# Patient Record
Sex: MALE | Race: WHITE | Employment: OTHER | URBAN - METROPOLITAN AREA
[De-identification: names, ages, dates, MRNs, and addresses within clinical notes are randomized per-mention and may not be internally consistent; named-entity substitution may affect disease eponyms.]

---

## 2017-05-10 ENCOUNTER — OFFICE VISIT (OUTPATIENT)
Dept: FAMILY MEDICINE | Facility: CLINIC | Age: 42
End: 2017-05-10
Payer: COMMERCIAL

## 2017-05-10 VITALS
HEIGHT: 72 IN | OXYGEN SATURATION: 97 % | HEART RATE: 72 BPM | RESPIRATION RATE: 16 BRPM | BODY MASS INDEX: 25.73 KG/M2 | SYSTOLIC BLOOD PRESSURE: 132 MMHG | DIASTOLIC BLOOD PRESSURE: 91 MMHG | TEMPERATURE: 97.8 F | WEIGHT: 190 LBS

## 2017-05-10 DIAGNOSIS — Z71.84 COUNSELING ABOUT TRAVEL: Primary | ICD-10-CM

## 2017-05-10 PROCEDURE — 99401 PREV MED CNSL INDIV APPRX 15: CPT | Performed by: FAMILY MEDICINE

## 2017-05-10 RX ORDER — CIPROFLOXACIN 500 MG/1
500 TABLET, FILM COATED ORAL 2 TIMES DAILY
Qty: 6 TABLET | Refills: 0 | Status: SHIPPED | OUTPATIENT
Start: 2017-05-10 | End: 2017-12-11

## 2017-05-10 NOTE — MR AVS SNAPSHOT
"              After Visit Summary   5/10/2017    Romario Lizarraga    MRN: [id number]           Patient Information     Date Of Birth          1975        Visit Information        Provider Department      5/10/2017 8:30 AM [address] MD [facility]        Today's Diagnoses     Counseling about travel    -  1       Follow-ups after your visit        Who to contact     If you have questions or need follow up information about today's clinic visit or your schedule please contact [facility] directly at [phone].  Normal or non-critical lab and imaging results will be communicated to you by MyChart, letter or phone within 4 business days after the clinic has received the results. If you do not hear from us within 7 days, please contact the clinic through [employer] or phone. If you have a critical or abnormal lab result, we will notify you by phone as soon as possible.  Submit refill requests through [employer] or call your pharmacy and they will forward the refill request to us. Please allow 3 business days for your refill to be completed.          Additional Information About Your Visit        MyChart Information     [employer] lets you send messages to your doctor, view your test results, renew your prescriptions, schedule appointments and more. To sign up, go to [email or web address] . Click on \"Log in\" on the left side of the screen, which will take you to the Welcome page. Then click on \"Sign up Now\" on the right side of the page.     You will be asked to enter the access code listed below, as well as some personal information. Please follow the directions to create your username and password.     Your access code is: [email or web address]  Expires: 2017  9:30 AM     Your access code will  in 90 days. If you need help or a new code, please call your [facility] or [phone].        Care EveryWhere ID     This is your Care EveryWhere ID. This could be used by other " organizations to access your [place] medical records  [id number]        Your Vitals Were     Pulse Temperature Respirations Height Pulse Oximetry BMI (Body Mass Index)    72 97.8  F (36.6  C) (Oral) 16 6' (1.829 m) 97% 25.77 kg/m2       Blood Pressure from Last 3 Encounters:   05/10/17 (!) 132/91   12/05/16 135/80   11/30/16 155/83    Weight from Last 3 Encounters:   05/10/17 190 lb (86.2 kg)   11/30/16 204 lb (92.5 kg)   11/15/16 197 lb (89.4 kg)              Today, you had the following     No orders found for display         Today's Medication Changes          These changes are accurate as of: 5/10/17  9:32 AM.  If you have any questions, ask your nurse or doctor.               Start taking these medicines.        Dose/Directions    ciprofloxacin 500 MG tablet   Commonly known as:  CIPRO   Used for:  Counseling about travel   Started by:  Lion [address] MD        Dose:  500 mg   Take 1 tablet (500 mg) by mouth 2 times daily   Quantity:  6 tablet   Refills:  0            Where to get your medicines      These medications were sent to [place] [address] [place] [facility] [address] [facility]  [address] [facility] [address]     Phone:  [phone]     ciprofloxacin 500 MG tablet                Primary Care Provider Office Phone # Fax #    Daniel Bauer [phone]       [address] [place] [address]        Thank you!     Thank you for choosing [facility]  for your care. Our goal is always to provide you with excellent care. Hearing back from our patients is one way we can continue to improve our services. Please take a few minutes to complete the written survey that you may receive in the mail after your visit with us. Thank you!             Your Updated Medication List - Protect others around you: Learn how to safely use, store and throw away your medicines at www.disposemymeds.org.          This list is accurate  as of: 5/10/17  9:32 AM.  Always use your most recent med list.                   Brand Name Dispense Instructions for use    albuterol 108 (90 BASE) MCG/ACT Inhaler    PROAIR HFA/PROVENTIL HFA/VENTOLIN HFA    3 Inhaler    Inhale 2 puffs into the lungs every 4 hours as needed for shortness of breath / dyspnea       ciprofloxacin 500 MG tablet    CIPRO    6 tablet    Take 1 tablet (500 mg) by mouth 2 times daily       fluticasone-salmeterol 250-50 MCG/DOSE diskus inhaler    ADVAIR DISKUS    3 Inhaler    Inhale 1 puff into the lungs every 12 hours

## 2017-05-10 NOTE — PROGRESS NOTES
Itinerary:  [facility]    Departure Date: 6/13/17    IMMUNIZATION HISTORY  Have you ever fainted from having your blood drawn or from an injection?  No  Have you ever had a fever reaction to vaccination?  No  Have you ever had any bad reaction or side effect from any vaccination?  No  Have you ever had hepatitis A or B vaccine?  Yes  Do you live (or work closely) with anyone who has AIDS, an AIDS-like condition, any other immune disorder or who is on chemotherapy for cancer?  No  Do you have a family history of immunodeficiency?  No  Have you received any injection of immune globulin or any blood products during the past 12 months?  No    GENERAL MEDICAL HISTORY  Do you have a medical condition that warrants maintenance medication or physician follow-up?  Yes  Do you have a medical condition that is stable now, but that may recur while traveling?  Yes  Have you had a fever in the past 48 hours?  No  Are you pregnant, or might you become pregnant on this trip?  No  Do you have AIDS, an AIDS-like condition, any other immune disorder, leukemia or cancer?  No  Have you had your thymus gland removed or a history of problems with your thymus, such as myasthenia gravis, DiGeorge syndrome, or thymoma?  No  Do you have severe thrombocytopenia (low platelet count) or a coagulation disorder?  No  Have you ever had a convulsion, seizure, epilepsy, neurologic condition or brain infection?  No  Do you have any stomach conditions?  No  Do you have a G6PD deficiency?  No  Do you have severe renal impairment?  No  Do you have bowel conditions such as diarrhea or constipation?  No  Have you ever had hepatitis or yellow jaundice?  No  Do you have a history of psychiatric problems?  No  Do you have a problem with strange dreams and/or nightmares?  No  Do you have insomnia?  No  Do you have problems with vaginitis?  No  Do you have psoriasis?  No  Have you or a member of your household ever been diagnosed with eczema or atopic  dermatitis (e.g. Itchy, red, scaly rash lasting >2 weeks that often comes and goes)?  No  Cardiac disease, with or without symptoms?  No  Do you have any eye conditions?  No  Are you prone to motion sickness?  No        Subjective:  Patient here for immunizations prior to traveling to [facility].  Patient denies any pain. symptoms of fever, cough or URI.  Objective:  Travel itinerary and immunization history completed.  Assessment:  International travel medical questionnaire completed.  Ok to give vaccines.  Plan:  Cipro 500 is given for travelers diarrhea per protocol.  Patient education reviewed and given to Romario.  Post Travel Period sheet discussed.  Romario advised to stay after injection per protocol.  the visit lasted for 15 minute, more than 50% was spent in counseling and coordination of care of the above issues.

## 2017-05-10 NOTE — NURSING NOTE
Chief Complaint   Patient presents with     Travel Clinic     [place]       Initial BP (!) 132/91 (BP Location: Right arm, Patient Position: Chair, Cuff Size: Adult Regular)  Pulse 72  Temp 97.8  F (36.6  C) (Oral)  Resp 16  Ht 6' (1.829 m)  Wt 190 lb (86.2 kg)  SpO2 97%  BMI 25.77 kg/m2 Estimated body mass index is 25.77 kg/(m^2) as calculated from the following:    Height as of this encounter: 6' (1.829 m).    Weight as of this encounter: 190 lb (86.2 kg).  Medication Reconciliation: complete   Kathrin Davalos, CMA

## 2017-11-22 DIAGNOSIS — J45.30 MILD PERSISTENT ASTHMA WITHOUT COMPLICATION: ICD-10-CM

## 2017-11-24 NOTE — TELEPHONE ENCOUNTER
Requested Prescriptions   Signed Prescriptions Disp Refills     fluticasone-salmeterol (ADVAIR) 250-50 MCG/DOSE diskus inhaler 1 Inhaler 0     Sig: Inhale 1 puff into the lungs every 12 hours    Inhaled Steroids Protocol Failed    11/22/2017  8:09 AM       Failed - Asthma control test 20 or greater in last 6 months    Please review ACT score.          Failed - Recent (6 mo) or future visit with authorizing provider's specialty    Patient had office visit in the last 6 months or has a visit in the next 30 days with authorizing provider.  See chart review.            Passed - Patient is age 12 or older        Refilled x 1. Due for 6 month asthma visit. Please call patient to help schedule this.   Chely Patrick, RN  Triage Nurse

## 2017-12-11 ENCOUNTER — OFFICE VISIT (OUTPATIENT)
Dept: PEDIATRICS | Facility: CLINIC | Age: 42
End: 2017-12-11
Payer: COMMERCIAL

## 2017-12-11 VITALS
WEIGHT: 207 LBS | BODY MASS INDEX: 28.07 KG/M2 | TEMPERATURE: 98 F | DIASTOLIC BLOOD PRESSURE: 80 MMHG | OXYGEN SATURATION: 99 % | SYSTOLIC BLOOD PRESSURE: 124 MMHG | HEART RATE: 87 BPM

## 2017-12-11 DIAGNOSIS — G89.29 CHRONIC GROIN PAIN, LEFT: ICD-10-CM

## 2017-12-11 DIAGNOSIS — M67.40 GANGLION CYST: ICD-10-CM

## 2017-12-11 DIAGNOSIS — N52.9 ERECTILE DYSFUNCTION, UNSPECIFIED ERECTILE DYSFUNCTION TYPE: ICD-10-CM

## 2017-12-11 DIAGNOSIS — G89.29 CHRONIC LOW BACK PAIN WITHOUT SCIATICA, UNSPECIFIED BACK PAIN LATERALITY: ICD-10-CM

## 2017-12-11 DIAGNOSIS — J45.30 MILD PERSISTENT ASTHMA WITHOUT COMPLICATION: Primary | ICD-10-CM

## 2017-12-11 DIAGNOSIS — M54.50 CHRONIC LOW BACK PAIN WITHOUT SCIATICA, UNSPECIFIED BACK PAIN LATERALITY: ICD-10-CM

## 2017-12-11 DIAGNOSIS — R10.32 CHRONIC GROIN PAIN, LEFT: ICD-10-CM

## 2017-12-11 PROCEDURE — 99214 OFFICE O/P EST MOD 30 MIN: CPT | Performed by: INTERNAL MEDICINE

## 2017-12-11 RX ORDER — SILDENAFIL 100 MG/1
100 TABLET, FILM COATED ORAL DAILY PRN
Qty: 12 TABLET | Refills: 3 | Status: SHIPPED | OUTPATIENT
Start: 2017-12-11 | End: 2018-12-10

## 2017-12-11 RX ORDER — ALBUTEROL SULFATE 90 UG/1
2 AEROSOL, METERED RESPIRATORY (INHALATION) EVERY 4 HOURS PRN
Qty: 3 INHALER | Refills: 3 | Status: SHIPPED | OUTPATIENT
Start: 2017-12-11 | End: 2018-12-10

## 2017-12-11 RX ORDER — GABAPENTIN 100 MG/1
CAPSULE ORAL
Qty: 90 CAPSULE | Refills: 1 | Status: SHIPPED | OUTPATIENT
Start: 2017-12-11 | End: 2018-03-18

## 2017-12-11 RX ORDER — SILDENAFIL 100 MG/1
100 TABLET, FILM COATED ORAL DAILY PRN
COMMUNITY
End: 2017-12-11

## 2017-12-11 NOTE — LETTER
My Asthma Action Plan  Name: Romario Lizarraga   YOB: 1975  Date: 12/11/2017   My doctor: Daniel Bauer MD, MD   My clinic: [facility]        My Control Medicine: Fluticasone + salmeterol (Advair) -  Diskus 100/50 mcg daily  My Rescue Medicine: Albuterol (Proair/Ventolin/Proventil) inhaler 108   My Asthma Severity: mild persistent  Avoid your asthma triggers: exercise or sports  exercise or sports            GREEN ZONE   Good Control    I feel good    No cough or wheeze    Can work, sleep and play without asthma symptoms       Take your asthma control medicine every day.     1. If exercise triggers your asthma, take your rescue medication    15 minutes before exercise or sports, and    During exercise if you have asthma symptoms  2. Spacer to use with inhaler: If you have a spacer, make sure to use it with your inhaler             YELLOW ZONE Getting Worse  I have ANY of these:    I do not feel good    Cough or wheeze    Chest feels tight    Wake up at night   1. Keep taking your Green Zone medications  2. Start taking your rescue medicine:    every 20 minutes for up to 1 hour. Then every 4 hours for 24-48 hours.  3. If you stay in the Yellow Zone for more than 12-24 hours, contact your doctor.  4. If you do not return to the Green Zone in 12-24 hours or you get worse, start taking your oral steroid medicine if prescribed by your provider.           RED ZONE Medical Alert - Get Help  I have ANY of these:    I feel awful    Medicine is not helping    Breathing getting harder    Trouble walking or talking    Nose opens wide to breathe       1. Take your rescue medicine NOW  2. If your provider has prescribed an oral steroid medicine, start taking it NOW  3. Call your doctor NOW  4. If you are still in the Red Zone after 20 minutes and you have not reached your doctor:    Take your rescue medicine again and    Call 911 or go to the emergency room right away    See your regular doctor within 2  weeks of an Emergency Room or Urgent Care visit for follow-up treatment.        Electronically signed by: Shari Powell, December 11, 2017    Annual Reminders:  Meet with Asthma Educator,  Flu Shot in the Fall, consider Pneumonia Vaccination for patients with asthma (aged 19 and older).    Pharm[address]: [facility] [address] [place] [address]                    Asthma Triggers  How To Control Things That Make Your Asthma Worse    Triggers are things that make your asthma worse.  Look at the list below to help you find your triggers and what you can do about them.  You can help prevent asthma flare-ups by staying away from your triggers.      Trigger                                                          What you can do   Cigarette Smoke  Tobacco smoke can make asthma worse. Do not allow smoking in your home, car or around you.  Be sure no one smokes at a child s day care or school.  If you smoke, ask your health care provider for ways to help you quit.  Ask family members to quit too.  Ask your health care provider for a referral to Quit Plan to help you quit smoking, or call 7-051-658-PLAN.     Colds, Flu, Bronchitis  These are common triggers of asthma. Wash your hands often.  Don t touch your eyes, nose or mouth.  Get a flu shot every year.     Dust Mites  These are tiny bugs that live in cloth or carpet. They are too small to see. Wash sheets and blankets in hot water every week.   Encase pillows and mattress in dust mite proof covers.  Avoid having carpet if you can. If you have carpet, vacuum weekly.   Use a dust mask and HEPA vacuum.   Pollen and Outdoor Mold  Some people are allergic to trees, grass, or weed pollen, or molds. Try to keep your windows closed.  Limit time out doors when pollen count is high.   Ask you health care provider about taking medicine during allergy season.     Animal Dander  Some people are allergic to skin flakes, urine or saliva from pets with fur or  feathers. Keep pets with fur or feathers out of your home.    If you can t keep the pet outdoors, then keep the pet out of your bedroom.  Keep the bedroom door closed.  Keep pets off cloth furniture and away from stuffed toys.     Mice, Rats, and Cockroaches  Some people are allergic to the waste from these pests.   Cover food and garbage.  Clean up spills and food crumbs.  Store grease in the refrigerator.   Keep food out of the bedroom.   Indoor Mold  This can be a trigger if your home has high moisture. Fix leaking faucets, pipes, or other sources of water.   Clean moldy surfaces.  Dehumidify basement if it is damp and smelly.   Smoke, Strong Odors, and Sprays  These can reduce air quality. Stay away from strong odors and sprays, such as perfume, powder, hair spray, paints, smoke incense, paint, cleaning products, candles and new carpet.   Exercise or Sports  Some people with asthma have this trigger. Be active!  Ask your doctor about taking medicine before sports or exercise to prevent symptoms.    Warm up for 5-10 minutes before and after sports or exercise.     Other Triggers of Asthma  Cold air:  Cover your nose and mouth with a scarf.  Sometimes laughing or crying can be a trigger.  Some medicines and food can trigger asthma.

## 2017-12-11 NOTE — PATIENT INSTRUCTIONS
INSTRUCTIONS FOR TODAY:     start gabapentin 100mg each evening for 1 week then increase to 200mg each evening       If no improvement in the next few weeks, we can continue to increase gabapetnin  Schedule visit with physical therapy     Dr Bauer

## 2017-12-11 NOTE — MR AVS SNAPSHOT
After Visit Summary   12/11/2017    Romario Lizarraga    MRN: [id number]           Patient Information     Date Of Birth          1975        Visit Information        Provider Department      12/11/2017 11:00 AM Daniel Bauer MD [facility]        Today's Diagnoses     Erectile dysfunction, unspecified erectile dysfunction type    -  1    Mild persistent asthma without complication        Chronic groin pain, left        Chronic low back pain without sciatica, unspecified back pain laterality          Care Instructions    INSTRUCTIONS FOR TODAY:     start gabapentin 100mg each evening for 1 week then increase to 200mg each evening       If no improvement in the next few weeks, we can continue to increase gabapetnin  Schedule visit with physical therapy     Dr Bauer            Follow-ups after your visit        Additional Services     [facility] PT, HAND, AND CHIROPRACTIC REFERRAL       **This order will print in the [facility] Scheduling Office**    Physical Therapy, Hand Therapy and Chiropractic Care are available through:    *[facility]  *[facility]  *[facility]    Call one number to schedule at any of the above locations: [phone].    Your provider has referred you to: [facility]    Indication/Reason for Referral: chronic low back pain  Onset of Illness:   Therapy Orders: Evaluate and Treat  Special Programs: None  Special Request: None    Isiah Joyce      Additional Comments for the Therapist or Chiropractor:     Please be aware that coverage of these services is subject to the terms and limitations of your health insurance plan.  Call member services at your health plan with any benefit or coverage questions.      Please bring the following to your appointment:    *Your personal calendar for scheduling future appointments  *Comfortable clothing                  Who to contact     If you have questions or need follow up information  "about today's clinic visit or your schedule please contact [facility] directly at [phone].  Normal or non-critical lab and imaging results will be communicated to you by MyChart, letter or phone within 4 business days after the clinic has received the results. If you do not hear from us within 7 days, please contact the clinic through [employer] or phone. If you have a critical or abnormal lab result, we will notify you by phone as soon as possible.  Submit refill requests through [employer] or call your pharmacy and they will forward the refill request to us. Please allow 3 business days for your refill to be completed.          Additional Information About Your Visit        [employer] Information     [employer] lets you send messages to your doctor, view your test results, renew your prescriptions, schedule appointments and more. To sign up, go to [email or web address] . Click on \"Log in\" on the left side of the screen, which will take you to the Welcome page. Then click on \"Sign up Now\" on the right side of the page.     You will be asked to enter the access code listed below, as well as some personal information. Please follow the directions to create your username and password.     Your access code is: [email or web address]  Expires: 3/11/2018 11:34 AM     Your access code will  in 90 days. If you need help or a new code, please call your [place] clinic or [phone].        Care EveryWhere ID     This is your Care EveryWhere ID. This could be used by other organizations to access your [place] medical records  [id number]        Your Vitals Were     Pulse Temperature Pulse Oximetry BMI (Body Mass Index)          87 98  F (36.7  C) (Oral) 99% 28.07 kg/m2         Blood Pressure from Last 3 Encounters:   17 124/80   05/10/17 (!) 132/91   16 135/80    Weight from Last 3 Encounters:   17 207 lb (93.9 kg)   05/10/17 190 lb (86.2 kg)   16 204 lb (92.5 kg)              We Performed the " Following     Asthma Action Plan (AAP)     [facility] PT, HAND, AND CHIROPRACTIC REFERRAL          Today's Medication Changes          These changes are accurate as of: 12/11/17 11:34 AM.  If you have any questions, ask your nurse or doctor.               Start taking these medicines.        Dose/Directions    gabapentin 100 MG capsule   Commonly known as:  NEURONTIN   Used for:  Chronic groin pain, left   Started by:  Daniel Bauer MD        1 capsule QHS for 1 week then increase to 2 capsules QHS   Quantity:  90 capsule   Refills:  1         These medicines have changed or have updated prescriptions.        Dose/Directions    fluticasone-salmeterol 250-50 MCG/DOSE diskus inhaler   Commonly known as:  ADVAIR   This may have changed:  See the new instructions.   Used for:  Mild persistent asthma without complication   Changed by:  Daniel Bauer MD        Dose:  1 puff   Inhale 1 puff into the lungs 2 times daily   Quantity:  3 Inhaler   Refills:  3         Stop taking these medicines if you haven't already. Please contact your care team if you have questions.     ciprofloxacin 500 MG tablet   Commonly known as:  CIPRO   Stopped by:  Daniel Bauer MD                Where to get your medicines      These medications were sent to [place] [address] [place] [facility] [address] [facility]  [address] [facility] [address]     Phone:  [phone]     albuterol 108 (90 BASE) MCG/ACT Inhaler    fluticasone-salmeterol 250-50 MCG/DOSE diskus inhaler    gabapentin 100 MG capsule    sildenafil 100 MG tablet                Primary Care Provider Office Phone # Fax #    Daniel Bauer [phone]       [facility] [place] [address]        Equal Access to Services     [facility]: Hadii sharita blank hadasho Soomaali, waaxda luqadaha, qaybta kaalmagerard dueñas, glen giron. [facility] [phone].    ATENCIÓN: delbert Longoria  lennon disposición servicios gratuitos de asistencia lingüística. Mike keane [phone].    We comply with applicable federal civil rights laws and [place] laws. We do not discriminate on the basis of race, color, national origin, age, disability, sex, sexual orientation, or gender identity.            Thank you!     Thank you for choosing [facility]  for your care. Our goal is always to provide you with excellent care. Hearing back from our patients is one way we can continue to improve our services. Please take a few minutes to complete the written survey that you may receive in the mail after your visit with us. Thank you!             Your Updated Medication List - Protect others around you: Learn how to safely use, store and throw away your medicines at www.disposemymeds.org.          This list is accurate as of: 12/11/17 11:34 AM.  Always use your most recent med list.                   Brand Name Dispense Instructions for use Diagnosis    albuterol 108 (90 BASE) MCG/ACT Inhaler    PROAIR HFA/PROVENTIL HFA/VENTOLIN HFA    3 Inhaler    Inhale 2 puffs into the lungs every 4 hours as needed for shortness of breath / dyspnea    Mild persistent asthma without complication       fluticasone-salmeterol 250-50 MCG/DOSE diskus inhaler    ADVAIR    3 Inhaler    Inhale 1 puff into the lungs 2 times daily    Mild persistent asthma without complication       gabapentin 100 MG capsule    NEURONTIN    90 capsule    1 capsule QHS for 1 week then increase to 2 capsules QHS    Chronic groin pain, left       sildenafil 100 MG tablet    VIAGRA    12 tablet    Take 1 tablet (100 mg) by mouth daily as needed    Erectile dysfunction, unspecified erectile dysfunction type

## 2017-12-11 NOTE — NURSING NOTE
Chief Complaint   Patient presents with     Recheck Medication       Initial /80 (Cuff Size: Adult Large)  Pulse 87  Temp 98  F (36.7  C) (Oral)  Wt 207 lb (93.9 kg)  SpO2 99%  BMI 28.07 kg/m2 Estimated body mass index is 28.07 kg/(m^2) as calculated from the following:    Height as of 5/10/17: 6' (1.829 m).    Weight as of this encounter: 207 lb (93.9 kg).  Medication Reconciliation: genevieve Schaffer, CMA
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
## 2017-12-12 ASSESSMENT — ASTHMA QUESTIONNAIRES: ACT_TOTALSCORE: 25

## 2017-12-18 ENCOUNTER — THERAPY VISIT (OUTPATIENT)
Dept: PHYSICAL THERAPY | Facility: CLINIC | Age: 42
End: 2017-12-18
Payer: COMMERCIAL

## 2017-12-18 DIAGNOSIS — M54.50 LUMBAGO: Primary | ICD-10-CM

## 2017-12-18 PROCEDURE — 97110 THERAPEUTIC EXERCISES: CPT | Mod: GP | Performed by: PHYSICAL THERAPIST

## 2017-12-18 PROCEDURE — 97161 PT EVAL LOW COMPLEX 20 MIN: CPT | Mod: GP | Performed by: PHYSICAL THERAPIST

## 2017-12-18 NOTE — PROGRESS NOTES
[facility] Evaluation  Subjective:    Patient is a 42 year old male presenting with rehab back hpi. The history is provided by the patient. No  was used.   Romario Lizarraga is a 42 year old male with a lumbar condition.  Condition occurred with:  Bending.  Condition occurred: at work.  This is a chronic condition  Patient is reporting chronic low back pain since working in the yard, pulling weeds in 2015. Had a short course of PT which helped but they took 30 minutes, which was too much time and he didn't do them. Then 1 year later he was ice skating with his kids, picking them up and the next morning experienced an increase in back pain and radicular pain down the L LE to the toes. This pain decreased because he avoided lumbar flexion for 1 month. Currently, experiencing pain in the low back with numbness in the L foot and calf. Feels limited sitting in certain chairs for too long (30 minutes in trunk) and standing too long (30 minutes).    Would like to return to soccer and playing with kids..    Patient reports pain:  Lower lumbar spine.  Radiates to:  No radiation.  Pain is described as aching and is constant and reported as 5/10.  Associated symptoms:  Numbness, loss of motion/stiffness and loss of strength. Pain is worse in the P.M..  Symptoms are exacerbated by sitting, standing and lifting and relieved by NSAID's.  Since onset symptoms are unchanged.    Previous treatment includes physical therapy.  There was mild (poor follow through) improvement following previous treatment.  General health as reported by patient is good.  Pertinent medical history includes:  Overweight, high blood pressure, asthma and smoking.  Medical allergies: no.  Other surgeries include:  Other (eye, nose, finger, appendix).  Current medications:  Anti-inflammatory and pain medication.  Current occupation is .  Patient is working in normal job without restrictions.  Primary  job tasks include:  Prolonged sitting and other (computer).                                Objective:    System         Lumbar/SI Evaluation  ROM:  Arom wnl lumbar: ERIC: no change during, improved ROM; RFIS: no change; REIL: decreased L foot sensation, RFIL: improved L foot sensation.  AROM Lumbar:   Flexion:        Mid shins  Ext:                    25%   Side Bend:        Left:     Right:   Rotation:           Left:     Right:   Side Glide:        Left:     Right:         Strength: TrA Contraction: fair-; Glute Max B: +3/5  Lumbar Myotomes:    T12-L3 (Hip Flex):  Left: 3+    Right: 5  L2-4 (Quads):  Left:  5    Right:  5  L4 (Ankle DF):  Left:  5    Right:  5  L5 (Great Toe Ext): Left: 5    Right: 5   S1 (Toe Raise):  Left: 5    Right: 5      Lumbar Dermtomes:                S1 Left:  Hyper-light touch       Neural Tension/Mobility:    Left side:  SLR w/DF positive.     Right side:   SLR w/DF  negative.   Lumbar Palpation:  normal                                                         General     ROS    Assessment/Plan:      Patient is a 42 year old male with lumbar complaints.    Patient has the following significant findings with corresponding treatment plan.                Diagnosis 1:  Low back pain  Pain -  hot/cold therapy, manual therapy, education, directional preference exercise and home program  Decreased ROM/flexibility - manual therapy and therapeutic exercise  Decreased strength - therapeutic exercise and therapeutic activities  Impaired muscle performance - neuro re-education  Decreased function - therapeutic activities  Impaired posture - neuro re-education    Therapy Evaluation Codes:   1) History comprised of:   Personal factors that impact the plan of care:      Time since onset of symptoms.    Comorbidity factors that impact the plan of care are:      Asthma, High blood pressure, Numbness/tingling, Overweight, Pain at night/rest, Smoking and Weakness.     Medications impacting care:  Anti-inflammatory and Pain.  2) Examination of Body Systems comprised of:   Body structures and functions that impact the plan of care:      Lumbar spine.   Activity limitations that impact the plan of care are:      Lifting, Sitting and Standing.  3) Clinical presentation characteristics are:   Stable/Uncomplicated.  4) Decision-Making    Low complexity using standardized patient assessment instrument and/or measureable assessment of functional outcome.  Cumulative Therapy Evaluation is: Low complexity.    Previous and current functional limitations:  (See Goal Flow Sheet for this information)    Short term and Long term goals: (See Goal Flow Sheet for this information)     Communication ability:  Patient appears to be able to clearly communicate and understand verbal and written communication and follow directions correctly.  Treatment Explanation - The following has been discussed with the patient:   RX ordered/plan of care  Anticipated outcomes  Possible risks and side effects  This patient would benefit from PT intervention to resume normal activities.   Rehab potential is fair.    Frequency:  1 X week, once daily  Duration:  for 4 weeks tapering to 2 X a month over 4 weeks  Discharge Plan:  Achieve all LTG.  Independent in home treatment program.  Reach maximal therapeutic benefit.    Please refer to the daily flowsheet for treatment today, total treatment time and time spent performing 1:1 timed codes.

## 2018-01-02 ENCOUNTER — THERAPY VISIT (OUTPATIENT)
Dept: PHYSICAL THERAPY | Facility: CLINIC | Age: 43
End: 2018-01-02
Payer: COMMERCIAL

## 2018-01-02 DIAGNOSIS — M54.50 LUMBAGO: Primary | ICD-10-CM

## 2018-01-02 PROCEDURE — 97112 NEUROMUSCULAR REEDUCATION: CPT | Mod: GP | Performed by: PHYSICAL THERAPIST

## 2018-01-02 PROCEDURE — 97110 THERAPEUTIC EXERCISES: CPT | Mod: GP | Performed by: PHYSICAL THERAPIST

## 2018-01-10 ENCOUNTER — THERAPY VISIT (OUTPATIENT)
Dept: PHYSICAL THERAPY | Facility: CLINIC | Age: 43
End: 2018-01-10
Payer: COMMERCIAL

## 2018-01-10 DIAGNOSIS — M54.50 LUMBAGO: ICD-10-CM

## 2018-01-10 PROCEDURE — 97112 NEUROMUSCULAR REEDUCATION: CPT | Mod: GP | Performed by: PHYSICAL THERAPIST

## 2018-01-10 PROCEDURE — 97110 THERAPEUTIC EXERCISES: CPT | Mod: GP | Performed by: PHYSICAL THERAPIST

## 2018-01-24 ENCOUNTER — THERAPY VISIT (OUTPATIENT)
Dept: PHYSICAL THERAPY | Facility: CLINIC | Age: 43
End: 2018-01-24
Payer: COMMERCIAL

## 2018-01-24 DIAGNOSIS — M54.50 LUMBAGO: ICD-10-CM

## 2018-01-24 PROCEDURE — 97110 THERAPEUTIC EXERCISES: CPT | Mod: GP | Performed by: PHYSICAL THERAPIST

## 2018-01-24 PROCEDURE — 97112 NEUROMUSCULAR REEDUCATION: CPT | Mod: GP | Performed by: PHYSICAL THERAPIST

## 2018-02-19 ENCOUNTER — OFFICE VISIT (OUTPATIENT)
Dept: FAMILY MEDICINE | Facility: CLINIC | Age: 43
End: 2018-02-19
Payer: COMMERCIAL

## 2018-02-19 VITALS — DIASTOLIC BLOOD PRESSURE: 83 MMHG | TEMPERATURE: 98.8 F | SYSTOLIC BLOOD PRESSURE: 143 MMHG

## 2018-02-19 DIAGNOSIS — Z71.84 TRAVEL ADVICE ENCOUNTER: Primary | ICD-10-CM

## 2018-02-19 PROCEDURE — 99401 PREV MED CNSL INDIV APPRX 15: CPT | Mod: GA | Performed by: NURSE PRACTITIONER

## 2018-02-19 RX ORDER — AZITHROMYCIN 500 MG/1
500 TABLET, FILM COATED ORAL DAILY
Qty: 3 TABLET | Refills: 0 | Status: SHIPPED | OUTPATIENT
Start: 2018-02-19 | End: 2018-02-22

## 2018-02-19 RX ORDER — ONDANSETRON 4 MG/1
4-8 TABLET, ORALLY DISINTEGRATING ORAL EVERY 8 HOURS PRN
Qty: 10 TABLET | Refills: 0 | Status: SHIPPED | OUTPATIENT
Start: 2018-02-19 | End: 2020-04-28

## 2018-02-19 NOTE — MR AVS SNAPSHOT
After Visit Summary   2/19/2018    Romario Lizarraga    MRN: [id number]           Patient Information     Date Of Birth          1975        Visit Information        Provider Department      2/19/2018 9:00 AM Claudia Fleming APRN CNP [facility]        Today's Diagnoses     Travel advice encounter    -  1      Care Instructions    Today February 19, 2018 you received the    NONE today.    These appointments can be made as a NURSE ONLY visit.    **It is very important for the vaccinations to be given on the scheduled day(s), this helps ensure you receive the full effectiveness of the vaccine.**    Please call [facility] with any questions [phone]    Thank you for visiting [facility]              Follow-ups after your visit        Your next 10 appointments already scheduled     Mar 05, 2018 11:20 AM CST   [facility] with Yajaira Decker PT   [facility] [place] ([facility] [place]  )    [address] [id number] [phone]              Who to contact     If you have questions or need follow up information about today's clinic visit or your schedule please contact [facility] directly at [phone].  Normal or non-critical lab and imaging results will be communicated to you by MyChart, letter or phone within 4 business days after the clinic has received the results. If you do not hear from us within 7 days, please contact the clinic through MyChart or phone. If you have a critical or abnormal lab result, we will notify you by phone as soon as possible.  Submit refill requests through [employer] or call your pharmacy and they will forward the refill request to us. Please allow 3 business days for your refill to be completed.          Additional Information About Your Visit        MyChart Information     [employer] lets you send messages to your doctor, view your test results, renew your  "prescriptions, schedule appointments and more. To sign up, go to [email or web address] . Click on \"Log in\" on the left side of the screen, which will take you to the Welcome page. Then click on \"Sign up Now\" on the right side of the page.     You will be asked to enter the access code listed below, as well as some personal information. Please follow the directions to create your username and password.     Your access code is: [email or web address]  Expires: 3/11/2018 11:34 AM     Your access code will  in 90 days. If you need help or a new code, please call your [facility] clinic or [phone].        Care EveryWhere ID     This is your Care EveryWhere ID. This could be used by other organizations to access your [facility] medical records  [id number]        Your Vitals Were     Temperature                   98.8  F (37.1  C) (Oral)            Blood Pressure from Last 3 Encounters:   18 143/83   17 124/80   05/10/17 (!) 132/91    Weight from Last 3 Encounters:   17 207 lb (93.9 kg)   05/10/17 190 lb (86.2 kg)   16 204 lb (92.5 kg)              Today, you had the following     No orders found for display         Today's Medication Changes          These changes are accurate as of 18 10:13 AM.  If you have any questions, ask your nurse or doctor.               Start taking these medicines.        Dose/Directions    azithromycin 500 MG tablet   Commonly known as:  ZITHROMAX   Used for:  Travel advice encounter   Started by:  Claudia Fleming APRN CNP        Dose:  500 mg   Take 1 tablet (500 mg) by mouth daily for 3 doses Take 1 tablet a day for up to 3 days for severe diarrhea   Quantity:  3 tablet   Refills:  0       ondansetron 4 MG ODT tab   Commonly known as:  ZOFRAN ODT   Used for:  Travel advice encounter   Started by:  Claudia Fleming APRN CNP        Dose:  4-8 mg   Take 1-2 tablets (4-8 mg) by mouth every 8 hours as needed for nausea   Quantity:  10 tablet   Refills:  0          "   Where to get your medicines      These medications were sent to [facility] [id number] [facility] - [place], [place] - [address]  [address], [facility] [place] [address]     Phone:  [phone]     azithromycin 500 MG tablet    ondansetron 4 MG ODT tab                Primary Care Provider Office Phone # Fax #    Daniel Bauer [phone]       [facility] [place] [address]        Equal Access to Services     [facility]: Hadii aad ku hadasho Soomaali, waaxda luqadaha, qaybta kaalmada adeegyada, waxay idiin hayaan adeeg kharash la'aan . So [facility] [phone].    ATENCIÓN: Si habla español, tiene a lennon disposición servicios gratuitos de asistencia lingüística. [facility] [phone].    We comply with applicable federal civil rights laws and [place] laws. We do not discriminate on the basis of race, color, national origin, age, disability, sex, sexual orientation, or gender identity.            Thank you!     Thank you for choosing [facility]  for your care. Our goal is always to provide you with excellent care. Hearing back from our patients is one way we can continue to improve our services. Please take a few minutes to complete the written survey that you may receive in the mail after your visit with us. Thank you!             Your Updated Medication List - Protect others around you: Learn how to safely use, store and throw away your medicines at www.disposemymeds.org.          This list is accurate as of 2/19/18 10:13 AM.  Always use your most recent med list.                   Brand Name Dispense Instructions for use Diagnosis    albuterol 108 (90 BASE) MCG/ACT Inhaler    PROAIR HFA/PROVENTIL HFA/VENTOLIN HFA    3 Inhaler    Inhale 2 puffs into the lungs every 4 hours as needed for shortness of breath / dyspnea    Mild persistent asthma without complication       azithromycin 500 MG tablet    ZITHROMAX    3 tablet    Take 1 tablet (500 mg) by mouth  daily for 3 doses Take 1 tablet a day for up to 3 days for severe diarrhea    Travel advice encounter       fluticasone-salmeterol 250-50 MCG/DOSE diskus inhaler    ADVAIR    3 Inhaler    Inhale 1 puff into the lungs 2 times daily    Mild persistent asthma without complication       gabapentin 100 MG capsule    NEURONTIN    90 capsule    1 capsule QHS for 1 week then increase to 2 capsules QHS    Chronic groin pain, left       ondansetron 4 MG ODT tab    ZOFRAN ODT    10 tablet    Take 1-2 tablets (4-8 mg) by mouth every 8 hours as needed for nausea    Travel advice encounter       sildenafil 100 MG tablet    VIAGRA    12 tablet    Take 1 tablet (100 mg) by mouth daily as needed    Erectile dysfunction, unspecified erectile dysfunction type

## 2018-02-19 NOTE — NURSING NOTE
Chief Complaint   Patient presents with     Travel Clinic     initial /83  Temp 98.8  F (37.1  C) (Oral) Estimated body mass index is 28.07 kg/(m^2) as calculated from the following:    Height as of 5/10/17: 6' (1.829 m).    Weight as of 12/11/17: 207 lb (93.9 kg).  BP completed using cuff size: regular.  L  arm      Health Maintenance that is potentially due pending provider review:  NONE    n/a    José Luis Pemberton ma

## 2018-02-19 NOTE — PROGRESS NOTES
Nurse Note      Itinerary:  [place] [facility]      Departure Date: 3/20/18      Return Date: 3/31/18      Length of Trip 10 days      Reason for Travel: Tourism           Urban or rural: both      Accommodations: Hotel        IMMUNIZATION HISTORY  Have you received any immunizations within the past 4 weeks?  No  Have you ever fainted from having your blood drawn or from an injection?  No  Have you ever had a fever reaction to vaccination?  No  Have you ever had any bad reaction or side effect from any vaccination?  No  Have you ever had hepatitis A or B vaccine?  yes  Do you live (or work closely) with anyone who has AIDS, an AIDS-like condition, any other immune disorder or who is on chemotherapy for cancer?  No  Do you have a family history of immunodeficiency?  No  Have you received any injection of immune globulin or any blood products during the past 12 months?  No    Patient roomed by José Luis Copeland  Romario Lizarraga is a 42 year old male seen today with family member (spouse and 2 children) for counsultation for international travel to [place] for Tourism.  Patient will be departing in  1 month(s) and staying for   11 day(s) and  traveling with family member(s).      Patient itinerary :  will be in the urban primarily with minimal rural region of [facility] which presents risk for Dengue Fever, Chikungungya, Zika, Schistosomiasis, Japanese Encephalitis, Rabies, food borne illnesses, motor vehicle accidents and Typhoid. exposure.      Patient's activities will include sightseeing.    Patient's country of birth is [place]    Special medical concerns: asthma  Pre-travel questionnaire was completed by patient and reviewed by provider.     Vitals: /83  Temp 98.8  F (37.1  C) (Oral)  BMI= There is no height or weight on file to calculate BMI.    EXAM:  General:  Well-nourished, well-developed in no acute distress.  Appears to be stated age, interacts appropriately and expresses understanding of  information given to patient.    Current Outpatient Prescriptions   Medication Sig Dispense Refill     fluticasone-salmeterol (ADVAIR) 250-50 MCG/DOSE diskus inhaler Inhale 1 puff into the lungs 2 times daily 3 Inhaler 3     albuterol (PROAIR HFA/PROVENTIL HFA/VENTOLIN HFA) 108 (90 BASE) MCG/ACT Inhaler Inhale 2 puffs into the lungs every 4 hours as needed for shortness of breath / dyspnea 3 Inhaler 3     sildenafil (VIAGRA) 100 MG tablet Take 1 tablet (100 mg) by mouth daily as needed 12 tablet 3     gabapentin (NEURONTIN) 100 MG capsule 1 capsule QHS for 1 week then increase to 2 capsules QHS 90 capsule 1     Patient Active Problem List   Diagnosis     CARDIOVASCULAR SCREENING; LDL GOAL LESS THAN 160     Seborrheic dermatitis     Lumbago     Mild persistent asthma without complication     No Known Allergies      Immunizations discussed include:   Hepatitis A:  Up to date  Hepatitis B: Up to date  Influenza: Up to date  Typhoid: Up to date  Rabies: Up to date  Yellow Fever: Up to date  Japanese Encephalitis: Not indicated - risk of disease is minimal off season and short trip  Meningococcus: Not indicated  Tetanus/Diphtheria: Up to date  Measles/Mumps/Rubella: Up to date  Cholera: Not needed  Polio: Up to date  Pneumococcal: Up to date  Varicella: Immune by disease history per patient report  Zostavax:  Not indicated  HPV:  Not indicated  TB:  Low risk     Altitude Exposure on this trip: no  Past tolerance to Altitude: na    ASSESSMENT/PLAN:    ICD-10-CM    1. Travel advice encounter Z71.89 azithromycin (ZITHROMAX) 500 MG tablet     ondansetron (ZOFRAN ODT) 4 MG ODT tab     I have reviewed general recommendations for safe travel   including: food/water precautions, insect precautions, safer sex   practices given high prevalence of Zika,   roadway safety. Educational materials and [employer] report provided.    Malaraia prophylaxis recommended: none  Symptomatic treatment for traveler's diarrhea: azithromycin  Altitude  illness prevention and treatment: none      Evacuation insurance advised and resources were provided to patient.    Total visit time 20 minutes  with over 50% of time spent counseling patient as detailed above.    Claudia Fleming CNP

## 2018-02-19 NOTE — PATIENT INSTRUCTIONS
Today February 19, 2018 you received the    NONE today.    These appointments can be made as a NURSE ONLY visit.    **It is very important for the vaccinations to be given on the scheduled day(s), this helps ensure you receive the full effectiveness of the vaccine.**    Please call [facility] with any questions [phone]    Thank you for visiting [facility]

## 2018-03-05 ENCOUNTER — THERAPY VISIT (OUTPATIENT)
Dept: PHYSICAL THERAPY | Facility: CLINIC | Age: 43
End: 2018-03-05
Payer: COMMERCIAL

## 2018-03-05 DIAGNOSIS — M54.50 LUMBAGO: ICD-10-CM

## 2018-03-05 PROCEDURE — 97110 THERAPEUTIC EXERCISES: CPT | Mod: GP | Performed by: PHYSICAL THERAPIST

## 2018-03-05 PROCEDURE — 97112 NEUROMUSCULAR REEDUCATION: CPT | Mod: GP | Performed by: PHYSICAL THERAPIST

## 2018-03-05 NOTE — PROGRESS NOTES
"Subjective:  HPI                    Objective:  System    Physical Exam    General     ROS    Assessment/Plan:    PROGRESS  REPORT    Progress reporting period is from 12/18/2017 to 3/5/2018.       SUBJECTIVE  Subjective changes noted by patient:  Reports his back had been fine up until his ice fishing trip in Feb. Then he got sick, and stopped doing the exercises, was unable to do the flexion stretch d/t be nauseous. Then reports \"every excuse in the book\" as to why he didn't return to the exercises. 1 week ago, he was standing for a couple hours and he states this increased his back pain. At [place], he bent over to pick something up and felt a \"hard twinge\" of pain. Has also noticed the pain in L calf has returned.    Current pain level is 4/10.     Previous pain level was  5/10.   Changes in function:  Yes (See Goal flowsheet attached for changes in current functional level)  Adverse reaction to treatment or activity: None    OBJECTIVE  Changes noted in objective findings:  Yes,   Objective:   AROM Lumbar Flx: distal shins, Ext: 50%;   TrA Contraction: good; Strength Hip Abd R: 4/5, L: +4/5;   Core Endurance Prone Plank: 60 sec     ASSESSMENT/PLAN  Updated problem list and treatment plan: Diagnosis 1:  Low back pain  Pain -  hot/cold therapy, manual therapy, education, directional preference exercise and home program  Decreased ROM/flexibility - manual therapy and therapeutic exercise  Decreased strength - therapeutic exercise and therapeutic activities  Impaired muscle performance - neuro re-education  Decreased function - therapeutic activities  STG/LTGs have been met or progress has been made towards goals:  Yes (See Goal flow sheet completed today.)  Assessment of Progress: The patient's condition is improving.  The patient's condition has potential to improve.  The patient has had set backs in their progress since stopping his home exercise program.  Patient is meeting short term goals and is progressing " towards long term goals.  Self Management Plans:  Patient has been instructed in a home treatment program.  I have re-evaluated this patient and find that the nature, scope, duration and intensity of the therapy is appropriate for the medical condition of the patient.  Romario continues to require the following intervention to meet STG and LTG's:  PT    Recommendations:  This patient would benefit from continued therapy.     Frequency:  2 X a month, once daily  Duration:  for 6 weeks        Please refer to the daily flowsheet for treatment today, total treatment time and time spent performing 1:1 timed codes.

## 2018-03-18 DIAGNOSIS — G89.29 CHRONIC GROIN PAIN, LEFT: ICD-10-CM

## 2018-03-18 DIAGNOSIS — R10.32 CHRONIC GROIN PAIN, LEFT: ICD-10-CM

## 2018-03-18 NOTE — TELEPHONE ENCOUNTER
gabapentin (NEURONTIN) 100 MG capsule      Last Written Prescription Date:  12/11/2017  Last Fill Quantity: 90 capsule,   # refills: 1  Last Office Visit: 12/11/2017 nathaniel Dempsey MD  Future Office visit:       Routing refill request to provider for review/approval because:  Drug not on the [facility], [facility] or [facility] refill protocol or controlled substance

## 2018-03-19 ENCOUNTER — THERAPY VISIT (OUTPATIENT)
Dept: PHYSICAL THERAPY | Facility: CLINIC | Age: 43
End: 2018-03-19
Payer: COMMERCIAL

## 2018-03-19 DIAGNOSIS — M54.50 LUMBAGO: ICD-10-CM

## 2018-03-19 PROCEDURE — 97110 THERAPEUTIC EXERCISES: CPT | Mod: GP | Performed by: PHYSICAL THERAPIST

## 2018-03-19 PROCEDURE — 97112 NEUROMUSCULAR REEDUCATION: CPT | Mod: GP | Performed by: PHYSICAL THERAPIST

## 2018-03-19 RX ORDER — GABAPENTIN 100 MG/1
200 CAPSULE ORAL AT BEDTIME
Qty: 180 CAPSULE | Refills: 1 | Status: SHIPPED | OUTPATIENT
Start: 2018-03-19 | End: 2018-09-25

## 2018-03-19 NOTE — TELEPHONE ENCOUNTER
Unable to fill per standing order routed to Dr. Bauer for approval.  **[place]  reviewed- last filled: 2/13 #59, 1/13 #60, 12/22 #61    I confirmed with patient that he is taking Gabapentin 200 mg qhs. Updated sig & dispense amount to reflect

## 2018-04-20 ENCOUNTER — THERAPY VISIT (OUTPATIENT)
Dept: PHYSICAL THERAPY | Facility: CLINIC | Age: 43
End: 2018-04-20
Payer: COMMERCIAL

## 2018-04-20 DIAGNOSIS — M54.50 LUMBAGO: ICD-10-CM

## 2018-04-20 PROCEDURE — 97112 NEUROMUSCULAR REEDUCATION: CPT | Mod: GP | Performed by: PHYSICAL THERAPIST

## 2018-04-20 PROCEDURE — 97110 THERAPEUTIC EXERCISES: CPT | Mod: GP | Performed by: PHYSICAL THERAPIST

## 2018-06-04 ENCOUNTER — THERAPY VISIT (OUTPATIENT)
Dept: PHYSICAL THERAPY | Facility: CLINIC | Age: 43
End: 2018-06-04
Payer: COMMERCIAL

## 2018-06-04 DIAGNOSIS — M54.50 LUMBAGO: ICD-10-CM

## 2018-06-04 PROCEDURE — 97112 NEUROMUSCULAR REEDUCATION: CPT | Mod: GP | Performed by: PHYSICAL THERAPIST

## 2018-06-04 PROCEDURE — 97110 THERAPEUTIC EXERCISES: CPT | Mod: GP | Performed by: PHYSICAL THERAPIST

## 2018-06-04 NOTE — PROGRESS NOTES
Subjective:  HPI  Oswestry Score: 22 %                 Objective:  System    Physical Exam    General     ROS    Assessment/Plan:    DISCHARGE REPORT    Progress reporting period is from 3/5/2018 to 6/4/2018.       SUBJECTIVE  Subjective changes noted by patient:  Back is doing well, does continue ache but is able to function. Does have days in which it flairs up but this decreases; however, admits to not performing the exercises in the past month. Does do the stretches when he has a flair up.     Current pain level is 1/10.     Previous pain level was  5/10.   Changes in function:  Yes (See Goal flowsheet attached for changes in current functional level)  Adverse reaction to treatment or activity: None    OBJECTIVE  Changes noted in objective findings:  Yes,   Objective:   AROM Lumbar Flx: toes, Ext: 100%;   Strength Hip Abd B: +4/5, Hip Ext B: +4/5;   TrA Contraction: good;   Core Endurance Prone Plank: 60 sec     ASSESSMENT/PLAN  Updated problem list and treatment plan: Diagnosis 1:  Low back pain  Pain -  home program  Decreased strength - home program  Impaired muscle performance - home program  Decreased function - home program  STG/LTGs have been met or progress has been made towards goals:  Yes (See Goal flow sheet completed today.)  Assessment of Progress: The patient's progress has plateaued.  Self Management Plans:  Patient is independent in a home treatment program.  I have re-evaluated this patient and find that the nature, scope, duration and intensity of the therapy is appropriate for the medical condition of the patient.  Romario continues to require the following intervention to meet STG and LTG's:  PT intervention is no longer required to meet STG/LTG.    Recommendations:  This patient is ready to be discharged from therapy and continue their home treatment program.    Please refer to the daily flowsheet for treatment today, total treatment time and time spent performing 1:1 timed codes.

## 2018-09-25 DIAGNOSIS — G89.29 CHRONIC GROIN PAIN, LEFT: ICD-10-CM

## 2018-09-25 DIAGNOSIS — R10.32 CHRONIC GROIN PAIN, LEFT: ICD-10-CM

## 2018-09-25 NOTE — TELEPHONE ENCOUNTER
Requested Prescriptions   Pending Prescriptions Disp Refills     gabapentin (NEURONTIN) 100 MG capsule [Pharmacy Med Name: GABAPENTIN 100 MG CAPSULE]        Last Written Prescription Date:  3/19/2018  Last Fill Quantity: 180,   # refills: 1  Last Office Visit: 2/19/2018  Future Office visit:       Routing refill request to provider for review/approval because:  Drug not on the [facility], [facility] or [facility] refill protocol or controlled substance   180 capsule 1     Sig: TAKE 2 CAPSULES BY MOUTH AT BEDTIME    There is no refill protocol information for this order

## 2018-09-26 NOTE — TELEPHONE ENCOUNTER
checked-no concerns.    Routing refill request to provider for review/approval because:  Drug not on the [facility] refill protocol     Jacque Benitez RN

## 2018-09-27 RX ORDER — GABAPENTIN 100 MG/1
CAPSULE ORAL
Qty: 180 CAPSULE | Refills: 3 | Status: SHIPPED | OUTPATIENT
Start: 2018-09-27 | End: 2020-04-28

## 2018-12-06 ENCOUNTER — TELEPHONE (OUTPATIENT)
Dept: PEDIATRICS | Facility: CLINIC | Age: 43
End: 2018-12-06

## 2018-12-06 NOTE — TELEPHONE ENCOUNTER
Called [facility] to see if there is a part of their address we are missing:    They are insurance (part of [employer]) they are not a pharmacy.    If they are using [employer] insurance, they should use a [facility].    If the patient wants Mail Order, he can use the [facility], Phone: [phone].    Attempted to call patient to explain but he did not answer. LVM to return call.    Lillie WADSWORTH Triage RN

## 2018-12-06 NOTE — TELEPHONE ENCOUNTER
Left message for patient to schedule an appt. With . It has been a year. Not sure how to find this pharmacy. Patient might need a written rx  And take it to that pharmacy.  Karime, [facility]

## 2018-12-06 NOTE — TELEPHONE ENCOUNTER
Patient recently found that he can get his Advair script through [facility]. Patient would like to change his pharmacy to this for this one medication. I tried to find [facility] when selecting his preferred pharmacy but I have no been able to locate it. The patient is not currently in need of a refill at this time but was hoping to get his preferred pharmacy setup for the future.    The patient gave me the below information to try and find the pharmacy within our system:    Pharmacy: [facility]  Website: [email or web address]  Address: [address] [place] [address], [address], [place] [address]  Fax: [phone]  Phone: [phone]  Toll free: [phone]    I have asked a couple of different people (ISAAC, RN, Administration) in an attempt to figure out how to get this pharmacy as his preferred pharmacy but I/we have not had luck. If you know how to set [facility] as this patient's preferred pharmacy for Advair, please do.    If you have questions for the patient, please call at:    Home Phone [phone]   Mobile [phone]     Maddie MCKEON - ISAAC/NEMESIO  12/6/2018 1:59 PM

## 2018-12-07 NOTE — TELEPHONE ENCOUNTER
Non-detailed message left to return our call.  Lillie Best RN - Triage  [facility]

## 2018-12-07 NOTE — TELEPHONE ENCOUNTER
Pt informed of information regarding insurance/pharmacy.  Also informed pt that he was due for an office visit.  Pt scheduled an office visit for 12/10/18 at 1400 with .    Jacque Benitez RN

## 2018-12-10 ENCOUNTER — OFFICE VISIT (OUTPATIENT)
Dept: PEDIATRICS | Facility: CLINIC | Age: 43
End: 2018-12-10
Payer: COMMERCIAL

## 2018-12-10 VITALS
HEIGHT: 72 IN | TEMPERATURE: 98.5 F | BODY MASS INDEX: 29.53 KG/M2 | WEIGHT: 218 LBS | OXYGEN SATURATION: 99 % | HEART RATE: 77 BPM | DIASTOLIC BLOOD PRESSURE: 70 MMHG | SYSTOLIC BLOOD PRESSURE: 118 MMHG

## 2018-12-10 DIAGNOSIS — N50.812 LEFT TESTICULAR PAIN: ICD-10-CM

## 2018-12-10 DIAGNOSIS — G89.29 OTHER CHRONIC PAIN: ICD-10-CM

## 2018-12-10 DIAGNOSIS — M54.16 LUMBAR RADICULOPATHY: ICD-10-CM

## 2018-12-10 DIAGNOSIS — N52.9 ERECTILE DYSFUNCTION, UNSPECIFIED ERECTILE DYSFUNCTION TYPE: ICD-10-CM

## 2018-12-10 DIAGNOSIS — J45.30 MILD PERSISTENT ASTHMA WITHOUT COMPLICATION: Primary | ICD-10-CM

## 2018-12-10 PROCEDURE — 99214 OFFICE O/P EST MOD 30 MIN: CPT | Performed by: INTERNAL MEDICINE

## 2018-12-10 RX ORDER — ALBUTEROL SULFATE 90 UG/1
2 AEROSOL, METERED RESPIRATORY (INHALATION) EVERY 4 HOURS PRN
Qty: 3 INHALER | Refills: 3 | Status: SHIPPED | OUTPATIENT
Start: 2018-12-10 | End: 2020-12-29

## 2018-12-10 RX ORDER — PREGABALIN 25 MG/1
CAPSULE ORAL
Qty: 74 CAPSULE | Refills: 3 | Status: SHIPPED | OUTPATIENT
Start: 2018-12-10 | End: 2020-04-28

## 2018-12-10 RX ORDER — SILDENAFIL 100 MG/1
100 TABLET, FILM COATED ORAL DAILY PRN
Qty: 12 TABLET | Refills: 6 | Status: SHIPPED | OUTPATIENT
Start: 2018-12-10 | End: 2020-12-14

## 2018-12-10 ASSESSMENT — MIFFLIN-ST. JEOR: SCORE: 1916.84
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
## 2018-12-10 NOTE — LETTER
My Asthma Action Plan  Name: Romario Lizarraga   YOB: 1975  Date: 12/10/2018   My doctor: Daniel Bauer MD, MD   My clinic: [facility]        My Control Medicine: Fluticasone + salmeterol (Advair) -  Diskus 250/50 mcg daily  My Rescue Medicine: Albuterol (Proair/Ventolin/Proventil) inhaler 108   My Asthma Severity: mild persistent  Avoid your asthma triggers: smoke  smoke  exercise or sports            GREEN ZONE   Good Control    I feel good    No cough or wheeze    Can work, sleep and play without asthma symptoms       Take your asthma control medicine every day.     1. If exercise triggers your asthma, take your rescue medication    15 minutes before exercise or sports, and    During exercise if you have asthma symptoms  2. Spacer to use with inhaler: If you have a spacer, make sure to use it with your inhaler             YELLOW ZONE Getting Worse  I have ANY of these:    I do not feel good    Cough or wheeze    Chest feels tight    Wake up at night   1. Keep taking your Green Zone medications  2. Start taking your rescue medicine:    every 20 minutes for up to 1 hour. Then every 4 hours for 24-48 hours.  3. If you stay in the Yellow Zone for more than 12-24 hours, contact your doctor.  4. If you do not return to the Green Zone in 12-24 hours or you get worse, start taking your oral steroid medicine if prescribed by your provider.           RED ZONE Medical Alert - Get Help  I have ANY of these:    I feel awful    Medicine is not helping    Breathing getting harder    Trouble walking or talking    Nose opens wide to breathe       1. Take your rescue medicine NOW  2. If your provider has prescribed an oral steroid medicine, start taking it NOW  3. Call your doctor NOW  4. If you are still in the Red Zone after 20 minutes and you have not reached your doctor:    Take your rescue medicine again and    Call 911 or go to the emergency room right away    See your regular doctor within 2 weeks  of an Emergency Room or Urgent Care visit for follow-up treatment.          Annual Reminders:  Meet with Asthma Educator,  Flu Shot in the Fall, consider Pneumonia Vaccination for patients with asthma (aged 19 and older).    Pharmacy:    [id number] - [place], [place] - [address] [facility] - [place], [place] - [address]                      Asthma Triggers  How To Control Things That Make Your Asthma Worse    Triggers are things that make your asthma worse.  Look at the list below to help you find your triggers and what you can do about them.  You can help prevent asthma flare-ups by staying away from your triggers.      Trigger                                                          What you can do   Cigarette Smoke  Tobacco smoke can make asthma worse. Do not allow smoking in your home, car or around you.  Be sure no one smokes at a child s day care or school.  If you smoke, ask your health care provider for ways to help you quit.  Ask family members to quit too.  Ask your health care provider for a referral to Quit Plan to help you quit smoking, or call 3-769-137-PLAN.     Colds, Flu, Bronchitis  These are common triggers of asthma. Wash your hands often.  Don t touch your eyes, nose or mouth.  Get a flu shot every year.     Dust Mites  These are tiny bugs that live in cloth or carpet. They are too small to see. Wash sheets and blankets in hot water every week.   Encase pillows and mattress in dust mite proof covers.  Avoid having carpet if you can. If you have carpet, vacuum weekly.   Use a dust mask and HEPA vacuum.   Pollen and Outdoor Mold  Some people are allergic to trees, grass, or weed pollen, or molds. Try to keep your windows closed.  Limit time out doors when pollen count is high.   Ask you health care provider about taking medicine during allergy season.     Animal Dander  Some people are allergic to skin flakes, urine or saliva from pets  with fur or feathers. Keep pets with fur or feathers out of your home.    If you can t keep the pet outdoors, then keep the pet out of your bedroom.  Keep the bedroom door closed.  Keep pets off cloth furniture and away from stuffed toys.     Mice, Rats, and Cockroaches  Some people are allergic to the waste from these pests.   Cover food and garbage.  Clean up spills and food crumbs.  Store grease in the refrigerator.   Keep food out of the bedroom.   Indoor Mold  This can be a trigger if your home has high moisture. Fix leaking faucets, pipes, or other sources of water.   Clean moldy surfaces.  Dehumidify basement if it is damp and smelly.   Smoke, Strong Odors, and Sprays  These can reduce air quality. Stay away from strong odors and sprays, such as perfume, powder, hair spray, paints, smoke incense, paint, cleaning products, candles and new carpet.   Exercise or Sports  Some people with asthma have this trigger. Be active!  Ask your doctor about taking medicine before sports or exercise to prevent symptoms.    Warm up for 5-10 minutes before and after sports or exercise.     Other Triggers of Asthma  Cold air:  Cover your nose and mouth with a scarf.  Sometimes laughing or crying can be a trigger.  Some medicines and food can trigger asthma.

## 2018-12-10 NOTE — PATIENT INSTRUCTIONS
INSTRUCTIONS FOR TODAY:     groin pain.  Start Lyrica (may cause sleepiness).   Follow-up visit in 4 weeks   resume back therapy exercises.  Core strengthening and low impact exercise       Dr Bauer

## 2018-12-11 ASSESSMENT — ASTHMA QUESTIONNAIRES: ACT_TOTALSCORE: 24

## 2019-01-16 DIAGNOSIS — J45.30 MILD PERSISTENT ASTHMA WITHOUT COMPLICATION: ICD-10-CM

## 2019-01-16 NOTE — TELEPHONE ENCOUNTER
Not due for a refill.     fluticasone-salmeterol (ADVAIR) 250-50 MCG/DOSE inhaler was filled on 12/10/2018, qty 3 inhaler with 3 refills.

## 2019-01-17 NOTE — TELEPHONE ENCOUNTER
Change of pharmacy.   Prescription approved per [facility] Refill Protocol.  Mary Valenzuela RN  Message handled by Nurse Triage.

## 2019-05-01 ENCOUNTER — OFFICE VISIT (OUTPATIENT)
Dept: FAMILY MEDICINE | Facility: CLINIC | Age: 44
End: 2019-05-01
Payer: COMMERCIAL

## 2019-05-01 VITALS
BODY MASS INDEX: 26.95 KG/M2 | HEIGHT: 72 IN | SYSTOLIC BLOOD PRESSURE: 129 MMHG | TEMPERATURE: 97.9 F | WEIGHT: 199 LBS | HEART RATE: 64 BPM | DIASTOLIC BLOOD PRESSURE: 84 MMHG | RESPIRATION RATE: 16 BRPM | OXYGEN SATURATION: 97 %

## 2019-05-01 DIAGNOSIS — Z71.84 ENCOUNTER FOR COUNSELING FOR TRAVEL: Primary | ICD-10-CM

## 2019-05-01 PROCEDURE — 99401 PREV MED CNSL INDIV APPRX 15: CPT | Performed by: PHYSICIAN ASSISTANT

## 2019-05-01 RX ORDER — AZITHROMYCIN 500 MG/1
500 TABLET, FILM COATED ORAL DAILY
Qty: 3 TABLET | Refills: 0 | Status: SHIPPED | OUTPATIENT
Start: 2019-05-01 | End: 2019-05-04

## 2019-05-01 ASSESSMENT — MIFFLIN-ST. JEOR: SCORE: 1830.66

## 2019-05-01 NOTE — PROGRESS NOTES
SUBJECTIVE: Romario Lizarraga , a 43 year old  male, presents for counseling and information regarding upcoming travel to [place]. Special medical concerns include: none. He anticipates the following unusual exposures: none.    Itinerary:  [place]    Departure Date: 6/15/19 Return date: 6/29/19    Reason for travel (i.e. Business, pleasure): pleasure     Visiting an urban or rural area?: both     Accommodations (i.e. hotel, hostel, friends, family, etc): BNB    Women - First day of your last period: NA    IMMUNIZATION HISTORY  Have you received any vaccinations in the past 4 weeks?  No  Have you ever fainted from having your blood drawn or from an injection?  No  Have you ever had a fever reaction to vaccination?  No  Have you ever had any bad reaction or side effect from any vaccination?  No  Have you ever had hepatitis A or B vaccine?  No  Do you live (or work closely) with anyone who has AIDS, an AIDS-like condition, any other immune disorder or who is on chemotherapy for cancer?  No  Have you received any injection of immune globulin or any blood products during the past 12 months?  No    GENERAL MEDICAL HISTORY  Do you have a medical condition that warrants maintenance medication or physician follow-up?  yes  Do you have a medical condition that is stable now, but that may recur while traveling?  Yes   Has your spleen been removed?  No  Have you had an acute illness or a fever in the past 48 hours?  No  Are you pregnant, or might you become pregnant on this trip?  Any chance of pregnancy?  No  Are you breastfeeding?  No  Do you have HIV, AIDS, an AIDS-like condition, any other immune disorder, leukemia or cancer?  No  Do you have a severe combined immunodeficiency disease?  No  Have you had your thymus gland removed or history of problems with your thymus, such as myasthenia gravis, DiGeorge syndrome, or thymoma?  No    Do you have severe thrombocytopenia (low platelet count) or a coagulation disorder?  No  Have you  ever had a convulsion, seizure, epilepsy, neurologic condition or brain infection?  No  Do you have any stomach conditions?  No  Do you have a G6PD deficiency?  No  Do you have severe renal or kidney impairment?  No  Do you have a history of psychiatric problems?  No  Do you have a problem with strange dreams and/or nightmares?  No  Do you have insomnia?  No  Do you have problems with vaginitis?  No  Do you have psoriasis?  No  Are you prone to motion sickness?  No  Have you ever had headaches, nausea, vomiting, or breathing problems from altitude exposure?  No      Past Medical History:   Diagnosis Date     Low testosterone 12/11/2013      Immunization History   Administered Date(s) Administered     HEPA 03/13/2007, 11/27/2007     HepB 03/13/2007, 11/27/2007, 03/10/2009     Influenza (IIV3) PF 12/11/2013     Influenza Vaccine IM 3yrs+ 4 Valent IIV4 12/11/2013, 11/15/2016     MMR 01/02/2013     Meningococcal (Menomune ) 03/10/2009     Pneumococcal 23 valent 08/07/2014     Poliovirus, inactivated (IPV) 03/15/2007     Rabies - IM Fibroblast Culture 12/15/2015, 12/22/2015, 01/05/2016     TDAP Vaccine (Adacel) 08/07/2014     Tdap (Adacel,Boostrix) 03/09/2007     Typhoid IM 03/13/2007, 01/02/2013     Typhoid Oral 12/15/2015     Yellow Fever 01/02/2013       Current Outpatient Medications   Medication Sig Dispense Refill     ADVAIR DISKUS 250-50 MCG/DOSE inhaler INHALE 1 PUFF INTO THE LUNGS 2 TIMES DAILY 3 Inhaler 11     albuterol (PROAIR HFA/PROVENTIL HFA/VENTOLIN HFA) 108 (90 Base) MCG/ACT inhaler Inhale 2 puffs into the lungs every 4 hours as needed for shortness of breath / dyspnea 3 Inhaler 3     fluticasone-salmeterol (ADVAIR) 250-50 MCG/DOSE inhaler Inhale 1 puff into the lungs 2 times daily 3 Inhaler 3     gabapentin (NEURONTIN) 100 MG capsule TAKE 2 CAPSULES BY MOUTH AT BEDTIME 180 capsule 3     ondansetron (ZOFRAN ODT) 4 MG ODT tab Take 1-2 tablets (4-8 mg) by mouth every 8 hours as needed for nausea 10 tablet 0      pregabalin (LYRICA) 25 MG capsule Take 1 capsule (25 mg) by mouth daily for 14 days, THEN 1 capsule (25 mg) 2 times daily. 74 capsule 3     sildenafil (VIAGRA) 100 MG tablet Take 1 tablet (100 mg) by mouth daily as needed 12 tablet 6     No Known Allergies     EXAM: deferred    Immunizations discussed include: not needed  Malaria prophylaxis recommended: not needed  Symptomatic treatment for traveler's diarrhea: bismuth subsalicylate, loperamide/diphenoxylate and azithromycin    ASSESSMENT/PLAN:    (Z71.89) Encounter for counseling for travel  (primary encounter diagnosis)    Comment: No vaccines today. Patient will return or follow-up with PCP as needed. Prophylaxis given for Traveler's diarrhea and is not needed for Malaria. All questions were answered.     Plan: azithromycin (ZITHROMAX) 500 MG tablet              I have reviewed general recommendations for safe travel   including: food/water precautions, insect avoidance, safe sex   practices given high prevalence of HIV and other STDs,   roadway safety. Educational materials and links to the CDC   Traveler's health website have been provided.    Total time 15 minutes, greater than 50 percent in counseling   and coordination of care.

## 2019-05-01 NOTE — PATIENT INSTRUCTIONS
See travel packet provided  Recommend ultrathon, pepto bismol and imodium  Also bring hand  and sun screen with you.  Safe Travels

## 2020-01-10 ENCOUNTER — OFFICE VISIT (OUTPATIENT)
Dept: PEDIATRICS | Facility: CLINIC | Age: 45
End: 2020-01-10
Payer: COMMERCIAL

## 2020-01-10 VITALS
DIASTOLIC BLOOD PRESSURE: 70 MMHG | OXYGEN SATURATION: 100 % | WEIGHT: 199 LBS | BODY MASS INDEX: 26.99 KG/M2 | SYSTOLIC BLOOD PRESSURE: 122 MMHG | HEART RATE: 78 BPM | TEMPERATURE: 96.8 F

## 2020-01-10 DIAGNOSIS — B37.2 YEAST INFECTION OF THE SKIN: Primary | ICD-10-CM

## 2020-01-10 PROCEDURE — 99213 OFFICE O/P EST LOW 20 MIN: CPT | Performed by: PHYSICIAN ASSISTANT

## 2020-01-10 NOTE — PATIENT INSTRUCTIONS
Begin terbinafine and hydrocortisone twice daily   Apply fabric/cloth in between  No hot showers  Dry off completely with blow dryer     Call if not better

## 2020-01-10 NOTE — PROGRESS NOTES
Subjective     Romario Lizarraga is a 44 year old male who presents to clinic today for the following health issues:    HPI   Hemorrhoids  Onset: 1 month     Description:   Pain: YES - when itching   Itching: YES    Accompanying Signs & Symptoms:  Blood streaked toilet paper: YES  Blood in stool: no   Changes in stool pattern: no     History:   Any previous GI studies done:none  Family History of colon cancer: YES- grandma     Precipitating factors:   None    Alleviating factors:  None  Therapies Tried and outcome: none    Review of Systems   ROS COMP: Constitutional, HEENT, cardiovascular, pulmonary, gi and gu systems are negative, except as otherwise noted.      Objective    /70 (BP Location: Right arm, Cuff Size: Adult Regular)   Pulse 78   Temp 96.8  F (36  C) (Tympanic)   Wt 90.3 kg (199 lb)   SpO2 100%   BMI 26.99 kg/m    Body mass index is 26.99 kg/m .  Physical Exam   GENERAL: alert and no distress  RECTUM: inspection of the perianal region reveals diffuse erythema with scaling present. No hemorrhoids. No signs of bacterial infection.    Diagnostic Test Results:  none         Assessment & Plan   (B37.2) Yeast infection of the skin  (primary encounter diagnosis)  Comment: begin hydrocortisone and terbinafine twice daily. Discussed conservative measures.   Plan:     Marky Johnson PA-C  [facility]

## 2020-03-12 DIAGNOSIS — J45.30 MILD PERSISTENT ASTHMA WITHOUT COMPLICATION: ICD-10-CM

## 2020-03-16 NOTE — TELEPHONE ENCOUNTER
"Routing refill request to provider for review/approval because:  Marylu given x1 and patient did not follow up, please advise  Patient needs to be seen because it has been more than 1 year since last office visit.              Requested Prescriptions   Pending Prescriptions Disp Refills     fluticasone-salmeterol (ADVAIR) 250-50 MCG/DOSE inhaler 3 Inhaler 3     Sig: Inhale 1 puff into the lungs 2 times daily       Inhaled Steroids Protocol Failed - 3/16/2020 11:22 AM        Failed - Asthma control assessment score within normal limits in last 6 months     Please review ACT score.   ACT Total Scores 12/11/2017 12/11/2017 12/10/2018   ACT TOTAL SCORE - - -   ASTHMA ER VISITS - - -   ASTHMA HOSPITALIZATIONS - - -   ACT TOTAL SCORE (Goal Greater than or Equal to 20) - 25 24   In the past 12 months, how many times did you visit the emergency room for your asthma without being admitted to the hospital? 0 0 0   In the past 12 months, how many times were you hospitalized overnight because of your asthma? 0 0 0               Passed - Patient is age 12 or older        Passed - Medication is active on med list        Passed - Recent (6 mo) or future (30 days) visit within the authorizing provider's specialty     Patient had office visit in the last 6 months or has a visit in the next 30 days with authorizing provider or within the authorizing provider's specialty.  See \"Patient Info\" tab in inbasket, or \"Choose Columns\" in Meds & Orders section of the refill encounter.           Long-Acting Beta Agonist Inhalers Protocol  Failed - 3/16/2020 11:22 AM        Failed - Asthma control assessment score within normal limits in last 6 months     Please review ACT score.           Failed - Order for Serevent, Striverdi, or Foradil and pt has steroid inhaler        Passed - Patient is age 12 or older        Passed - Medication is active on med list        Passed - Recent (6 mo) or future (30 days) visit within the authorizing provider's " "specialty     Patient had office visit in the last 6 months or has a visit in the next 30 days with authorizing provider or within the authorizing provider's specialty.  See \"Patient Info\" tab in inbasket, or \"Choose Columns\" in Meds & Orders section of the refill encounter.                 "

## 2020-04-27 NOTE — PROGRESS NOTES
"Romario is being evaluated via a billable teledermatology visit.             The patient has been notified of following:            \"We have asked you to send in photos via [employer] or e-mail. These photos will be seen and reviewed by an MD or PA-C.  A telederm visit is not as thorough as an in-person visit, photo assessment does not replace an in-person skin exam.  The quality of the photograph sent may not be of the same quality as that taken by the dermatology clinic. With that being said, we have found that certain health care needs can be provided without the need for a physical exam.  This service lets us provide the care you need with a short phone conversation. If prescriptions are needed we can send directly to your pharmacy.If lab work is needed we can place an order for that and you can then stop by our lab to have the test done at a later time. An MD/PA/Resident will call you around the time of your visit. This may be from a blocked number.     This is a billable visit. If during the course of the call the physician/provider feels a telephone visit is not appropriate, you will not be charged for this service.            Patient has given verbal consent for Telephone visit?  Yes           The patient would like to proceed with an teledermatology because of the COVID Pandemic.     Patient complains of  Mole check of images      I have reviewed and updated the patient's Past Medical History, Social History, Family History and Medication List.     ALLERGIES REVIEWED?  yes  "

## 2020-04-28 ENCOUNTER — VIRTUAL VISIT (OUTPATIENT)
Dept: DERMATOLOGY | Facility: CLINIC | Age: 45
End: 2020-04-28
Payer: COMMERCIAL

## 2020-04-28 DIAGNOSIS — D22.9 MULTIPLE NEVI: Primary | ICD-10-CM

## 2020-04-28 PROCEDURE — 99202 OFFICE O/P NEW SF 15 MIN: CPT | Mod: TEL | Performed by: DERMATOLOGY

## 2020-04-28 NOTE — PATIENT INSTRUCTIONS
Mr. Lizarraga,   Your moles look healthy. I would like to recheck the mole on the back with 2 colors later this summer. We will call to schedule.       The ABCDEs of Melanoma  Asymmetry, Border (irregularity), Color (not uniform, changes in color), Diameter (greater than 6 mm which is about the size of a pencil eraser), and Evolving (any changes in preexisting moles)    Skin cancer can develop anywhere on the skin. Ask someone for help when checking your skin, especially in hard to see places. If you notice a mole different from others, or that changes, enlarges, itches, or bleeds (even if it is small), you should see a dermatologist.      [facility] Teledermatology Visit          When should I call my doctor?    If you are worsening or not improving, please, contact us or seek urgent care as noted below.     Who should I call with questions (adults)?    [facility] (adult and pediatric): [phone]     [facility] (adult): [phone]    For urgent needs outside of business hours call the [facility] at [phone] and ask for the dermatology resident on call    If this is a medical emergency and you are unable to reach an ER, Call [phone]      Who should I call with questions (pediatric)?  [facility]- Pediatric Dermatology  Dr. Maura Grullon, Dr. Taiwo Valerio, Dr. Sandie Ríos, Jenna Perez, PA  Dr. Lina Dougherty, Dr. Mala Petty & Dr. Jermaine Cruz  Non Urgent  Nurse Triage Line; [phone]- Hannah and Zoe [facility] Care Coordinators   Lina (/Complex ) [phone]    If you need a prescription refill, please contact your pharmacy. Refills are approved or denied by our Physicians during normal business hours, Monday through Fridays  Per office policy, refills will not be granted if you have not been seen within the past year (or sooner depending on your child's  condition)    Scheduling Information:  Pediatric Appointment Scheduling and Call Center [phone]  Radiology Scheduling- [phone]  Sedation Unit Scheduling- [phone]  [place] Scheduling- General [phone]; Pediatric Dermatology [phone]  Main  Services: [phone]  Nicaraguan: [phone]  Kenyan: [phone]  Hmong/Chinese/Turkish: [phone]  Preadmission Nursing Department Fax Number: [phone] (Fax all pre-operative paperwork to this number)    For urgent matters arising during evenings, weekends, or holidays that cannot wait for normal business hours please call [phone] and ask for the Dermatology Resident On-Call to be paged.

## 2020-04-28 NOTE — PROGRESS NOTES
[facility] (Store and Forward ((National Emergency Concerning the CORONAVIRUS (COVID 19), preferred for return patients. )    Image quality and interpretability: acceptable    Physician has received verbal consent for a Video/Photos Visit from the patient? Yes    In-person dermatology visit recommendation: no    Consent has been obtained for this service by 1 care team member: yes.     Teledermatology information:  - Location of patient: Home  - Location [facility] tel[facility]ermatologist[facility]  ([facility] (Dr. R[facility]os, [place], [place])  - Reason teledermatology is appropriate:  of National Emergency Regarding Coronavirus disease (COVID 19) Outbreak  - Method of transmission:  Store and Forward ((National Emergency Concerning the CORONAVIRUS (COVID 19), preferred for return patients.   - Date of images: 04/28/20  - Service start time: 1118  - Service end time:1127  - Date of report: 04/28/20      ___________________________________________________________________________      Dermatology Clinic Note    Dermatology Problem List:  1. Benign pigmented nevi     Assessment and Plan:    1. Benign pigmented nevi: Single nevus with eccentric pigmentation that I would recommend rechecking to ensure no changes. Sun protection recommended. Discussed ABCDEs of malignant melanoma.     Thank you for involving me in this patient's care.     RTC for in person visit in 3-4 months.     Sandie Ríos MD  Dermatology Staff    CC:     Daniel Bauer MD, MD    ____________________________________________________________________________________________________________________________________________    CC: Patient presents with:  teledermatology: phone visit with photo review    HPI: Romario Lizarraga is a 44 year old male presenting for initial evaluation of nevi. No history of skin cancer. No lesions with pain or bleeding. History of extensive sun exposure growing up. No changes in moles that patient can tell. No history        Patient Active Problem List   Diagnosis     CARDIOVASCULAR SCREENING; LDL GOAL LESS THAN 160     Seborrheic dermatitis     Mild persistent asthma without complication       No Known Allergies      Current Outpatient Medications   Medication     ADVAIR DISKUS 250-50 MCG/DOSE inhaler     albuterol (PROAIR HFA/PROVENTIL HFA/VENTOLIN HFA) 108 (90 Base) MCG/ACT inhaler     fluticasone-salmeterol (ADVAIR) 250-50 MCG/DOSE inhaler     gabapentin (NEURONTIN) 100 MG capsule     ondansetron (ZOFRAN ODT) 4 MG ODT tab     pregabalin (LYRICA) 25 MG capsule     sildenafil (VIAGRA) 100 MG tablet     No current facility-administered medications for this visit.        Family History   Problem Relation Age of Onset     Hypertension Father      Cancer Maternal Grandmother         lung     Heart Disease Maternal Grandfather         copd     Cancer - colorectal Paternal Grandmother      Heart Disease Paternal Grandfather      Cancer Maternal Uncle 50        prostate     Cancer Paternal Aunt         leukemia     Breast Cancer Paternal Aunt      Diabetes Paternal Uncle        Social History     Tobacco Use     Smoking status: Former Smoker     Smokeless tobacco: Never Used   Substance Use Topics     Alcohol use: Yes     Comment: occ     Drug use: No           ROS: Patient in normal state of health and is feeling well on complete ROS.     EXAM:  There were no vitals taken for this visit.  GEN: Alert, no distress  Psych: Normal mood/affect  NEURO: A/O x3  SKIN:   --Scattered circular macules, light and dark brown  --eccentric hyperpigmentation at periphery of lesion on the , others uniform in border and pigmentation  --Cherry red 1 mm papules  --Scattered light brown 1 mm macules

## 2020-05-07 ENCOUNTER — TELEPHONE (OUTPATIENT)
Dept: PEDIATRICS | Facility: CLINIC | Age: 45
End: 2020-05-07

## 2020-05-07 NOTE — TELEPHONE ENCOUNTER
1st attempt:  Patient's appointment 05/13/20 needs to be rescheduled 90 days out.  Left message for patient informing him of this.  Requested a call back to reschedule, and provided clinic number.    Shawna Alvarado

## 2020-05-13 NOTE — TELEPHONE ENCOUNTER
2nd attempt 5/13: lvm for pt to call back and reschedule.  Clinic phone number left in message.     Marla Velasquez

## 2020-06-09 ENCOUNTER — TELEPHONE (OUTPATIENT)
Dept: DERMATOLOGY | Facility: CLINIC | Age: 45
End: 2020-06-09

## 2020-06-09 NOTE — TELEPHONE ENCOUNTER
Patient Returning Call  Reason for call:  Appointment / call back  Information relayed to patient: staff will call him   Patient has additional questions:  Yes  What are your questions/concerns:  Pt is needing to schedule a return visit with Dr Ríos, but wants a date / time in Sept, however provider's schedule is not loaded yet for that time.  Please call Romario when Dr Ríos's Sept schedule is loaded to assist.  Thanks.   Okay to leave a detailed message?: Yes at Cell number on file:    Telephone Information:   Mobile [phone]

## 2020-09-15 ENCOUNTER — OFFICE VISIT (OUTPATIENT)
Dept: DERMATOLOGY | Facility: CLINIC | Age: 45
End: 2020-09-15
Payer: COMMERCIAL

## 2020-09-15 DIAGNOSIS — L73.9 FOLLICULITIS: ICD-10-CM

## 2020-09-15 DIAGNOSIS — L30.1 DYSHIDROTIC DERMATITIS: ICD-10-CM

## 2020-09-15 DIAGNOSIS — D22.9 MULTIPLE NEVI: Primary | ICD-10-CM

## 2020-09-15 PROCEDURE — 99214 OFFICE O/P EST MOD 30 MIN: CPT | Performed by: DERMATOLOGY

## 2020-09-15 RX ORDER — GABAPENTIN 100 MG/1
400 CAPSULE ORAL 4 TIMES DAILY
COMMUNITY
End: 2020-12-14

## 2020-09-15 RX ORDER — IBUPROFEN 200 MG
800 TABLET ORAL EVERY 4 HOURS PRN
COMMUNITY

## 2020-09-15 NOTE — PATIENT INSTRUCTIONS
Pediatric Dermatology  [facility]  [address]  1st [place]  [address]  Phone: [phone]    Pediatric & Adult Dermatology  [facility]  [address] [facility]   2nd Floor  [facility] [address]  [phone]                  General information: Dr. Sandie Ríos is a board-certified dermatologist with subspecialty certification in pediatric dermatology.     Scheduling and Nurse Triage: Dr. Ríos sees pediatric patients on Mondays in [place] and adult and pediatric patients on Tuesdays in [place]. The remainder of the week she practices at the [facility]. Please call the above phone numbers to schedule or to talk to a nurse.     -For scheduling at the [place] or [place] locations, or to talk to the triage nurse please call the above phone number at the clinic where you were seen.     -For medication refills, please call your pharmacy.

## 2020-09-15 NOTE — PROGRESS NOTES
[facility] ([facility] ((National Emergency Concerning the CORONAVIRUS (COVID 19), preferred for return patients. )    Image quality and interpretability: acceptable    Physician has received verbal consent for a Video/Photos Visit from the patient? Yes    In-person dermatology visit recommendation: no    Consent has been obtained for this service by 1 care team member: yes.     Teledermatology information:  - Location of patient: Home  - Location [facility]ermatologist[facility]  ([facility] (Dr. Ríos, [place], [place])  - Reason teledermatology is appropriate:  of National Emergency Regarding Coronavirus disease (COVID 19) Outbreak  - Method of transmission:  Store and Forward ((National Emergency Concerning the CORONAVIRUS (COVID 19), preferred for return patients.   - Date of images: 04/28/20  - Service start time: 1118  - Service end time:1127  - Date of report: 04/28/20      ___________________________________________________________________________      Dermatology Clinic Note    Dermatology Problem List:  1. Benign pigmented nevi   2. Intertrigo  3. Dyshidrotic dermatitis  4. Folliculitis of the shoulders    Assessment and Plan:    1. Benign pigmented nevi: Single nevus with eccentric pigmentation that I would recommend rechecking to ensure no changes. Sun protection recommended. Discussed ABCDEs of malignant melanoma.     2. Folliculitis with secondary erosions on the upper arms. Bacterial vs pityrosporum folliculitis. Could trial over the counter nizoral or H&S shampoo vs benzoyl peroxide wash.     3. Dyshidrotic dermatitis of the feet. Patient declines treatment at this time.     4. Perirectal papule: Suspect milia/ small inclusion cyst. I do not think that I could successfully remove at this time due to small size and not bothersome. Monitor.     Thank you for involving me in this patient's care.     RTC every 2-3 years as needed.     Sandie Ríos MD  Dermatology Staff    CC:      Daniel Bauer MD, MD    ____________________________________________________________________________________________________________________________________________    CC: Patient presents with:  RECHECK: PCP is Dr. Bauer, here for annual skin and mole check     HPI: Romario Lizarraga is a 44 year old male presenting for initial evaluation of nevi. Seen via virtual visit this spring and an in person exam was suggested. No history of skin cancer. Has used tanning beds previously. Usually wears SPF when outdoors now. Notes that he gets a groin rash at times that clears with lotrimin. He has a bump near the rectum that does not bother him, but he would like to have examined. Notes that he gets small bumps on the arms. They don't itch, but he does pick at them.       Patient Active Problem List   Diagnosis     CARDIOVASCULAR SCREENING; LDL GOAL LESS THAN 160     Seborrheic dermatitis     Mild persistent asthma without complication       No Known Allergies      Current Outpatient Medications   Medication     albuterol (PROAIR HFA/PROVENTIL HFA/VENTOLIN HFA) 108 (90 Base) MCG/ACT inhaler     fluticasone-salmeterol (ADVAIR) 250-50 MCG/DOSE inhaler     gabapentin (NEURONTIN) 100 MG capsule     ibuprofen (ADVIL/MOTRIN) 200 MG tablet     sildenafil (VIAGRA) 100 MG tablet     ADVAIR DISKUS 250-50 MCG/DOSE inhaler     No current facility-administered medications for this visit.        Family History   Problem Relation Age of Onset     Hypertension Father      Cancer Maternal Grandmother         lung     Heart Disease Maternal Grandfather         copd     Cancer - colorectal Paternal Grandmother      Heart Disease Paternal Grandfather      Cancer Maternal Uncle 50        prostate     Cancer Paternal Aunt         leukemia     Breast Cancer Paternal Aunt      Diabetes Paternal Uncle        Social History     Tobacco Use     Smoking status: Former Smoker     Smokeless tobacco: Never Used   Substance Use Topics     Alcohol use: Yes      Comment: occ     Drug use: No   . Has 2 daughters. Wife is ED MD.         ROS: Patient in normal state of health and is feeling well on complete ROS. No fevers, cough, rash, GI upset, headaches, rhinorrhea, or other rashes.      EXAM:  PHYSICAL EXAM:  There were no vitals taken for this visit.  Gen: Alert. No distress  NEURO: A/Ox3  MS: No joint deformity  PSYCH: Normal mood/affect  HEENT: Conjunctivae clear  Pulm: Breathing comfortably on RA  Abd: No abdominal distension  CV: Extremities warm and well perfused  Exam of the face, neck, chest, abdomen, back, arms, legs, hands, feet, buttocks. Normal except as follows:  Scattered medium and dark brown 3-4 mm macules on the upper and lower back, thighs, forearms  R and L abdomen with tan papules slightly raised with reticular network at the periphery  Xerotic scaling on the plantar feet bilaterally  Approx 2 mm white papule on the anterior anal verge  Beard in place  Scattered pink papules with RBC crusting on the lateral shoulders and upper back

## 2020-09-15 NOTE — LETTER
9/15/2020      RE: Romario Dean Elham  [address]  [facility] [place] [address]       [facility] (Store and Forward ((National Emergency Concerning the CORONAVIRUS (COVID 19), preferred for return patients. )    Image quality and interpretability: acceptable    Physician has received verbal consent for a Video/Photos Visit from the patient? Yes    In-person dermatology visit recommendation: no    Consent has been obtained for this service by 1 care team member: yes.     Teledermatology information:  - Location of patient: Home  - Location of teledermatologist:  ([facility] (Dr. Ríos, [place], [place])  - Reason teledermatology is appropriate:  of National Emergency Regarding Coronavirus disease (COVID 19) Outbreak  - Method of transmission:  Store and Forward ((National Emergency Concerning the CORONAVIRUS (COVID 19), preferred for return patients.   - Date of images: 04/28/20  - Service start time: 1118  - Service end time:1127  - Date of report: 04/28/20      ___________________________________________________________________________      Dermatology Clinic Note    Dermatology Problem List:  1. Benign pigmented nevi   2. Intertrigo  3. Dyshidrotic dermatitis  4. Folliculitis of the shoulders    Assessment and Plan:    1. Benign pigmented nevi: Single nevus with eccentric pigmentation that I would recommend rechecking to ensure no changes. Sun protection recommended. Discussed ABCDEs of malignant melanoma.     2. Folliculitis with secondary erosions on the upper arms. Bacterial vs pityrosporum folliculitis. Could trial over the counter nizoral or H&S shampoo vs benzoyl peroxide wash.     3. Dyshidrotic dermatitis of the feet. Patient declines treatment at this time.     4. Perirectal papule: Suspect milia/ small inclusion cyst. I do not think that I could successfully remove at this time due to small size and not bothersome. Monitor.     Thank you for involving me in this  patient's care.     RTC every 2-3 years as needed.     Sandie Ríos MD  Dermatology Staff    CC:     Daniel Bauer MD, MD    ____________________________________________________________________________________________________________________________________________    CC: Patient presents with:  RECHECK: PCP is Dr. Bauer, here for annual skin and mole check     HPI: Romario Lizarraga is a 44 year old male presenting for initial evaluation of nevi. Seen via virtual visit this spring and an in person exam was suggested. No history of skin cancer. Has used tanning beds previously. Usually wears SPF when outdoors now. Notes that he gets a groin rash at times that clears with lotrimin. He has a bump near the rectum that does not bother him, but he would like to have examined. Notes that he gets small bumps on the arms. They don't itch, but he does pick at them.       Patient Active Problem List   Diagnosis     CARDIOVASCULAR SCREENING; LDL GOAL LESS THAN 160     Seborrheic dermatitis     Mild persistent asthma without complication       No Known Allergies      Current Outpatient Medications   Medication     albuterol (PROAIR HFA/PROVENTIL HFA/VENTOLIN HFA) 108 (90 Base) MCG/ACT inhaler     fluticasone-salmeterol (ADVAIR) 250-50 MCG/DOSE inhaler     gabapentin (NEURONTIN) 100 MG capsule     ibuprofen (ADVIL/MOTRIN) 200 MG tablet     sildenafil (VIAGRA) 100 MG tablet     ADVAIR DISKUS 250-50 MCG/DOSE inhaler     No current facility-administered medications for this visit.        Family History   Problem Relation Age of Onset     Hypertension Father      Cancer Maternal Grandmother         lung     Heart Disease Maternal Grandfather         copd     Cancer - colorectal Paternal Grandmother      Heart Disease Paternal Grandfather      Cancer Maternal Uncle 50        prostate     Cancer Paternal Aunt         leukemia     Breast Cancer Paternal Aunt      Diabetes Paternal Uncle        Social History     Tobacco Use      Smoking status: Former Smoker     Smokeless tobacco: Never Used   Substance Use Topics     Alcohol use: Yes     Comment: occ     Drug use: No   . Has 2 daughters. Wife is ED MD.         ROS: Patient in normal state of health and is feeling well on complete ROS. No fevers, cough, rash, GI upset, headaches, rhinorrhea, or other rashes.      EXAM:  PHYSICAL EXAM:  There were no vitals taken for this visit.  Gen: Alert. No distress  NEURO: A/Ox3  MS: No joint deformity  PSYCH: Normal mood/affect  HEENT: Conjunctivae clear  Pulm: Breathing comfortably on RA  Abd: No abdominal distension  CV: Extremities warm and well perfused  Exam of the face, neck, chest, abdomen, back, arms, legs, hands, feet, buttocks. Normal except as follows:  Scattered medium and dark brown 3-4 mm macules on the upper and lower back, thighs, forearms  R and L abdomen with tan papules slightly raised with reticular network at the periphery  Xerotic scaling on the plantar feet bilaterally  Approx 2 mm white papule on the anterior anal verge  Beard in place  Scattered pink papules with RBC crusting on the lateral shoulders and upper back      Sandie Ríos MD

## 2020-11-05 ENCOUNTER — TELEPHONE (OUTPATIENT)
Dept: PEDIATRICS | Facility: CLINIC | Age: 45
End: 2020-11-05

## 2020-12-14 ENCOUNTER — OFFICE VISIT (OUTPATIENT)
Dept: PEDIATRICS | Facility: CLINIC | Age: 45
End: 2020-12-14
Payer: COMMERCIAL

## 2020-12-14 VITALS
RESPIRATION RATE: 20 BRPM | OXYGEN SATURATION: 100 % | HEART RATE: 85 BPM | WEIGHT: 210.6 LBS | BODY MASS INDEX: 28.53 KG/M2 | TEMPERATURE: 98.1 F | HEIGHT: 72 IN | SYSTOLIC BLOOD PRESSURE: 130 MMHG | DIASTOLIC BLOOD PRESSURE: 80 MMHG

## 2020-12-14 DIAGNOSIS — M54.50 CHRONIC LOW BACK PAIN, UNSPECIFIED BACK PAIN LATERALITY, UNSPECIFIED WHETHER SCIATICA PRESENT: ICD-10-CM

## 2020-12-14 DIAGNOSIS — Z00.00 ENCOUNTER FOR ROUTINE ADULT HEALTH EXAMINATION WITHOUT ABNORMAL FINDINGS: Primary | ICD-10-CM

## 2020-12-14 DIAGNOSIS — G89.29 CHRONIC LOW BACK PAIN, UNSPECIFIED BACK PAIN LATERALITY, UNSPECIFIED WHETHER SCIATICA PRESENT: ICD-10-CM

## 2020-12-14 DIAGNOSIS — N52.9 ERECTILE DYSFUNCTION, UNSPECIFIED ERECTILE DYSFUNCTION TYPE: ICD-10-CM

## 2020-12-14 DIAGNOSIS — J45.30 MILD PERSISTENT ASTHMA WITHOUT COMPLICATION: ICD-10-CM

## 2020-12-14 DIAGNOSIS — Z13.6 CARDIOVASCULAR SCREENING; LDL GOAL LESS THAN 160: ICD-10-CM

## 2020-12-14 DIAGNOSIS — D36.9 PAPILLOMA: ICD-10-CM

## 2020-12-14 PROCEDURE — 99396 PREV VISIT EST AGE 40-64: CPT | Performed by: INTERNAL MEDICINE

## 2020-12-14 RX ORDER — SILDENAFIL CITRATE 20 MG/1
TABLET ORAL
Qty: 30 TABLET | Refills: 6 | Status: SHIPPED | OUTPATIENT
Start: 2020-12-14 | End: 2022-11-17

## 2020-12-14 RX ORDER — GABAPENTIN 300 MG/1
300 CAPSULE ORAL 3 TIMES DAILY
Qty: 180 CAPSULE | Refills: 1 | Status: SHIPPED | OUTPATIENT
Start: 2020-12-14 | End: 2020-12-29

## 2020-12-14 ASSESSMENT — ENCOUNTER SYMPTOMS
CONSTIPATION: 0
SHORTNESS OF BREATH: 0
DYSURIA: 0
HEADACHES: 0
PARESTHESIAS: 0
COUGH: 0
NERVOUS/ANXIOUS: 0
HEARTBURN: 0
PALPITATIONS: 0
WEAKNESS: 0
DIZZINESS: 0
NAUSEA: 0
SORE THROAT: 0
JOINT SWELLING: 0
ABDOMINAL PAIN: 0
CHILLS: 0
DIARRHEA: 0
FREQUENCY: 0
MYALGIAS: 0
HEMATOCHEZIA: 0
ARTHRALGIAS: 0
FEVER: 0
HEMATURIA: 0
EYE PAIN: 0

## 2020-12-14 ASSESSMENT — MIFFLIN-ST. JEOR: SCORE: 1867.79
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
## 2020-12-15 ASSESSMENT — ASTHMA QUESTIONNAIRES: ACT_TOTALSCORE: 25

## 2020-12-28 ENCOUNTER — MYC MEDICAL ADVICE (OUTPATIENT)
Dept: PEDIATRICS | Facility: CLINIC | Age: 45
End: 2020-12-28

## 2020-12-28 DIAGNOSIS — J45.30 MILD PERSISTENT ASTHMA WITHOUT COMPLICATION: ICD-10-CM

## 2020-12-28 DIAGNOSIS — N52.9 ERECTILE DYSFUNCTION, UNSPECIFIED ERECTILE DYSFUNCTION TYPE: ICD-10-CM

## 2020-12-28 DIAGNOSIS — G89.29 CHRONIC LOW BACK PAIN, UNSPECIFIED BACK PAIN LATERALITY, UNSPECIFIED WHETHER SCIATICA PRESENT: ICD-10-CM

## 2020-12-28 DIAGNOSIS — M54.50 CHRONIC LOW BACK PAIN, UNSPECIFIED BACK PAIN LATERALITY, UNSPECIFIED WHETHER SCIATICA PRESENT: ICD-10-CM

## 2020-12-28 RX ORDER — SILDENAFIL CITRATE 20 MG/1
TABLET ORAL
Qty: 30 TABLET | Refills: 6 | Status: CANCELLED | OUTPATIENT
Start: 2020-12-28

## 2020-12-28 NOTE — TELEPHONE ENCOUNTER
Pt requesting all medications be sent to new pharmacy: [facility], pended.    Patient also requesting sildenafil dose be changed to 100 mg for cost reasons    Will route to provider to review.

## 2020-12-29 RX ORDER — SILDENAFIL 100 MG/1
100 TABLET, FILM COATED ORAL DAILY PRN
Qty: 12 TABLET | Refills: 11 | Status: SHIPPED | OUTPATIENT
Start: 2020-12-29 | End: 2022-01-03

## 2020-12-29 RX ORDER — ALBUTEROL SULFATE 90 UG/1
2 AEROSOL, METERED RESPIRATORY (INHALATION) EVERY 4 HOURS PRN
Qty: 3 INHALER | Refills: 3 | Status: SHIPPED | OUTPATIENT
Start: 2020-12-29

## 2020-12-29 RX ORDER — GABAPENTIN 300 MG/1
300 CAPSULE ORAL 3 TIMES DAILY PRN
Qty: 180 CAPSULE | Refills: 1 | Status: SHIPPED | OUTPATIENT
Start: 2020-12-29 | End: 2021-09-16

## 2021-02-18 NOTE — TELEPHONE ENCOUNTER
ACT Total Scores 12/11/2017 12/10/2018 12/14/2020   ACT TOTAL SCORE - - -   ASTHMA ER VISITS - - -   ASTHMA HOSPITALIZATIONS - - -   ACT TOTAL SCORE (Goal Greater than or Equal to 20) 25 24 25   In the past 12 months, how many times did you visit the emergency room for your asthma without being admitted to the hospital? 0 0 0   In the past 12 months, how many times were you hospitalized overnight because of your asthma? 0 0 0     Alexa Ferreira MA

## 2021-09-15 DIAGNOSIS — M54.50 CHRONIC LOW BACK PAIN, UNSPECIFIED BACK PAIN LATERALITY, UNSPECIFIED WHETHER SCIATICA PRESENT: ICD-10-CM

## 2021-09-15 DIAGNOSIS — G89.29 CHRONIC LOW BACK PAIN, UNSPECIFIED BACK PAIN LATERALITY, UNSPECIFIED WHETHER SCIATICA PRESENT: ICD-10-CM

## 2021-09-16 RX ORDER — GABAPENTIN 300 MG/1
CAPSULE ORAL
Qty: 270 CAPSULE | Refills: 1 | Status: SHIPPED | OUTPATIENT
Start: 2021-09-16 | End: 2022-02-10

## 2021-10-24 ENCOUNTER — HEALTH MAINTENANCE LETTER (OUTPATIENT)
Age: 46
End: 2021-10-24

## 2021-12-31 DIAGNOSIS — N52.9 ERECTILE DYSFUNCTION, UNSPECIFIED ERECTILE DYSFUNCTION TYPE: ICD-10-CM

## 2022-01-03 RX ORDER — SILDENAFIL 100 MG/1
TABLET, FILM COATED ORAL
Qty: 12 TABLET | Refills: 0 | Status: SHIPPED | OUTPATIENT
Start: 2022-01-03 | End: 2022-11-17

## 2022-01-03 NOTE — TELEPHONE ENCOUNTER
3 month dusty refill approved. Further refills will be addressed at scheduled visit on 2/10/22.     Ju Pereira RN on 1/3/2022 at 11:22 AM

## 2022-02-07 SDOH — ECONOMIC STABILITY: INCOME INSECURITY: IN THE LAST 12 MONTHS, WAS THERE A TIME WHEN YOU WERE NOT ABLE TO PAY THE MORTGAGE OR RENT ON TIME?: NO

## 2022-02-07 SDOH — HEALTH STABILITY: PHYSICAL HEALTH: ON AVERAGE, HOW MANY MINUTES DO YOU ENGAGE IN EXERCISE AT THIS LEVEL?: 0 MIN

## 2022-02-07 SDOH — ECONOMIC STABILITY: TRANSPORTATION INSECURITY
IN THE PAST 12 MONTHS, HAS THE LACK OF TRANSPORTATION KEPT YOU FROM MEDICAL APPOINTMENTS OR FROM GETTING MEDICATIONS?: NO

## 2022-02-07 SDOH — ECONOMIC STABILITY: FOOD INSECURITY: WITHIN THE PAST 12 MONTHS, YOU WORRIED THAT YOUR FOOD WOULD RUN OUT BEFORE YOU GOT MONEY TO BUY MORE.: NEVER TRUE

## 2022-02-07 SDOH — HEALTH STABILITY: PHYSICAL HEALTH: ON AVERAGE, HOW MANY DAYS PER WEEK DO YOU ENGAGE IN MODERATE TO STRENUOUS EXERCISE (LIKE A BRISK WALK)?: 0 DAYS

## 2022-02-07 SDOH — ECONOMIC STABILITY: INCOME INSECURITY: HOW HARD IS IT FOR YOU TO PAY FOR THE VERY BASICS LIKE FOOD, HOUSING, MEDICAL CARE, AND HEATING?: NOT HARD AT ALL

## 2022-02-07 SDOH — ECONOMIC STABILITY: FOOD INSECURITY: WITHIN THE PAST 12 MONTHS, THE FOOD YOU BOUGHT JUST DIDN'T LAST AND YOU DIDN'T HAVE MONEY TO GET MORE.: NEVER TRUE

## 2022-02-07 SDOH — ECONOMIC STABILITY: TRANSPORTATION INSECURITY
IN THE PAST 12 MONTHS, HAS LACK OF TRANSPORTATION KEPT YOU FROM MEETINGS, WORK, OR FROM GETTING THINGS NEEDED FOR DAILY LIVING?: NO

## 2022-02-07 ASSESSMENT — SOCIAL DETERMINANTS OF HEALTH (SDOH)
IN A TYPICAL WEEK, HOW MANY TIMES DO YOU TALK ON THE PHONE WITH FAMILY, FRIENDS, OR NEIGHBORS?: ONCE A WEEK
HOW OFTEN DO YOU ATTEND CHURCH OR RELIGIOUS SERVICES?: MORE THAN 4 TIMES PER YEAR
DO YOU BELONG TO ANY CLUBS OR ORGANIZATIONS SUCH AS CHURCH GROUPS UNIONS, FRATERNAL OR ATHLETIC GROUPS, OR SCHOOL GROUPS?: NO

## 2022-02-07 ASSESSMENT — LIFESTYLE VARIABLES
HOW OFTEN DO YOU HAVE SIX OR MORE DRINKS ON ONE OCCASION: NEVER
HOW MANY STANDARD DRINKS CONTAINING ALCOHOL DO YOU HAVE ON A TYPICAL DAY: 1 OR 2
HOW OFTEN DO YOU HAVE A DRINK CONTAINING ALCOHOL: 2-3 TIMES A WEEK

## 2022-02-10 ENCOUNTER — OFFICE VISIT (OUTPATIENT)
Dept: PEDIATRICS | Facility: CLINIC | Age: 47
End: 2022-02-10
Payer: COMMERCIAL

## 2022-02-10 VITALS
WEIGHT: 225.4 LBS | DIASTOLIC BLOOD PRESSURE: 90 MMHG | TEMPERATURE: 97.5 F | SYSTOLIC BLOOD PRESSURE: 131 MMHG | BODY MASS INDEX: 30.53 KG/M2 | HEART RATE: 81 BPM | HEIGHT: 72 IN | RESPIRATION RATE: 16 BRPM | OXYGEN SATURATION: 98 %

## 2022-02-10 DIAGNOSIS — M54.16 LUMBAR RADICULOPATHY: ICD-10-CM

## 2022-02-10 DIAGNOSIS — G89.29 CHRONIC LOW BACK PAIN, UNSPECIFIED BACK PAIN LATERALITY, UNSPECIFIED WHETHER SCIATICA PRESENT: ICD-10-CM

## 2022-02-10 DIAGNOSIS — N52.9 ERECTILE DYSFUNCTION, UNSPECIFIED ERECTILE DYSFUNCTION TYPE: ICD-10-CM

## 2022-02-10 DIAGNOSIS — Z12.11 SCREEN FOR COLON CANCER: ICD-10-CM

## 2022-02-10 DIAGNOSIS — G89.29 CHRONIC PAIN IN TESTICLE: ICD-10-CM

## 2022-02-10 DIAGNOSIS — N50.819 CHRONIC PAIN IN TESTICLE: ICD-10-CM

## 2022-02-10 DIAGNOSIS — M54.50 CHRONIC LOW BACK PAIN, UNSPECIFIED BACK PAIN LATERALITY, UNSPECIFIED WHETHER SCIATICA PRESENT: ICD-10-CM

## 2022-02-10 DIAGNOSIS — J45.30 MILD PERSISTENT ASTHMA WITHOUT COMPLICATION: ICD-10-CM

## 2022-02-10 DIAGNOSIS — Z13.220 SCREENING FOR HYPERLIPIDEMIA: ICD-10-CM

## 2022-02-10 DIAGNOSIS — Z00.00 ROUTINE GENERAL MEDICAL EXAMINATION AT A HEALTH CARE FACILITY: Primary | ICD-10-CM

## 2022-02-10 PROCEDURE — 99214 OFFICE O/P EST MOD 30 MIN: CPT | Mod: 25 | Performed by: INTERNAL MEDICINE

## 2022-02-10 PROCEDURE — 99396 PREV VISIT EST AGE 40-64: CPT | Performed by: INTERNAL MEDICINE

## 2022-02-10 RX ORDER — GABAPENTIN 300 MG/1
CAPSULE ORAL
Qty: 270 CAPSULE | Refills: 1 | Status: SHIPPED | OUTPATIENT
Start: 2022-02-10 | End: 2022-11-17

## 2022-02-10 RX ORDER — SILDENAFIL 100 MG/1
100 TABLET, FILM COATED ORAL DAILY PRN
Qty: 30 TABLET | Refills: 6 | Status: SHIPPED | OUTPATIENT
Start: 2022-02-10 | End: 2022-11-17

## 2022-02-10 ASSESSMENT — ENCOUNTER SYMPTOMS
NERVOUS/ANXIOUS: 0
NAUSEA: 0
FEVER: 0
HEARTBURN: 0
HEADACHES: 0
DYSURIA: 0
SHORTNESS OF BREATH: 0
PARESTHESIAS: 0
COUGH: 0
EYE PAIN: 0
ARTHRALGIAS: 0
ABDOMINAL PAIN: 0
PALPITATIONS: 0
WEAKNESS: 0
JOINT SWELLING: 0
FREQUENCY: 0
MYALGIAS: 0
CHILLS: 0
HEMATURIA: 0
DIARRHEA: 0
CONSTIPATION: 0
HEMATOCHEZIA: 0
SORE THROAT: 0
DIZZINESS: 0

## 2022-02-10 ASSESSMENT — ASTHMA QUESTIONNAIRES: ACT_TOTALSCORE: 25

## 2022-02-10 ASSESSMENT — MIFFLIN-ST. JEOR: SCORE: 1929.92
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
## 2022-02-10 NOTE — PATIENT INSTRUCTIONS
[facility] will call to schedule MRI  Will contact you with results  Call to schedule colonoscopy  Return for fasting labdraw      Preventive Health Recommendations  Male Ages 40 to 49    Yearly exam:             See your health care provider every year in order to  o   Review health changes.   o   Discuss preventive care.    o   Review your medicines if your doctor has prescribed any.    You should be tested each year for STDs (sexually transmitted diseases) if you re at risk.     Have a cholesterol test every 5 years.     Have a colonoscopy (test for colon cancer) if someone in your family has had colon cancer or polyps before age 50.     After age 45, have a diabetes test (fasting glucose). If you are at risk for diabetes, you should have this test every 3 years.      Talk with your health care provider about whether or not a prostate cancer screening test (PSA) is right for you.    Shots: Get a flu shot each year. Get a tetanus shot every 10 years.     Nutrition:    Eat at least 5 servings of fruits and vegetables daily.     Eat whole-grain bread, whole-wheat pasta and brown rice instead of white grains and rice.     Get adequate Calcium and Vitamin D.     Lifestyle    Exercise for at least 150 minutes a week (30 minutes a day, 5 days a week). This will help you control your weight and prevent disease.     Limit alcohol to one drink per day.     No smoking.     Wear sunscreen to prevent skin cancer.     See your dentist every six months for an exam and cleaning.

## 2022-02-17 ENCOUNTER — HOSPITAL ENCOUNTER (OUTPATIENT)
Dept: MRI IMAGING | Facility: CLINIC | Age: 47
Discharge: HOME | End: 2022-02-17
Attending: INTERNAL MEDICINE | Admitting: INTERNAL MEDICINE
Payer: COMMERCIAL

## 2022-02-17 DIAGNOSIS — M54.16 LUMBAR RADICULOPATHY: ICD-10-CM

## 2022-02-17 PROCEDURE — 72148 MRI LUMBAR SPINE W/O DYE: CPT

## 2022-03-04 ENCOUNTER — LAB (OUTPATIENT)
Dept: LAB | Facility: CLINIC | Age: 47
End: 2022-03-04
Payer: COMMERCIAL

## 2022-03-04 DIAGNOSIS — Z13.220 SCREENING FOR HYPERLIPIDEMIA: ICD-10-CM

## 2022-03-04 PROCEDURE — 80061 LIPID PANEL: CPT

## 2022-03-04 PROCEDURE — 36415 COLL VENOUS BLD VENIPUNCTURE: CPT

## 2022-03-04 PROCEDURE — 80053 COMPREHEN METABOLIC PANEL: CPT

## 2022-03-05 LAB
ALBUMIN SERPL-MCNC: 3.9 G/DL (ref 3.4–5)
ALP SERPL-CCNC: 46 U/L (ref 40–150)
ALT SERPL W P-5'-P-CCNC: 23 U/L (ref 0–70)
ANION GAP SERPL CALCULATED.3IONS-SCNC: 9 MMOL/L (ref 3–14)
AST SERPL W P-5'-P-CCNC: 15 U/L (ref 0–45)
BILIRUB SERPL-MCNC: 1 MG/DL (ref 0.2–1.3)
BUN SERPL-MCNC: 15 MG/DL (ref 7–30)
CALCIUM SERPL-MCNC: 8.9 MG/DL (ref 8.5–10.1)
CHLORIDE BLD-SCNC: 110 MMOL/L (ref 94–109)
CHOLEST SERPL-MCNC: 211 MG/DL
CO2 SERPL-SCNC: 22 MMOL/L (ref 20–32)
CREAT SERPL-MCNC: 0.91 MG/DL (ref 0.66–1.25)
FASTING STATUS PATIENT QL REPORTED: YES
GFR SERPL CREATININE-BSD FRML MDRD: >90 ML/MIN/1.73M2
GLUCOSE BLD-MCNC: 102 MG/DL (ref 70–99)
HDLC SERPL-MCNC: 49 MG/DL
LDLC SERPL CALC-MCNC: 146 MG/DL
NONHDLC SERPL-MCNC: 162 MG/DL
POTASSIUM BLD-SCNC: 4.2 MMOL/L (ref 3.4–5.3)
PROT SERPL-MCNC: 7.2 G/DL (ref 6.8–8.8)
SODIUM SERPL-SCNC: 141 MMOL/L (ref 133–144)
TRIGL SERPL-MCNC: 78 MG/DL

## 2022-11-17 ENCOUNTER — OFFICE VISIT (OUTPATIENT)
Dept: PEDIATRICS | Facility: CLINIC | Age: 47
End: 2022-11-17
Payer: COMMERCIAL

## 2022-11-17 VITALS
SYSTOLIC BLOOD PRESSURE: 108 MMHG | RESPIRATION RATE: 16 BRPM | BODY MASS INDEX: 26.14 KG/M2 | HEART RATE: 71 BPM | DIASTOLIC BLOOD PRESSURE: 70 MMHG | WEIGHT: 190.9 LBS | OXYGEN SATURATION: 98 % | TEMPERATURE: 97 F

## 2022-11-17 DIAGNOSIS — K62.9 PERIANAL LESION: ICD-10-CM

## 2022-11-17 DIAGNOSIS — R68.82 DECREASED LIBIDO: Primary | ICD-10-CM

## 2022-11-17 DIAGNOSIS — M54.50 CHRONIC LOW BACK PAIN, UNSPECIFIED BACK PAIN LATERALITY, UNSPECIFIED WHETHER SCIATICA PRESENT: ICD-10-CM

## 2022-11-17 DIAGNOSIS — N52.9 ERECTILE DYSFUNCTION, UNSPECIFIED ERECTILE DYSFUNCTION TYPE: ICD-10-CM

## 2022-11-17 DIAGNOSIS — G89.29 CHRONIC LOW BACK PAIN, UNSPECIFIED BACK PAIN LATERALITY, UNSPECIFIED WHETHER SCIATICA PRESENT: ICD-10-CM

## 2022-11-17 DIAGNOSIS — M65.30 TRIGGER FINGER, ACQUIRED: ICD-10-CM

## 2022-11-17 DIAGNOSIS — J45.30 MILD PERSISTENT ASTHMA WITHOUT COMPLICATION: ICD-10-CM

## 2022-11-17 PROCEDURE — 99214 OFFICE O/P EST MOD 30 MIN: CPT | Performed by: INTERNAL MEDICINE

## 2022-11-17 RX ORDER — SILDENAFIL 50 MG/1
50-100 TABLET, FILM COATED ORAL DAILY PRN
Qty: 60 TABLET | Refills: 6 | Status: SHIPPED | OUTPATIENT
Start: 2022-11-17 | End: 2022-11-17

## 2022-11-17 RX ORDER — SILDENAFIL 100 MG/1
TABLET, FILM COATED ORAL
Qty: 12 TABLET | Refills: 0 | Status: CANCELLED | OUTPATIENT
Start: 2022-11-17

## 2022-11-17 RX ORDER — SILDENAFIL 50 MG/1
50-100 TABLET, FILM COATED ORAL DAILY PRN
Qty: 60 TABLET | Refills: 6 | Status: SHIPPED | OUTPATIENT
Start: 2022-11-17 | End: 2023-09-01

## 2022-11-17 RX ORDER — GABAPENTIN 300 MG/1
CAPSULE ORAL
Qty: 270 CAPSULE | Refills: 1 | Status: SHIPPED | OUTPATIENT
Start: 2022-11-17 | End: 2023-11-07

## 2022-11-17 ASSESSMENT — PAIN SCALES - GENERAL: PAINLEVEL: NO PAIN (0)

## 2022-11-17 ASSESSMENT — ASTHMA QUESTIONNAIRES
QUESTION_1 LAST FOUR WEEKS HOW MUCH OF THE TIME DID YOUR ASTHMA KEEP YOU FROM GETTING AS MUCH DONE AT WORK, SCHOOL OR AT HOME: NONE OF THE TIME
ACT_TOTALSCORE: 25
QUESTION_5 LAST FOUR WEEKS HOW WOULD YOU RATE YOUR ASTHMA CONTROL: COMPLETELY CONTROLLED
QUESTION_2 LAST FOUR WEEKS HOW OFTEN HAVE YOU HAD SHORTNESS OF BREATH: NOT AT ALL
QUESTION_4 LAST FOUR WEEKS HOW OFTEN HAVE YOU USED YOUR RESCUE INHALER OR NEBULIZER MEDICATION (SUCH AS ALBUTEROL): NOT AT ALL
ACT_TOTALSCORE: 25
QUESTION_3 LAST FOUR WEEKS HOW OFTEN DID YOUR ASTHMA SYMPTOMS (WHEEZING, COUGHING, SHORTNESS OF BREATH, CHEST TIGHTNESS OR PAIN) WAKE YOU UP AT NIGHT OR EARLIER THAN USUAL IN THE MORNING: NOT AT ALL

## 2022-11-17 NOTE — PROGRESS NOTES
Assessment & Plan     (R68.82) Decreased libido  (primary encounter diagnosis)  (N52.9) Erectile dysfunction, unspecified erectile dysfunction type  Comment:   Return for screening testosterone.  Resume sildenafil-recommended good Rx justin to reduce cost.  Plan: Testosterone Free and Total, sildenafil         (VIAGRA) 50 MG tablet    (J45.30) Mild persistent asthma without complication  Comment:   Plan: Well-controlled, continue ICS/albuterol as needed for flares.    (M54.50,  G89.29) Chronic low back pain, unspecified back pain laterality, unspecified whether sciatica present  Comment: Symptoms well controlled continue gabapentin.  Encouraged home PT stretches  Plan: gabapentin (NEURONTIN) 300 MG capsule          (M65.30) Trigger finger, acquired  Comment:   Trigger finger/diagnosis discussed.  recommended conservative treatment-hand PT referral/ discussed ortho-hand referral if not improving  Plan: Occupational Therapy Referral          (K62.9) Perianal lesion  Comment: benign characteristics, ?verrucae vs nevus vs papilloma.  Referral for excision  Plan: Adult Dermatology Referral           Return in about 1 year (around 11/17/2023) for Annual preventative visit.    Daniel Bauer MD  [facility] GUICHO Doss is a 47 year old, presenting for the following health issues:  Recheck Medication      History of Present Illness       Reason for visit:  Check testosterone  and reup meds    He eats 0-1 servings of fruits and vegetables daily.He consumes 0 sweetened beverage(s) daily.He exercises with enough effort to increase his heart rate 9 or less minutes per day.  He exercises with enough effort to increase his heart rate 3 or less days per week.   He is taking medications regularly.        Decreased libido  Erectile dysfunction, unspecified erectile dysfunction type     Decreased libido, ED.  Previously used topical testosterone requesting follow-up testosterone levels.    Mild persistent  asthma without complication      Using Advair and albuterol as needed for exacerbations which are infrequent.  Does not use Advair between episodes.    Chronic low back pain, unspecified back pain laterality, unspecified whether sciatica present      Chronic low back pain-did go to physical therapy, does home therapy exercises intermittently.  Continues gabapentin-symptoms well controlled.    Trigger finger, acquired      Digit tends to get stuck in flexed position intermittently/associated palmar contraction.    Perianal lesion     Perianal skin lesion present for the past few years- has not changed, would like it removed.    Patient Active Problem List   Diagnosis     CARDIOVASCULAR SCREENING; LDL GOAL LESS THAN 160     Seborrheic dermatitis     Mild persistent asthma without complication     Chronic low back pain, unspecified back pain laterality, unspecified whether sciatica present     Chronic pain in testicle     Decreased libido     Current Outpatient Medications   Medication Sig Dispense Refill     albuterol (PROAIR HFA/PROVENTIL HFA/VENTOLIN HFA) 108 (90 Base) MCG/ACT inhaler Inhale 2 puffs into the lungs every 4 hours as needed for shortness of breath / dyspnea 3 Inhaler 3     fluticasone-salmeterol (ADVAIR) 250-50 MCG/DOSE inhaler Inhale 1 puff into the lungs 2 times daily 3 Inhaler 3     gabapentin (NEURONTIN) 300 MG capsule TAKE ONE CAPSULE BY MOUTH THREE TIMES DAILY AS NEEDED 270 capsule 1     ibuprofen (ADVIL/MOTRIN) 200 MG tablet Take 800 mg by mouth every 4 hours as needed for mild pain         Review of Systems         Objective    /70 (BP Location: Right arm, Patient Position: Sitting, Cuff Size: Adult Regular)   Pulse 71   Temp 97  F (36.1  C) (Tympanic)   Resp 16   Wt 86.6 kg (190 lb 14.4 oz)   SpO2 98%   BMI 26.14 kg/m    Body mass index is 26.14 kg/m .  Physical Exam   Perianal exam: flesh colored exophytic perianal lesion approx 7mm, well circumscribed

## 2022-11-22 ENCOUNTER — LAB (OUTPATIENT)
Dept: LAB | Facility: CLINIC | Age: 47
End: 2022-11-22
Payer: COMMERCIAL

## 2022-11-22 DIAGNOSIS — N52.9 ERECTILE DYSFUNCTION, UNSPECIFIED ERECTILE DYSFUNCTION TYPE: ICD-10-CM

## 2022-11-22 PROCEDURE — 84403 ASSAY OF TOTAL TESTOSTERONE: CPT

## 2022-11-22 PROCEDURE — 36415 COLL VENOUS BLD VENIPUNCTURE: CPT

## 2022-11-22 PROCEDURE — 84270 ASSAY OF SEX HORMONE GLOBUL: CPT

## 2022-11-23 LAB — SHBG SERPL-SCNC: 41 NMOL/L (ref 11–80)

## 2022-11-28 LAB
TESTOST FREE SERPL-MCNC: 6.9 NG/DL
TESTOST SERPL-MCNC: 390 NG/DL (ref 240–950)

## 2022-12-14 NOTE — PROGRESS NOTES
Hand Therapy Initial Evaluation    Current Date:  12/15/2022  Referring Provider: Daniel Bauer MD MD Order Date: 11/17/2022    Diagnosis: Trigger finger (right long finger)  DOI: about 2 years ago    Subjective:  Romario Lizarraga is a 47 year old male.    Patient reports symptoms of the right long finger which occurred due to use overtime. Since onset symptoms are Gradually getting worse.  General health as reported by patient is good.  Pertinent medical history includes:Asthma, Numbness/Tingling, Overweight  Medical allergies:none.  Surgical history: other: nasal, finger, appendix.  Medication history: Pain.    Current occupation is [employer]  Job Tasks: Computer Work, Lifting, Carrying, Pushing, Pulling    Occupational Profile Information:  Right hand dominant  Prior functional level:  independent-shared household chores  Patient reports symptoms of pain, stiffness/loss of motion and edema  Special tests:  none.    Previous treatment: medication, warmth from shower  Transportation: drives  Currently working in normal job without restrictions  Leisure activities/hobbies: golfing, 10 and 12 year old children    Functional Outcome Measure:   Upper Extremity Functional Index Score:  SCORE:   Column Totals: /80: 76   (A lower score indicates greater disability.)    Objective  Pain Level (Scale 0-10)   12/15/2022   At Rest 1-2/10   With Use 5/10     Pain Description  Date 12/15/2022   Location long finger A1 pulley   Pain Quality Aching, Dull, Sharp and Shooting   Frequency constant     Pain is worst daytime   Exacerbated by gripping   Relieved by heat and otc medications   Progression Gradually worsening     Sensation   WNL throughout all nerve distributions; per patient report    Edema (Circumference measured in cm)   12/15/2022 12/15/2022   Long Finger L R   P1 6.6 7.0   PIP 6.5 6.5   P2 5.7 5.8     ROM  Long Finger 12/15/2022 12/15/2022   AROM (PROM) L R   MCP 0/93 0/85   PIP 0/100 0/93   DIP 0/89 0/79   ALEXANDER  282 257     Stage of Stenosing Tenosynovitis (SST)     12/15/2022   Long Finger 3   Stage 1:  Normal  Stage 2:  Uneven motion of tendon  Stage 3:  Triggering, clicking, catching  Stage 4:  Locking in extension or flexion; unlocked by active motion  Stage 5:  Locking in extension or flexion; unlocked by passive motion  Stage 6:  Finger locked in extension or flexion    Strength   (Measured in pounds)  Pain Report:  - none  + mild    ++ moderate    +++ severe    12/15/2022 12/15/2022   Trials L R   1  2  3 101 106   Average 101 106     Lat Pinch 12/15/2022 12/15/2022   Trials L R   1  2  3 19 17   Average 19 17     3 Pt Pinch 12/15/2022 12/15/2022   Trials L R   1  2  3 11 14   Average 11 14     Palpation  Pain Report:  - none  + mild    ++ moderate    +++ severe   Long Finger 12/15/2022   A1 Pulley +++   A3 Pulley/PIP -   CMC +   FA Flexors -   FA Extensors -     Assessment:  Patient presents with symptoms consistent with diagnosis of right long finger trigger, with conservative intervention.     Patient's limitations or Problem List includes:  Pain, Decreased ROM/motion, Increased edema, Weakness, Decreased  and Tightness in musculature of the right long finger which interferes with the patient's ability to perform Work Tasks, Sleep Patterns, Household Chores and gripping tasks as compared to previous level of function.    Rehab Potential:  Good - Return to full activity, some limitations    Patient will benefit from skilled Occupational Therapy to increase ROM, flexibility, motion, overall strength and  strength and decrease pain and edema to return to previous activity level and resume normal daily tasks and to reach their rehab potential.    Barriers to Learning:  No barrier    Communication Issues:  Patient appears to be able to clearly communicate and understand verbal and written communication and follow directions correctly.    Chart Review: Chart Review and Simple history review with  patient    Identified Performance Deficits: sleep, work, leisure activities and gripping tasks    Assessment of Occupational Performance:  3-5 Performance Deficits    Clinical Decision Making (Complexity): Low complexity    Treatment Explanation:  The following has been discussed with the patient:  RX ordered/plan of care  Anticipated outcomes  Possible risks and side effects    Plan:  Frequency:  1 X week, once daily  Duration:  for 6 weeks    Treatment Plan:    Modalities:    US and Paraffin   Therapeutic Exercise:    AROM, AAROM, PROM, Tendon Gliding, Blocking and Reverse Blocking  Neuromuscular re-ed:   Nerve Gliding and Kinesiotaping  Manual Techniques:   Joint mobilization, Friction massage, Myofascial release and Manual edema mobilization  Orthotic Fabrication:    Static  Self Care:    Self Care Tasks, Ergonomic Considerations and Work Tasks    Discharge Plan:  Achieve all LTG.  Independent in home treatment program.  Reach maximal therapeutic benefit.    Home Exercise Program:  Trigger Finger  Orthosis Wear and Care  Ball Massage  Finger Active Range of Motion Tendon Glides Fist Series  Finger Passive Range of Motion Composite Flexion    Next Visit:  Review HEP  Review orthosis fit  US  MFR  A/PROM

## 2022-12-15 ENCOUNTER — THERAPY VISIT (OUTPATIENT)
Dept: OCCUPATIONAL THERAPY | Facility: CLINIC | Age: 47
End: 2022-12-15
Attending: INTERNAL MEDICINE
Payer: COMMERCIAL

## 2022-12-15 DIAGNOSIS — M79.644 PAIN OF FINGER OF RIGHT HAND: ICD-10-CM

## 2022-12-15 DIAGNOSIS — M65.30 TRIGGER FINGER, ACQUIRED: ICD-10-CM

## 2022-12-15 PROCEDURE — 97165 OT EVAL LOW COMPLEX 30 MIN: CPT | Mod: GO

## 2022-12-15 PROCEDURE — 97110 THERAPEUTIC EXERCISES: CPT | Mod: GO

## 2022-12-15 PROCEDURE — 97760 ORTHOTIC MGMT&TRAING 1ST ENC: CPT | Mod: GO

## 2022-12-15 PROCEDURE — 97535 SELF CARE MNGMENT TRAINING: CPT | Mod: GO

## 2022-12-17 NOTE — PROGRESS NOTES
Diagnosis: trigger long finger (~2 years ago)       ---  Treat  - check splint fit  - MFR A1 pulley   - US (check precautions)   - PROM/AROM active hook fist (2-3x/day 15 reps)   - joint protection (avoid repetitive gripping)

## 2022-12-21 ENCOUNTER — THERAPY VISIT (OUTPATIENT)
Dept: OCCUPATIONAL THERAPY | Facility: CLINIC | Age: 47
End: 2022-12-21
Payer: COMMERCIAL

## 2022-12-21 DIAGNOSIS — M65.30 TRIGGER FINGER, ACQUIRED: Primary | ICD-10-CM

## 2022-12-21 DIAGNOSIS — M79.644 PAIN OF FINGER OF RIGHT HAND: ICD-10-CM

## 2022-12-21 PROCEDURE — 97140 MANUAL THERAPY 1/> REGIONS: CPT | Mod: GO

## 2022-12-21 PROCEDURE — 97763 ORTHC/PROSTC MGMT SBSQ ENC: CPT | Mod: GO

## 2022-12-23 NOTE — PROGRESS NOTES
**SOAP DUE       Treat   - assess ring splint   - STM to A1 pulley   - Ultrasound   - Passive hook fist     KT tape   Locking episode

## 2022-12-27 ENCOUNTER — THERAPY VISIT (OUTPATIENT)
Dept: OCCUPATIONAL THERAPY | Facility: CLINIC | Age: 47
End: 2022-12-27
Payer: COMMERCIAL

## 2022-12-27 DIAGNOSIS — M79.644 PAIN OF FINGER OF RIGHT HAND: ICD-10-CM

## 2022-12-27 DIAGNOSIS — M65.30 TRIGGER FINGER, ACQUIRED: Primary | ICD-10-CM

## 2022-12-27 PROCEDURE — 97140 MANUAL THERAPY 1/> REGIONS: CPT | Mod: GO

## 2022-12-27 PROCEDURE — 97763 ORTHC/PROSTC MGMT SBSQ ENC: CPT | Mod: GO

## 2023-01-06 NOTE — PROGRESS NOTES
**SOAP DUE       Treat   - goal (cutting foods, target 3/10 pain)   - STM   - US   - KT tape   - ICE         - isometric strengthening of FDP (45 degree angle); towel

## 2023-01-11 ENCOUNTER — THERAPY VISIT (OUTPATIENT)
Dept: OCCUPATIONAL THERAPY | Facility: CLINIC | Age: 48
End: 2023-01-11
Payer: COMMERCIAL

## 2023-01-11 DIAGNOSIS — M79.644 PAIN OF FINGER OF RIGHT HAND: ICD-10-CM

## 2023-01-11 DIAGNOSIS — M65.30 TRIGGER FINGER, ACQUIRED: Primary | ICD-10-CM

## 2023-01-11 PROCEDURE — 97112 NEUROMUSCULAR REEDUCATION: CPT | Mod: GO

## 2023-01-11 PROCEDURE — 97763 ORTHC/PROSTC MGMT SBSQ ENC: CPT | Mod: GO

## 2023-01-27 NOTE — PROGRESS NOTES
Hand Therapy Progress Note    Current Date:  2/1/2023    Diagnosis: Trigger finger (right long finger)  DOI: about 2 years ago     Reporting period is 12/15/22 to 2/1/2023    Subjective:   Subjective changes noted by patient:  'still feels tender at the base of middle finger; feels tight on the sides of finger (referring to mostly at PIPj)   Functional changes noted by patient:  Improvement in Work Tasks and Household Chores (opening doors)  No Change to Sleep Patterns and Recreational Activities  Decreased Performance in Household Chores (opening jars, pushing up on your hands, cleaning)  Patient has noted adverse reaction to:  None    Functional Outcome Measure:  Upper Extremity Functional Index Score:  SCORE:   Column Totals: /80: 73   (A lower score indicates greater disability.)    Objective  Pain Level (Scale 0-10)    12/15/2022 2/1/23   At Rest 1-2/10 0/10    With Use 5/10 1-2/10       Pain Description  Date 12/15/2022 2/1/23   Location long finger A1 pulley R LF A1 pulley, PIPj    Pain Quality Aching, Dull, Sharp and Shooting Aching, sharp    Frequency constant   Intermittent    Pain is worst daytime Daytime or nighttime    Exacerbated by gripping Use, opening    Relieved by heat and otc medications Heat, rest, brace   Progression Gradually worsening Gradually getting better       Sensation   WNL throughout all nerve distributions; per patient report     Edema (Circumference measured in cm)    12/15/2022 12/15/2022   Long Finger L R   P1 6.6 7.0   PIP 6.5 6.5   P2 5.7 5.8      ROM  Long Finger 12/15/2022 12/15/2022   AROM (PROM) L R   MCP 0/93 0/85   PIP 0/100 0/93   DIP 0/89 0/79   ALEXANDER 282 257      Stage of Stenosing Tenosynovitis (SST)       12/15/2022 2/1/23   Long Finger 3 3   Stage 1:  Normal  Stage 2:  Uneven motion of tendon  Stage 3:  Triggering, clicking, catching  Stage 4:  Locking in extension or flexion; unlocked by active motion  Stage 5:  Locking in extension or flexion; unlocked by passive  motion  Stage 6:  Finger locked in extension or flexion     Strength   (Measured in pounds)  Pain Report:  - none  + mild    ++ moderate    +++ severe    12/15/2022 12/15/2022   Trials L R   1  2  3 101 106   Average 101 106      Lat Pinch 12/15/2022 12/15/2022   Trials L R   1  2  3 19 17   Average 19 17      3 Pt Pinch 12/15/2022 12/15/2022   Trials L R   1  2  3 11 14   Average 11 14      Palpation  Pain Report:  - none  + mild    ++ moderate    +++ severe   Long Finger 12/15/2022 2/1/23   A1 Pulley +++ +   A3 Pulley/PIP - +   CMC + -   FA Flexors - -   FA Extensors - -     Assessment:   Response to therapy has been improvement to:  Pain:  frequency is less and intensity of pain is decreased  Response to therapy has been lack of progress in:  Palpable nodule noted at the base of R MF (A1 pulley)    Overall Assessment:  Patient's symptoms are resolving (pain)  Patient is ready to progress to more complex exercises.  No change of symptoms has been noted (palpable nodule at the base of R MF)   Patient would benefit from continued therapy to achieve rehab potential  STG/LTG:  STGoals have been reviewed and progress or achievement has occurred;  see goal sheet for details and updates.    Plan:  Frequency/Duration:  Recommend continuing with the current treatment plan. 1 X week, once daily  for 6 weeks  Appropriateness of Rx I have re-evaluated this patient and find that the nature, scope, duration and intensity of the therapy is appropriate for the medical condition of the patient.  Recommendations for Continued Therapy    Treatment Plan:    Modalities:    US and Paraffin   Therapeutic Exercise:    AROM, AAROM, PROM, Tendon Gliding, Blocking and Reverse Blocking  Neuromuscular re-ed:              Nerve Gliding and Kinesiotaping  Manual Techniques:              Joint mobilization, Friction massage, Myofascial release and Manual edema mobilization  Orthotic Fabrication:    Static  Self Care:    Self Care Tasks,  Ergonomic Considerations and Work Tasks     Discharge Plan:  Achieve all LTG.  Independent in home treatment program.  Reach maximal therapeutic benefit.    Home Exercise Program:  Trigger Finger  Orthosis Wear and Care  Ball Massage  Finger Active Range of Motion Tendon Glides Fist Series  Finger Passive Range of Motion Composite Flexion    Next Visit:  Reassess pain  Wean off from brace   STM of A1 pulley   Tendon glides   US          Initial UA on presentation noted to have + mod leuk esterase and WBC 25-50, but no urine cx sent. Patient empirically started on macrobid 100 mg bid for suspected UTI. Repeat UA/urine cx negative  - will discontinue macrobid

## 2023-02-01 ENCOUNTER — THERAPY VISIT (OUTPATIENT)
Dept: OCCUPATIONAL THERAPY | Facility: CLINIC | Age: 48
End: 2023-02-01
Payer: COMMERCIAL

## 2023-02-01 DIAGNOSIS — M65.30 TRIGGER FINGER, ACQUIRED: Primary | ICD-10-CM

## 2023-02-01 DIAGNOSIS — M79.644 PAIN OF FINGER OF RIGHT HAND: ICD-10-CM

## 2023-02-01 PROCEDURE — 97110 THERAPEUTIC EXERCISES: CPT | Mod: GO

## 2023-02-01 PROCEDURE — 97140 MANUAL THERAPY 1/> REGIONS: CPT | Mod: GO

## 2023-02-24 NOTE — PROGRESS NOTES
**SOAP DUE       Treat   - pain (target: 1/10 pain)   - cont splint if symptoms persist   - STM to A 1 pulley   - Full fist tendon glides   - US

## 2023-02-28 ENCOUNTER — OFFICE VISIT (OUTPATIENT)
Dept: DERMATOLOGY | Facility: CLINIC | Age: 48
End: 2023-02-28
Attending: INTERNAL MEDICINE
Payer: COMMERCIAL

## 2023-02-28 DIAGNOSIS — D49.2 SKIN NEOPLASM: ICD-10-CM

## 2023-02-28 DIAGNOSIS — K62.9 PERIANAL LESION: ICD-10-CM

## 2023-02-28 PROCEDURE — 88305 TISSUE EXAM BY PATHOLOGIST: CPT | Performed by: DERMATOLOGY

## 2023-02-28 PROCEDURE — 11102 TANGNTL BX SKIN SINGLE LES: CPT | Performed by: DERMATOLOGY

## 2023-02-28 RX ORDER — LIDOCAINE HYDROCHLORIDE AND EPINEPHRINE 10; 10 MG/ML; UG/ML
3 INJECTION, SOLUTION INFILTRATION; PERINEURAL ONCE
Status: ACTIVE | OUTPATIENT
Start: 2023-02-28

## 2023-02-28 NOTE — PATIENT INSTRUCTIONS
Pediatric Dermatology  [facility]  [address]  1st [place]  [address]  Phone: [phone]    Pediatric & Adult Dermatology  [facility]  [address] [facility] [address]   2nd Floor  [facility] [address]  [phone]                  General information: Dr. Sandie Ríos is a board-certified dermatologist with subspecialty certification in pediatric dermatology.     Scheduling and Nurse Triage: Dr. Ríos sees pediatric patients on Mondays in [place] and adult and pediatric patients on Tuesdays in [place]. The remainder of the week she practices at the [facility]. Please call the above phone numbers to schedule or to talk to a nurse.     -For scheduling at the [place] or [place] locations, or to talk to the triage nurse please call the above phone number at the clinic where you were seen.     -For medication refills, please call your pharmacy.

## 2023-02-28 NOTE — LETTER
2/28/2023      RE: Romario Lizarraga  [address]  [facility] [address]     Dear Colleague,    Thank you for the opportunity to participate in the care of your patient, Romario Lizarraga, at the [facility] at . Please see a copy of my visit note below.    Dermatology Clinic Note    Dermatology Problem List:  1. Benign pigmented nevi   2. Intertrigo  3. Dyshidrotic dermatitis  4. Folliculitis of the shoulders  5. 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 Benign pigmented nevi: Reviewed photos from last phone visit and nevi are unchanged. New photos today.  Sun protection recommended. Discussed ABCDEs of malignant melanoma.     2. Perirectal papule: DDx of verrucous keratosis, verruca, EIC. Shave biopsy today. No photo based on patient request.     Thank you for involving me in this patient's care.     RTC in 1 year, sooner prn.     Sandie Ríos MD  Dermatology Staff    CC:     Daniel Bauer MD, MD  ____________________________________________________________________________________________________________________________________________    CC: Patient presents with:  RECHECK: Mass in rectal area start 3 years, in last 3 months it has gotten smaller, no acute pain tx none looking for tx options     HPI: Romario Lizarraga is a 47 year old male presenting for recheck of perirectal papule. I had seen patient last in 9/2020. Since that time the lesion size has increased and decreased but not resolved. No significant drainage. No other lesions.     Patient Active Problem List   Diagnosis     CARDIOVASCULAR SCREENING; LDL GOAL LESS THAN 160     Seborrheic dermatitis     Mild persistent asthma without complication     Chronic low back pain, unspecified back pain laterality, unspecified whether sciatica present     Chronic pain in testicle     Decreased libido     Trigger finger, acquired      Pain of finger of right hand       No Known Allergies      Current Outpatient Medications   Medication     albuterol (PROAIR HFA/PROVENTIL HFA/VENTOLIN HFA) 108 (90 Base) MCG/ACT inhaler     fluticasone-salmeterol (ADVAIR) 250-50 MCG/DOSE inhaler     gabapentin (NEURONTIN) 300 MG capsule     ibuprofen (ADVIL/MOTRIN) 200 MG tablet     sildenafil (VIAGRA) 50 MG tablet     No current facility-administered medications for this visit.       Family History   Problem Relation Age of Onset     Hypertension Father      Cancer Maternal Grandmother         lung     Heart Disease Maternal Grandfather         copd     Cancer - colorectal Paternal Grandmother      Heart Disease Paternal Grandfather      Cancer Maternal Uncle 50        prostate     Cancer Paternal Aunt         leukemia     Breast Cancer Paternal Aunt      Diabetes Paternal Uncle      Breast Cancer Other        Social History     Tobacco Use     Smoking status: Former     Packs/day: 0.00     Years: 0.00     Pack years: 0.00     Types: Cigarettes     Smokeless tobacco: Never     Tobacco comments:     I quit 20 years ago   Vaping Use     Vaping Use: Never used   Substance Use Topics     Alcohol use: Yes     Comment: occ     Drug use: No   . Has 2 daughters. Wife is ED MD.       ROS: Patient in normal state of health and is feeling well on complete ROS. No fevers, cough, rash, GI upset, headaches, rhinorrhea, or other rashes.    EXAM:  PHYSICAL EXAM:  There were no vitals taken for this visit.  Gen: Alert. No distress    Exam of the face, neck, chest, abdomen, back, arms, legs, hands, feet, buttocks. Normal except as follows:  Scattered medium and dark brown 3-4 mm macules on the upper and lower back, thighs, forearms  R abdomen with tan approx 6 mm macule periphery  L medial thigh with 5 mm medium and light brown macule  Xerotic scaling on the plantar feet bilaterally  Approx 3 mm tan papule at the 9 o'clock position of the R perirectal buttock approx 1 cm  from anal verge  Dark brown oval approx 8 mm macule on the R posterior shoulder and 3 mm dark brown macule on the central R back      Shave biopsy:  After discussion of benefits and risks including but not limited to bleeding/bruising, pain/swelling, infection, scar, incomplete removal, nerve damage/numbness, recurrence, and non-diagnostic biopsy, written consent, verbal consent and photographs were obtained. Time-out was performed. The area was cleaned with isopropyl alcohol. 1 mL of 1% lidocaine with epinephrine was injected to obtain adequate anesthesia of the lesion on the R perirectal buttock. A shave biopsy was performed. Hemostasis was achieved with aluminium chloride. Vaseline and a sterile dressing were applied. The patient tolerated the procedure and no complications were noted. The patient was provided with verbal and written post care instructions.

## 2023-02-28 NOTE — PROGRESS NOTES
Dermatology Clinic Note    Dermatology Problem List:  1. Benign pigmented nevi   2. Intertrigo  3. Dyshidrotic dermatitis  4. Folliculitis of the shoulders  5. 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 Benign pigmented nevi: Reviewed photos from last phone visit and nevi are unchanged. New photos today.  Sun protection recommended. Discussed ABCDEs of malignant melanoma.     2. Perirectal papule: DDx of verrucous keratosis, verruca, EIC. Shave biopsy today. No photo based on patient request.     Thank you for involving me in this patient's care.     RTC in 1 year, sooner prn.     Sandie Ríos MD  Dermatology Staff    CC:     Daniel Bauer MD, MD  ____________________________________________________________________________________________________________________________________________    CC: Patient presents with:  RECHECK: Mass in rectal area start 3 years, in last 3 months it has gotten smaller, no acute pain tx none looking for tx options     HPI: Romario Lizarraga is a 47 year old male presenting for recheck of perirectal papule. I had seen patient last in 9/2020. Since that time the lesion size has increased and decreased but not resolved. No significant drainage. No other lesions.     Patient Active Problem List   Diagnosis     CARDIOVASCULAR SCREENING; LDL GOAL LESS THAN 160     Seborrheic dermatitis     Mild persistent asthma without complication     Chronic low back pain, unspecified back pain laterality, unspecified whether sciatica present     Chronic pain in testicle     Decreased libido     Trigger finger, acquired     Pain of finger of right hand       No Known Allergies      Current Outpatient Medications   Medication     albuterol (PROAIR HFA/PROVENTIL HFA/VENTOLIN HFA) 108 (90 Base) MCG/ACT inhaler     fluticasone-salmeterol (ADVAIR) 250-50 MCG/DOSE inhaler     gabapentin (NEURONTIN) 300 MG capsule     ibuprofen (ADVIL/MOTRIN) 200 MG tablet     sildenafil  (VIAGRA) 50 MG tablet     No current facility-administered medications for this visit.       Family History   Problem Relation Age of Onset     Hypertension Father      Cancer Maternal Grandmother         lung     Heart Disease Maternal Grandfather         copd     Cancer - colorectal Paternal Grandmother      Heart Disease Paternal Grandfather      Cancer Maternal Uncle 50        prostate     Cancer Paternal Aunt         leukemia     Breast Cancer Paternal Aunt      Diabetes Paternal Uncle      Breast Cancer Other        Social History     Tobacco Use     Smoking status: Former     Packs/day: 0.00     Years: 0.00     Pack years: 0.00     Types: Cigarettes     Smokeless tobacco: Never     Tobacco comments:     I quit 20 years ago   Vaping Use     Vaping Use: Never used   Substance Use Topics     Alcohol use: Yes     Comment: occ     Drug use: No   . Has 2 daughters. Wife is ED MD.       ROS: Patient in normal state of health and is feeling well on complete ROS. No fevers, cough, rash, GI upset, headaches, rhinorrhea, or other rashes.    EXAM:  PHYSICAL EXAM:  There were no vitals taken for this visit.  Gen: Alert. No distress    Exam of the face, neck, chest, abdomen, back, arms, legs, hands, feet, buttocks. Normal except as follows:  Scattered medium and dark brown 3-4 mm macules on the upper and lower back, thighs, forearms  R abdomen with tan approx 6 mm macule periphery  L medial thigh with 5 mm medium and light brown macule  Xerotic scaling on the plantar feet bilaterally  Approx 3 mm tan papule at the 9 o'clock position of the R perirectal buttock approx 1 cm from anal verge  Dark brown oval approx 8 mm macule on the R posterior shoulder and 3 mm dark brown macule on the central R back      Shave biopsy:  After discussion of benefits and risks including but not limited to bleeding/bruising, pain/swelling, infection, scar, incomplete removal, nerve damage/numbness, recurrence, and non-diagnostic biopsy,  written consent, verbal consent and photographs were obtained. Time-out was performed. The area was cleaned with isopropyl alcohol. 1 mL of 1% lidocaine with epinephrine was injected to obtain adequate anesthesia of the lesion on the R perirectal buttock. A shave biopsy was performed. Hemostasis was achieved with aluminium chloride. Vaseline and a sterile dressing were applied. The patient tolerated the procedure and no complications were noted. The patient was provided with verbal and written post care instructions.

## 2023-03-01 ENCOUNTER — THERAPY VISIT (OUTPATIENT)
Dept: OCCUPATIONAL THERAPY | Facility: CLINIC | Age: 48
End: 2023-03-01
Payer: COMMERCIAL

## 2023-03-01 DIAGNOSIS — M65.30 TRIGGER FINGER, ACQUIRED: Primary | ICD-10-CM

## 2023-03-01 DIAGNOSIS — M79.644 PAIN OF FINGER OF RIGHT HAND: ICD-10-CM

## 2023-03-01 PROCEDURE — 97140 MANUAL THERAPY 1/> REGIONS: CPT | Mod: GO

## 2023-03-01 PROCEDURE — 97035 APP MDLTY 1+ULTRASOUND EA 15: CPT | Mod: GO

## 2023-03-02 LAB
PATH REPORT.COMMENTS IMP SPEC: NORMAL
PATH REPORT.COMMENTS IMP SPEC: NORMAL
PATH REPORT.FINAL DX SPEC: NORMAL
PATH REPORT.GROSS SPEC: NORMAL
PATH REPORT.MICROSCOPIC SPEC OTHER STN: NORMAL
PATH REPORT.RELEVANT HX SPEC: NORMAL

## 2023-03-03 DIAGNOSIS — B07.8 OTHER VIRAL WARTS: Primary | ICD-10-CM

## 2023-03-03 RX ORDER — IMIQUIMOD 12.5 MG/.25G
CREAM TOPICAL
Qty: 12 PACKET | Refills: 3 | Status: SHIPPED | OUTPATIENT
Start: 2023-03-03 | End: 2023-12-19

## 2023-03-26 ENCOUNTER — HEALTH MAINTENANCE LETTER (OUTPATIENT)
Age: 48
End: 2023-03-26

## 2023-04-13 NOTE — PROGRESS NOTES
Discharge Note - Hand Therapy    Current Date:  4/19/2023    Diagnosis: Trigger finger (right long finger)  DOI: about 2 years ago     Reporting period is 2/1/2023 to 4/19/23     Subjective:   Subjective changes as noted by patient:  'feels baseline, not worse or better'   Functional changes noted by patient:  Improvement in Sleep Patterns and Household Chores  No Change to Recreational Activities  Patient has noted adverse reaction to:  None       Objective  Pain Level (Scale 0-10)    12/15/2022 2/1/23 4/19/23   At Rest 1-2/10 0/10  0/10    With Use 5/10 1-2/10  1-2/10       Pain Description  Date 12/15/2022 2/1/23 4/19/23   Location long finger A1 pulley R LF A1 pulley, PIPj  R LF A1 pulley; PIPj    Pain Quality Aching, Dull, Sharp and Shooting Aching, sharp  Aching    Frequency constant   Intermittent  Intermittent    Pain is worst daytime Daytime or nighttime  Daytime    Exacerbated by gripping Use, opening  With activities    Relieved by heat and otc medications Heat, rest, brace Rest, splint    Progression Gradually worsening Gradually getting better  Unchanged       Sensation   WNL throughout all nerve distributions; per patient report     Edema (Circumference measured in cm)    12/15/2022 12/15/2022   Long Finger L R   P1 6.6 7.0   PIP 6.5 6.5   P2 5.7 5.8      ROM  Long Finger 12/15/2022 12/15/2022 4/19/23   AROM (PROM) L R R   MCP 0/93 0/85 /83   PIP 0/100 0/93 /94   DIP 0/89 0/79 /78   ALEXANDER 282 257 255      Stage of Stenosing Tenosynovitis (SST)       12/15/2022 2/1/23 4/19/23   Long Finger 3 3 2   Stage 1:  Normal  Stage 2:  Uneven motion of tendon  Stage 3:  Triggering, clicking, catching  Stage 4:  Locking in extension or flexion; unlocked by active motion  Stage 5:  Locking in extension or flexion; unlocked by passive motion  Stage 6:  Finger locked in extension or flexion     Strength   (Measured in pounds)  Pain Report:  - none  + mild    ++ moderate    +++ severe    12/15/2022 12/15/2022    Trials L R   1  2  3 101 106   Average 101 106      Lat Pinch 12/15/2022 12/15/2022   Trials L R   1  2  3 19 17   Average 19 17      3 Pt Pinch 12/15/2022 12/15/2022   Trials L R   1  2  3 11 14   Average 11 14      Palpation  Pain Report:  - none  + mild    ++ moderate    +++ severe   Long Finger 12/15/2022 2/1/23 4/19/23   A1 Pulley +++ + +   A3 Pulley/PIP - + +   CMC + - -   FA Flexors - - -   FA Extensors - - -     Please refer to the daily flowsheet for treatment today, total treatment time and time spent performing 1:1 timed codes.        Assessment:  Response to therapy has been improvement to:  Pain:  frequency is less and intensity of pain is decreased  Response to therapy has been lack of progress in:  ROM of Fingers: PIP joint - Flex, DIP joint - Flex  Patient's report of 'tightness' along A1 pulley and PIPj of R LF  Appropriateness of Rx I have re-evaluated this patient and find that the nature, scope, duration and intensity of the therapy is appropriate for the medical condition of the patient.  Overall Assessment:  Patient's progress has plateaued.  Patient is ready to be discharged from therapy and continue their home treatment program.  Patient would benefit from further evaluation of:  Potential surgical intervention   STG/LTG:  See goal sheet for details and updates.    Plan:   Frequency/Duration:  Discharge from Hand Therapy; continue home program. Follow-up with referring provider for potential surgical intervention     Recommendations for Home Program   Trigger Finger  Orthosis Wear and Care  Ball Massage  Finger Active Range of Motion Tendon Glides Fist Series  Finger Passive Range of Motion Composite Flexion

## 2023-04-19 ENCOUNTER — THERAPY VISIT (OUTPATIENT)
Dept: OCCUPATIONAL THERAPY | Facility: CLINIC | Age: 48
End: 2023-04-19
Payer: COMMERCIAL

## 2023-04-19 DIAGNOSIS — M79.644 PAIN OF FINGER OF RIGHT HAND: ICD-10-CM

## 2023-04-19 DIAGNOSIS — M65.30 TRIGGER FINGER, ACQUIRED: Primary | ICD-10-CM

## 2023-04-19 PROCEDURE — 97535 SELF CARE MNGMENT TRAINING: CPT | Mod: GO

## 2023-06-14 PROBLEM — M79.644 PAIN OF FINGER OF RIGHT HAND: Status: RESOLVED | Noted: 2022-12-15 | Resolved: 2023-06-14

## 2023-06-14 PROBLEM — M65.30 TRIGGER FINGER, ACQUIRED: Status: RESOLVED | Noted: 2022-12-15 | Resolved: 2023-06-14

## 2023-06-14 NOTE — PROGRESS NOTES
DISCHARGE  Reason for Discharge: Patient did not return to therapy. Current episode of care will be resolved. D/C from hand therapy.    Referring Provider:  Daniel Bauer

## 2023-09-01 DIAGNOSIS — N52.9 ERECTILE DYSFUNCTION, UNSPECIFIED ERECTILE DYSFUNCTION TYPE: ICD-10-CM

## 2023-09-01 RX ORDER — SILDENAFIL 50 MG/1
TABLET, FILM COATED ORAL
Qty: 60 TABLET | Refills: 0 | Status: SHIPPED | OUTPATIENT
Start: 2023-09-01

## 2023-10-02 ENCOUNTER — TRANSFERRED RECORDS (OUTPATIENT)
Dept: HEALTH INFORMATION MANAGEMENT | Facility: CLINIC | Age: 48
End: 2023-10-02
Payer: COMMERCIAL

## 2023-11-07 DIAGNOSIS — G89.29 CHRONIC LOW BACK PAIN, UNSPECIFIED BACK PAIN LATERALITY, UNSPECIFIED WHETHER SCIATICA PRESENT: ICD-10-CM

## 2023-11-07 DIAGNOSIS — M54.50 CHRONIC LOW BACK PAIN, UNSPECIFIED BACK PAIN LATERALITY, UNSPECIFIED WHETHER SCIATICA PRESENT: ICD-10-CM

## 2023-11-07 RX ORDER — GABAPENTIN 300 MG/1
CAPSULE ORAL
Qty: 270 CAPSULE | Refills: 0 | Status: SHIPPED | OUTPATIENT
Start: 2023-11-07 | End: 2024-04-17
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
## 2023-11-14 ENCOUNTER — OFFICE VISIT (OUTPATIENT)
Dept: ORTHOPEDICS | Facility: CLINIC | Age: 48
End: 2023-11-14
Payer: COMMERCIAL

## 2023-11-14 VITALS
HEIGHT: 72 IN | WEIGHT: 223 LBS | DIASTOLIC BLOOD PRESSURE: 94 MMHG | BODY MASS INDEX: 30.2 KG/M2 | SYSTOLIC BLOOD PRESSURE: 143 MMHG

## 2023-11-14 DIAGNOSIS — M65.331 TRIGGER FINGER, RIGHT MIDDLE FINGER: Primary | ICD-10-CM

## 2023-11-14 PROCEDURE — 20550 NJX 1 TENDON SHEATH/LIGAMENT: CPT | Mod: F7 | Performed by: STUDENT IN AN ORGANIZED HEALTH CARE EDUCATION/TRAINING PROGRAM

## 2023-11-14 PROCEDURE — 99207 PR DROP WITH A PROCEDURE: CPT | Performed by: STUDENT IN AN ORGANIZED HEALTH CARE EDUCATION/TRAINING PROGRAM

## 2023-11-14 RX ORDER — LIDOCAINE HYDROCHLORIDE 10 MG/ML
1 INJECTION, SOLUTION INFILTRATION; PERINEURAL
Status: SHIPPED | OUTPATIENT
Start: 2023-11-14

## 2023-11-14 RX ORDER — TESTOSTERONE CYPIONATE 200 MG/ML
1 INJECTION INTRAMUSCULAR
Status: SHIPPED | OUTPATIENT
Start: 2023-11-14

## 2023-11-14 RX ADMIN — LIDOCAINE HYDROCHLORIDE 1 ML: 10 INJECTION, SOLUTION INFILTRATION; PERINEURAL at 10:06

## 2023-11-14 RX ADMIN — TESTOSTERONE CYPIONATE 1 ML: 200 INJECTION INTRAMUSCULAR at 10:06
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
## 2023-12-19 ENCOUNTER — OFFICE VISIT (OUTPATIENT)
Dept: DERMATOLOGY | Facility: CLINIC | Age: 48
End: 2023-12-19
Payer: COMMERCIAL

## 2023-12-19 DIAGNOSIS — L29.9 PRURITUS: ICD-10-CM

## 2023-12-19 DIAGNOSIS — L30.9 DERMATITIS: ICD-10-CM

## 2023-12-19 DIAGNOSIS — L65.9 HAIR LOSS: Primary | ICD-10-CM

## 2023-12-19 PROCEDURE — 99204 OFFICE O/P NEW MOD 45 MIN: CPT | Performed by: DERMATOLOGY

## 2023-12-19 RX ORDER — FINASTERIDE 5 MG/1
TABLET, FILM COATED ORAL
Qty: 10 TABLET | Refills: 11 | Status: SHIPPED | OUTPATIENT
Start: 2023-12-19 | End: 2024-03-12

## 2023-12-19 RX ORDER — TRIAMCINOLONE ACETONIDE 1 MG/G
OINTMENT TOPICAL
Qty: 30 G | Refills: 1 | Status: SHIPPED | OUTPATIENT
Start: 2023-12-19 | End: 2024-04-17

## 2023-12-19 RX ORDER — MINOXIDIL 2.5 MG/1
2.5 TABLET ORAL DAILY
Qty: 30 TABLET | Refills: 11 | Status: SHIPPED | OUTPATIENT
Start: 2023-12-19 | End: 2024-04-17

## 2023-12-19 RX ORDER — MOMETASONE FUROATE 1 MG/G
OINTMENT TOPICAL
Qty: 45 G | Refills: 1 | Status: SHIPPED | OUTPATIENT
Start: 2023-12-19 | End: 2024-04-17

## 2023-12-19 ASSESSMENT — PAIN SCALES - GENERAL: PAINLEVEL: NO PAIN (0)

## 2023-12-19 NOTE — LETTER
12/19/2023      RE: Romario Heinjalen  [address] [facility] [address]     Dear Colleague,    Thank you for the opportunity to participate in the care of your patient, Romario Lizarraga, at the [facility]. Please see a copy of my visit note below.    Dermatology Clinic Note    Dermatology Problem List:  1. Benign pigmented nevi   2. Intertrigo  3. Dyshidrotic dermatitis  4. Folliculitis of the shoulders  5. Condyloma  6. Irritant contact dermatitis of the buttock/scrotum  7. Male pattern hair loss  8. Irritant vs allergic contact dermatitis on the abdomen    Assessment and Plan:    Androgenetic hair loss. Chronic and progressive. We reviewed treatment options. Will plan on initiating treatment with finasteride 1.25 mg daily and minoxidil 2.5 mg daily. Reviewed potential side effects of slight blood pressure changes, sexual dysfunction.   Suspected allergic contact dermatitis on the lower abdomen. Given the distribution, metal allergy from the belt is possible allergen. Recommended use of mometasone ointment twice daily, avoid metal belt kathya.   Irritant contact dermatitis on the scrotum and perirectal area with maceration and lichenification. Chronic. Will plan to start with triamcinolone ointment 0.1% BID to areas for the next month and then will slowly deescalate. Wash area daily with mild soap and use a thick emollient daily. Could address sweating in the future.     Thank you for involving me in this patient's care.     RTC in 1 month.     Sandie Ríos MD  Dermatology Staff    CC:     Daniel Bauer MD, MD  ____________________________________________________________________________________________________________________________________________    CC: Patient presents with:  Hair Loss: Romario is here for Hairloss evaluation.    HPI: Romario Daen Elham is a 48 year old male presenting for:  Slow progressive hair  loss. No treatment so far. Family history of early hair loss.   Irritation on the abdomen waxing and waning with associated itch. Worse after shaving the area.   Itch of the scrotum and rectal area since childhood. Uses hydrocortisone over the counter which offers some improvement. Notes sweat as a likely trigger.     Patient Active Problem List   Diagnosis    CARDIOVASCULAR SCREENING; LDL GOAL LESS THAN 160    Seborrheic dermatitis    Mild persistent asthma without complication    Chronic low back pain, unspecified back pain laterality, unspecified whether sciatica present    Chronic pain in testicle    Decreased libido       No Known Allergies      Current Outpatient Medications   Medication    albuterol (PROAIR HFA/PROVENTIL HFA/VENTOLIN HFA) 108 (90 Base) MCG/ACT inhaler    gabapentin (NEURONTIN) 300 MG capsule    ibuprofen (ADVIL/MOTRIN) 200 MG tablet    sildenafil (VIAGRA) 50 MG tablet    fluticasone-salmeterol (ADVAIR) 250-50 MCG/DOSE inhaler    imiquimod (ALDARA) 5 % external cream     Current Facility-Administered Medications   Medication    dexAMETHasone (DECADRON) injection 1 mL    lidocaine 1 % injection 1 mL    lidocaine 1% with EPINEPHrine 1:100,000 injection 3 mL       Family History   Problem Relation Age of Onset    Hypertension Father     Cancer Maternal Grandmother         lung    Heart Disease Maternal Grandfather         copd    Cancer - colorectal Paternal Grandmother     Heart Disease Paternal Grandfather     Cancer Maternal Uncle 50        prostate    Cancer Paternal Aunt         leukemia    Breast Cancer Paternal Aunt     Diabetes Paternal Uncle     Breast Cancer Other     Melanoma No family hx of     Skin Cancer No family hx of        Social History     Tobacco Use    Smoking status: Former     Packs/day: 0.00     Years: 0.00     Additional pack years: 0.00     Total pack years: 0.00     Types: Cigarettes    Smokeless tobacco: Never    Tobacco comments:     I quit 20 years ago   Vaping Use     Vaping Use: Never used   Substance Use Topics    Alcohol use: Yes     Comment: occ    Drug use: No   . Has 2 daughters. Wife is ED MD.     EXAM:  PHYSICAL EXAM:  There were no vitals taken for this visit.  Gen: Alert. No distress  Scalp without erythema or scaling. Normal eyelashes and brows.   Bitemporal recession with mild decreased density on the vertex scalp  L hemiscrotum with erythema and lichenification   Maceration and lichenification of perirectal buttocks  Indurated pink plaques on the central lower abdomen.     Please do not hesitate to contact me if you have any questions/concerns.     Sincerely,       Sandie Ríos MD

## 2023-12-19 NOTE — NURSING NOTE
Dermatology Rooming Note    Romario Lizarraga's goals for this visit include:   Chief Complaint   Patient presents with    Hair Loss     Romario is here for Hairloss evaluation.     Raman Lipscomb, EMT

## 2023-12-19 NOTE — PATIENT INSTRUCTIONS
I sent prescriptions for both finasteride (take 1/4 tab) daily ongoing  I sent prescription for minoxidil. This is a blood pressure med, but in most cases it does not impact blood pressure.     For stomach, I wonder about the belt metal. I would try wearing a different belt without metal for at least 6 weeks. Apply mometasone to the stomach 1-2 times daily until totally clear, then as needed.       For the buttock and scrotum, apply triamcinolone ointment nightly and a moisturizer daily. Use until the next visit.     Pediatric Dermatology  [address] [address]  [phone]    Gentle Skin Care    Below is a list of products our providers recommend for gentle skin care.  Moisturizers:  Lighter; Exederm Intensive Moisture Cream, Cetaphil Cream, CeraVe, Aveeno Positively radiant and Vanicream Light   Thicker; Aquaphor Ointment, Vaseline, Petroleum Jelly, Eucerin Original Healing Cream and Vanicream, CeraVe Healing Ointment, Aquaphor Body Spray  Avoid Lotions (too thin)  Mild Cleansers:  Dove- Fragrance Free bar or wash  CeraVe   Vanicream Cleansing bar  Cetaphil Cleanser   Aquaphor 2 in1 Gentle Wash and Shampoo  Dove Baby wash  Exederm Body wash       Laundry Products:    All Free and Clear  Cheer Free  Generic Brands are okay as long as they are  Fragrance Free    Avoid fabric softeners  and dryer sheets   Sunscreens: SPF 30 or greater     Sunscreens that contain Zinc Oxide and/or Titanium Dioxide should be applied, these are physical blockers. One or both of these should be listed in the  Active Ingredients   Any other listed ingredients under the active ingredients would be a chemically based sunscreen which might be irritating.  Spray sunscreens should be avoided because these are typically chemical sunscreens.      Shampoo and Conditioners:  Free and Clear by Vanicream  Aquaphor 2 in 1 Gentle Wash and Shampoo   Oils:  Mineral Oil   Emu Oil   For some patients:  Coconut (raw, unrefined, organic) and Sunflower seed oil              Generic Products are an okay substitute, but make sure they are fragrance free.  *Reading the product ingredients list is very important  *Avoid product that have fragrance added to them.   *Organic does not mean  fragrance free.  In fact patients with sensitive skin can become quite irritated by some organic products.     Daily bathing is recommended. Make sure you are applying a good moisturizer after bathing every time.  Use Moisturizing creams at least twice daily to the whole body. Your provider may recommend a lighter or heavier moisturizer based on your child s severity and that time of year it is.  Creams are more moisturizing than lotions.       Care Plan:  Keep bathing and showering short, less than 15 minutes   Always use lukewarm warm when possible. AVOID HOT or COLD water  DO NOT use bubble bath  Limit the use of soaps. Focus on the skin folds, face, armpits, groin and feet towards the end of the bath  Do NOT vigorously scrub when you cleanse the skin  After bathing, PAT your skin lightly with a towel. DO NOT rub or scrub when drying  ALWAYS apply a moisturizer immediately after bathing. This helps to  lock in  the moisture. * IF YOU WERE PRESCRIBED A TOPICAL MEDICATION, APPLY YOUR MEDICATION FIRST THEN COVER WITH YOUR DAILY MOISTURIZER  Reapply moisturizing agents at least twice daily to your whole body    Other helpful tips:  Do not use products such as powders, perfumes, or colognes on your skin  Diffusers can be harsh on sensitive skin, use with caution if you or your child has sensitive skin   Avoid saunas and steam baths. This temperature is too HOT  Avoid tight or  scratchy  clothing such as wool  Always wash new clothing before wearing them for the first time  Sometimes a humidifier or vaporizer can be used at night can help the dry skin. Remember to keep these items clean to avoid mold growth.

## 2023-12-19 NOTE — PROGRESS NOTES
Dermatology Clinic Note    Dermatology Problem List:  1. Benign pigmented nevi   2. Intertrigo  3. Dyshidrotic dermatitis  4. Folliculitis of the shoulders  5. Condyloma  6. Irritant contact dermatitis of the buttock/scrotum  7. Male pattern hair loss  8. Irritant vs allergic contact dermatitis on the abdomen    Assessment and Plan:    Androgenetic hair loss. Chronic and progressive. We reviewed treatment options. Will plan on initiating treatment with finasteride 1.25 mg daily and minoxidil 2.5 mg daily. Reviewed potential side effects of slight blood pressure changes, sexual dysfunction.   Suspected allergic contact dermatitis on the lower abdomen. Given the distribution, metal allergy from the belt is possible allergen. Recommended use of mometasone ointment twice daily, avoid metal belt kathya.   Irritant contact dermatitis on the scrotum and perirectal area with maceration and lichenification. Chronic. Will plan to start with triamcinolone ointment 0.1% BID to areas for the next month and then will slowly deescalate. Wash area daily with mild soap and use a thick emollient daily. Could address sweating in the future.     Thank you for involving me in this patient's care.     RTC in 1 month.     Sandie Ríos MD  Dermatology Staff    CC:     Daniel Bauer MD, MD  ____________________________________________________________________________________________________________________________________________    CC: Patient presents with:  Hair Loss: Romario is here for Hairloss evaluation.    HPI: Romario Lizarraga is a 48 year old male presenting for:  Slow progressive hair loss. No treatment so far. Family history of early hair loss.   Irritation on the abdomen waxing and waning with associated itch. Worse after shaving the area.   Itch of the scrotum and rectal area since childhood. Uses hydrocortisone over the counter which offers some improvement. Notes sweat as a likely trigger.     Patient Active Problem List    Diagnosis    CARDIOVASCULAR SCREENING; LDL GOAL LESS THAN 160    Seborrheic dermatitis    Mild persistent asthma without complication    Chronic low back pain, unspecified back pain laterality, unspecified whether sciatica present    Chronic pain in testicle    Decreased libido       No Known Allergies      Current Outpatient Medications   Medication    albuterol (PROAIR HFA/PROVENTIL HFA/VENTOLIN HFA) 108 (90 Base) MCG/ACT inhaler    gabapentin (NEURONTIN) 300 MG capsule    ibuprofen (ADVIL/MOTRIN) 200 MG tablet    sildenafil (VIAGRA) 50 MG tablet    fluticasone-salmeterol (ADVAIR) 250-50 MCG/DOSE inhaler    imiquimod (ALDARA) 5 % external cream     Current Facility-Administered Medications   Medication    dexAMETHasone (DECADRON) injection 1 mL    lidocaine 1 % injection 1 mL    lidocaine 1% with EPINEPHrine 1:100,000 injection 3 mL       Family History   Problem Relation Age of Onset    Hypertension Father     Cancer Maternal Grandmother         lung    Heart Disease Maternal Grandfather         copd    Cancer - colorectal Paternal Grandmother     Heart Disease Paternal Grandfather     Cancer Maternal Uncle 50        prostate    Cancer Paternal Aunt         leukemia    Breast Cancer Paternal Aunt     Diabetes Paternal Uncle     Breast Cancer Other     Melanoma No family hx of     Skin Cancer No family hx of        Social History     Tobacco Use    Smoking status: Former     Packs/day: 0.00     Years: 0.00     Additional pack years: 0.00     Total pack years: 0.00     Types: Cigarettes    Smokeless tobacco: Never    Tobacco comments:     I quit 20 years ago   Vaping Use    Vaping Use: Never used   Substance Use Topics    Alcohol use: Yes     Comment: occ    Drug use: No   . Has 2 daughters. Wife is ED MD.       EXAM:  PHYSICAL EXAM:  There were no vitals taken for this visit.  Gen: Alert. No distress  Scalp without erythema or scaling. Normal eyelashes and brows.   Bitemporal recession with mild decreased  density on the vertex scalp  L hemiscrotum with erythema and lichenification   Maceration and lichenification of perirectal buttocks  Indurated pink plaques on the central lower abdomen.

## 2024-01-16 ENCOUNTER — OFFICE VISIT (OUTPATIENT)
Dept: DERMATOLOGY | Facility: CLINIC | Age: 49
End: 2024-01-16
Payer: COMMERCIAL

## 2024-01-16 DIAGNOSIS — L30.9 DERMATITIS: Primary | ICD-10-CM

## 2024-01-16 DIAGNOSIS — D49.2 SKIN NEOPLASM: ICD-10-CM

## 2024-01-16 PROCEDURE — 88305 TISSUE EXAM BY PATHOLOGIST: CPT | Performed by: DERMATOLOGY

## 2024-01-16 PROCEDURE — 11102 TANGNTL BX SKIN SINGLE LES: CPT | Performed by: DERMATOLOGY

## 2024-01-16 PROCEDURE — 99214 OFFICE O/P EST MOD 30 MIN: CPT | Mod: 25 | Performed by: DERMATOLOGY

## 2024-01-16 RX ORDER — LIDOCAINE HYDROCHLORIDE AND EPINEPHRINE 10; 10 MG/ML; UG/ML
3 INJECTION, SOLUTION INFILTRATION; PERINEURAL ONCE
Status: COMPLETED | OUTPATIENT
Start: 2024-01-16 | End: 2024-01-16

## 2024-01-16 RX ORDER — DESONIDE 0.5 MG/G
OINTMENT TOPICAL
Qty: 30 G | Refills: 1 | Status: SHIPPED | OUTPATIENT
Start: 2024-01-16 | End: 2024-04-17

## 2024-01-16 RX ORDER — TACROLIMUS 1 MG/G
OINTMENT TOPICAL
Qty: 30 G | Refills: 3 | Status: SHIPPED | OUTPATIENT
Start: 2024-01-16 | End: 2024-04-17

## 2024-01-16 RX ADMIN — LIDOCAINE HYDROCHLORIDE AND EPINEPHRINE 3 ML: 10; 10 INJECTION, SOLUTION INFILTRATION; PERINEURAL at 09:26

## 2024-01-16 NOTE — PROGRESS NOTES
Dermatology Clinic Note    Dermatology Problem List:  1. Benign pigmented nevi   2. Intertrigo  3. Dyshidrotic dermatitis  4. Folliculitis of the shoulders  5. Condyloma  6. Irritant contact dermatitis of the buttock/scrotum  7. Male pattern hair loss  8. Irritant vs allergic contact dermatitis on the abdomen    Assessment and Plan:    Androgenetic hair loss. Chronic and progressive. Continue with finasteride 1.25 mg daily and minoxidil 2.5 mg daily. Will recheck at next visit.   Suspected allergic contact dermatitis on the lower abdomen. Metal allergy from the belt favored. Improved. Mometasone bid until clear, then as needed.   Irritant contact dermatitis on the scrotum and perirectal area with maceration and lichenification. Chronic.Improved on the scrotum, but ongoing maceration in the perirectal area and perineum. Will step yolanda to desonide ointment daily for the next month, then tacrolimus 0.1% daily as needed.   Dermatofibroma: Collection of excess collagen and fibrosis at past sites of skin injury. Benign. No treatment advised as this may result in a larger lesion.   Neoplasm of uncertain behavior on the nose: Bleeding. Suspect fibrous papule. Shave removal today.   Shave biopsy:  After discussion of benefits and risks including but not limited to bleeding/bruising, pain/swelling, infection, scar, incomplete removal, nerve damage/numbness, recurrence, and non-diagnostic biopsy, written consent, verbal consent and photographs were obtained. Time-out was performed. The area was cleaned with isopropyl alcohol. 1 mL of 1% lidocaine with epinephrine was injected to obtain adequate anesthesia of the lesion on the L nasal sidewall. A shave biopsy was performed. Hemostasis was achieved with electrocautery then aluminium chloride. Vaseline and a sterile dressing were applied. The patient tolerated the procedure and no complications were noted. The patient was provided with verbal and written post care instructions.        Thank you for involving me in this patient's care.     RTC in 1 month.     Sandie Ríos MD  Dermatology Staff    CC:     Daniel Bauer MD, MD  ____________________________________________________________________________________________________________________________________________    CC: Patient presents with:  RECHECK: Romario reports abdominal scarring no itching, using different soap, shampoo and belt, anal and testicle itching resolved what happens if flares occur again and preventative tx plan, and spot on left side of nose- scratches and heals, using hair growth products hard to tell       HPI: Romario Lizarraga is a 48 year old male presenting for recheck of genital dermatitis. He reports improved itch with use of triamcinolone 0.1% daily. Notes that stomach rash is also improved. Has a bump on the nose that itches and sometimes bleeds. Notes a lump on the R calf that itches.     Patient Active Problem List   Diagnosis    CARDIOVASCULAR SCREENING; LDL GOAL LESS THAN 160    Seborrheic dermatitis    Mild persistent asthma without complication    Chronic low back pain, unspecified back pain laterality, unspecified whether sciatica present    Chronic pain in testicle    Decreased libido       No Known Allergies      Current Outpatient Medications   Medication    finasteride (PROSCAR) 5 MG tablet    Fluticasone-Salmeterol (ADVAIR HFA IN)    gabapentin (NEURONTIN) 300 MG capsule    ibuprofen (ADVIL/MOTRIN) 200 MG tablet    minoxidil (LONITEN) 2.5 MG tablet    mometasone (ELOCON) 0.1 % external ointment    sildenafil (VIAGRA) 50 MG tablet    triamcinolone (KENALOG) 0.1 % external ointment    albuterol (PROAIR HFA/PROVENTIL HFA/VENTOLIN HFA) 108 (90 Base) MCG/ACT inhaler     Current Facility-Administered Medications   Medication    dexAMETHasone (DECADRON) injection 1 mL    lidocaine 1 % injection 1 mL    lidocaine 1% with EPINEPHrine 1:100,000 injection 3 mL       Family History   Problem Relation Age of  Onset    Hypertension Father     Cancer Maternal Grandmother         lung    Heart Disease Maternal Grandfather         copd    Cancer - colorectal Paternal Grandmother     Heart Disease Paternal Grandfather     Cancer Maternal Uncle 50        prostate    Cancer Paternal Aunt         leukemia    Breast Cancer Paternal Aunt     Diabetes Paternal Uncle     Breast Cancer Other     Melanoma No family hx of     Skin Cancer No family hx of        Social History     Tobacco Use    Smoking status: Former     Packs/day: 0.00     Years: 0.00     Additional pack years: 0.00     Total pack years: 0.00     Types: Cigarettes    Smokeless tobacco: Never    Tobacco comments:     I quit 20 years ago   Vaping Use    Vaping Use: Never used   Substance Use Topics    Alcohol use: Yes     Comment: occ    Drug use: No   . Has 2 daughters. Wife is ED MD.       EXAM:  PHYSICAL EXAM:  There were no vitals taken for this visit.  Gen: Alert. No distress  L hemiscrotum with scattered red papules  Maceration and lichenification of perirectal buttocks and perineum, but improved  Brown papule on the R medial thigh with +dimple sign  Indurated pink plaque on the central lower abdomen  Pink papule 2 mm on the L lateral nasal sidewall with pinpoint vessels

## 2024-01-16 NOTE — LETTER
1/16/2024      RE: Romario Lizarraga  [address] [facility] [address]     Dear Colleague,    Thank you for the opportunity to participate in the care of your patient, Romario Lizarraga, at the [facility] at [facility]. Please see a copy of my visit note below.    Dermatology Clinic Note    Dermatology Problem List:  1. Benign pigmented nevi   2. Intertrigo  3. Dyshidrotic dermatitis  4. Folliculitis of the shoulders  5. Condyloma  6. Irritant contact dermatitis of the buttock/scrotum  7. Male pattern hair loss  8. Irritant vs allergic contact dermatitis on the abdomen    Assessment and Plan:    Androgenetic hair loss. Chronic and progressive. Continue with finasteride 1.25 mg daily and minoxidil 2.5 mg daily. Will recheck at next visit.   Suspected allergic contact dermatitis on the lower abdomen. Metal allergy from the belt favored. Improved. Mometasone bid until clear, then as needed.   Irritant contact dermatitis on the scrotum and perirectal area with maceration and lichenification. Chronic.Improved on the scrotum, but ongoing maceration in the perirectal area and perineum. Will step yolanda to desonide ointment daily for the next month, then tacrolimus 0.1% daily as needed.   Dermatofibroma: Collection of excess collagen and fibrosis at past sites of skin injury. Benign. No treatment advised as this may result in a larger lesion.   Neoplasm of uncertain behavior on the nose: Bleeding. Suspect fibrous papule. Shave removal today.   Shave biopsy:  After discussion of benefits and risks including but not limited to bleeding/bruising, pain/swelling, infection, scar, incomplete removal, nerve damage/numbness, recurrence, and non-diagnostic biopsy, written consent, verbal consent and photographs were obtained. Time-out was performed. The area was cleaned with isopropyl alcohol. 1 mL of 1% lidocaine with epinephrine was injected to  obtain adequate anesthesia of the lesion on the L nasal sidewall. A shave biopsy was performed. Hemostasis was achieved with electrocautery then aluminium chloride. Vaseline and a sterile dressing were applied. The patient tolerated the procedure and no complications were noted. The patient was provided with verbal and written post care instructions.       Thank you for involving me in this patient's care.     RTC in 1 month.     Sandie Ríos MD  Dermatology Staff    CC:     Daniel Bauer MD, MD  ____________________________________________________________________________________________________________________________________________    CC: Patient presents with:  RECHECK: Romario reports abdominal scarring no itching, using different soap, shampoo and belt, anal and testicle itching resolved what happens if flares occur again and preventative tx plan, and spot on left side of nose- scratches and heals, using hair growth products hard to tell       HPI: Romario Lizarraga is a 48 year old male presenting for recheck of genital dermatitis. He reports improved itch with use of triamcinolone 0.1% daily. Notes that stomach rash is also improved. Has a bump on the nose that itches and sometimes bleeds. Notes a lump on the R calf that itches.     Patient Active Problem List   Diagnosis    CARDIOVASCULAR SCREENING; LDL GOAL LESS THAN 160    Seborrheic dermatitis    Mild persistent asthma without complication    Chronic low back pain, unspecified back pain laterality, unspecified whether sciatica present    Chronic pain in testicle    Decreased libido       No Known Allergies      Current Outpatient Medications   Medication    finasteride (PROSCAR) 5 MG tablet    Fluticasone-Salmeterol (ADVAIR HFA IN)    gabapentin (NEURONTIN) 300 MG capsule    ibuprofen (ADVIL/MOTRIN) 200 MG tablet    minoxidil (LONITEN) 2.5 MG tablet    mometasone (ELOCON) 0.1 % external ointment    sildenafil (VIAGRA) 50 MG tablet    triamcinolone  (KENALOG) 0.1 % external ointment    albuterol (PROAIR HFA/PROVENTIL HFA/VENTOLIN HFA) 108 (90 Base) MCG/ACT inhaler     Current Facility-Administered Medications   Medication    dexAMETHasone (DECADRON) injection 1 mL    lidocaine 1 % injection 1 mL    lidocaine 1% with EPINEPHrine 1:100,000 injection 3 mL       Family History   Problem Relation Age of Onset    Hypertension Father     Cancer Maternal Grandmother         lung    Heart Disease Maternal Grandfather         copd    Cancer - colorectal Paternal Grandmother     Heart Disease Paternal Grandfather     Cancer Maternal Uncle 50        prostate    Cancer Paternal Aunt         leukemia    Breast Cancer Paternal Aunt     Diabetes Paternal Uncle     Breast Cancer Other     Melanoma No family hx of     Skin Cancer No family hx of        Social History     Tobacco Use    Smoking status: Former     Packs/day: 0.00     Years: 0.00     Additional pack years: 0.00     Total pack years: 0.00     Types: Cigarettes    Smokeless tobacco: Never    Tobacco comments:     I quit 20 years ago   Vaping Use    Vaping Use: Never used   Substance Use Topics    Alcohol use: Yes     Comment: occ    Drug use: No   . Has 2 daughters. Wife is ED MD.       EXAM:  PHYSICAL EXAM:  There were no vitals taken for this visit.  Gen: Alert. No distress  L hemiscrotum with scattered red papules  Maceration and lichenification of perirectal buttocks and perineum, but improved  Brown papule on the R medial thigh with +dimple sign  Indurated pink plaque on the central lower abdomen  Pink papule 2 mm on the L lateral nasal sidewall with pinpoint vessels      Please do not hesitate to contact me if you have any questions/concerns.     Sincerely,       Sandie Ríos MD

## 2024-01-16 NOTE — PATIENT INSTRUCTIONS
For the next month, use desonide nightly  After a month, trial the tacrolimus nightly if rash is still present. Test area first as can sting    Nose biopsy today.     Leg lesions is dermatofibroma

## 2024-02-06 ASSESSMENT — ASTHMA QUESTIONNAIRES
QUESTION_3 LAST FOUR WEEKS HOW OFTEN DID YOUR ASTHMA SYMPTOMS (WHEEZING, COUGHING, SHORTNESS OF BREATH, CHEST TIGHTNESS OR PAIN) WAKE YOU UP AT NIGHT OR EARLIER THAN USUAL IN THE MORNING: NOT AT ALL
ACT_TOTALSCORE: 25
QUESTION_5 LAST FOUR WEEKS HOW WOULD YOU RATE YOUR ASTHMA CONTROL: COMPLETELY CONTROLLED
ACT_TOTALSCORE: 25
QUESTION_1 LAST FOUR WEEKS HOW MUCH OF THE TIME DID YOUR ASTHMA KEEP YOU FROM GETTING AS MUCH DONE AT WORK, SCHOOL OR AT HOME: NONE OF THE TIME
QUESTION_4 LAST FOUR WEEKS HOW OFTEN HAVE YOU USED YOUR RESCUE INHALER OR NEBULIZER MEDICATION (SUCH AS ALBUTEROL): NOT AT ALL
QUESTION_2 LAST FOUR WEEKS HOW OFTEN HAVE YOU HAD SHORTNESS OF BREATH: NOT AT ALL

## 2024-02-13 ENCOUNTER — OFFICE VISIT (OUTPATIENT)
Dept: FAMILY MEDICINE | Facility: CLINIC | Age: 49
End: 2024-02-13
Payer: COMMERCIAL

## 2024-02-13 VITALS
DIASTOLIC BLOOD PRESSURE: 81 MMHG | RESPIRATION RATE: 14 BRPM | HEART RATE: 74 BPM | TEMPERATURE: 97.8 F | BODY MASS INDEX: 30.61 KG/M2 | WEIGHT: 226 LBS | HEIGHT: 72 IN | SYSTOLIC BLOOD PRESSURE: 124 MMHG | OXYGEN SATURATION: 97 %

## 2024-02-13 DIAGNOSIS — Z71.84 ENCOUNTER FOR COUNSELING FOR TRAVEL: Primary | ICD-10-CM

## 2024-02-13 DIAGNOSIS — Z23 NEED FOR RABIES VACCINATION: ICD-10-CM

## 2024-02-13 DIAGNOSIS — Z23 NEED FOR IMMUNIZATION AGAINST YELLOW FEVER: ICD-10-CM

## 2024-02-13 PROCEDURE — 90472 IMMUNIZATION ADMIN EACH ADD: CPT | Mod: GA | Performed by: PHYSICIAN ASSISTANT

## 2024-02-13 PROCEDURE — 90675 RABIES VACCINE IM: CPT | Mod: GA | Performed by: PHYSICIAN ASSISTANT

## 2024-02-13 PROCEDURE — 90717 YELLOW FEVER VACCINE SUBQ: CPT | Mod: GA | Performed by: PHYSICIAN ASSISTANT

## 2024-02-13 PROCEDURE — 90471 IMMUNIZATION ADMIN: CPT | Mod: GA | Performed by: PHYSICIAN ASSISTANT

## 2024-02-13 PROCEDURE — 99401 PREV MED CNSL INDIV APPRX 15: CPT | Mod: 25 | Performed by: PHYSICIAN ASSISTANT

## 2024-02-13 RX ORDER — ACETAZOLAMIDE 125 MG/1
125 TABLET ORAL 2 TIMES DAILY
Qty: 26 TABLET | Refills: 0 | Status: SHIPPED | OUTPATIENT
Start: 2024-02-13 | End: 2024-04-17

## 2024-02-13 RX ORDER — DEXAMETHASONE 4 MG/1
4 TABLET ORAL EVERY 6 HOURS PRN
Qty: 28 TABLET | Refills: 0 | Status: SHIPPED | OUTPATIENT
Start: 2024-02-13 | End: 2024-04-17

## 2024-02-13 RX ORDER — AZITHROMYCIN 500 MG/1
TABLET, FILM COATED ORAL
Qty: 9 TABLET | Refills: 0 | Status: SHIPPED | OUTPATIENT
Start: 2024-02-13 | End: 2024-04-17

## 2024-02-13 RX ORDER — ATOVAQUONE AND PROGUANIL HYDROCHLORIDE 250; 100 MG/1; MG/1
1 TABLET, FILM COATED ORAL DAILY
Qty: 21 TABLET | Refills: 0 | Status: SHIPPED | OUTPATIENT
Start: 2024-02-13 | End: 2024-04-17

## 2024-02-13 NOTE — PATIENT INSTRUCTIONS
"See travel packet provided  Recommend ultrathon (mosquito repellant), pepto bismol and imodium  The food and drink choices you make while traveling can impact your likelihood of getting sick.   If you aren't sure if a food or drink is safe, the saying \" BOIL IT, COOK IT, PEEL IT, OR FORGET IT\" can help you decide whether it's okay to consume.   Also bring hand  and sun screen with you.  Safe Travels     If you first start to get mild to moderate diarrhea, take imodium.      If diarrhea is severe or you have a fever with the diarrhea, take the antibiotic (azithromycin).      Today February 13, 2024 you received the    Rabies Vaccine - Booster dose    Yellow Fever (YF)    Typhoid - Oral. This prescription has been faxed to your pharmacy, please take as directed. This is valid for 5 years. .    These appointments can be made as a NURSE ONLY visit.    **It is very important for the vaccinations to be given on the scheduled day(s), this helps ensure you receive the full effectiveness of the vaccine.**    Please call [facility] with any questions [phone]    Thank you for visiting [facility]    "

## 2024-02-13 NOTE — NURSING NOTE
Prior to immunization administration, verified patients identity using patient s name and date of birth. Please see Immunization Activity for additional information.     Screening Questionnaire for Adult Immunization    Are you sick today?   No   Do you have allergies to medications, food, a vaccine component or latex?   No   Have you ever had a serious reaction after receiving a vaccination?   No   Do you have a long-term health problem with heart, lung, kidney, or metabolic disease (e.g., diabetes), asthma, a blood disorder, no spleen, complement component deficiency, a cochlear implant, or a spinal fluid leak?  Are you on long-term aspirin therapy?   Yes   Do you have cancer, leukemia, HIV/AIDS, or any other immune system problem?   No   Do you have a parent, brother, or sister with an immune system problem?   No   In the past 3 months, have you taken medications that affect  your immune system, such as prednisone, other steroids, or anticancer drugs; drugs for the treatment of rheumatoid arthritis, Crohn s disease, or psoriasis; or have you had radiation treatments?   No   Have you had a seizure, or a brain or other nervous system problem?   No   During the past year, have you received a transfusion of blood or blood    products, or been given immune (gamma) globulin or antiviral drug?   No   For women: Are you pregnant or is there a chance you could become       pregnant during the next month?   No   Have you received any vaccinations in the past 4 weeks?   No     Immunization questionnaire was positive for at least one answer.  Notified Ronald Silva.      Patient instructed to remain in clinic for 15 minutes afterwards, and to report any adverse reactions.     Screening performed by Rubia Nash CMA on 2/13/2024 at 7:39 AM.

## 2024-02-13 NOTE — PROGRESS NOTES
SUBJECTIVE: Romario Lizarraga , a 48 year old  male, presents for counseling and information regarding upcoming travel to [place] and [facility]. Special medical concerns include: none. He anticipates the following unusual exposures: none.    Itinerary:  2 separate trips    Departure Date: 3/19/2024 Return date: 3/30/2024 [facility] and [place]    Departure Date: 6/7/2024 Return date: 6/19//2024    Reason for travel (i.e. Business, pleasure): pleasure    Visiting an urban or rural area?: both    Accommodations (i.e. hotel, hostel, friends, family, etc): hotel Air Bnb and camping    Women - First day of your last period: NA    IMMUNIZATION HISTORY  Have you received any vaccinations in the past 4 weeks?  No  Have you ever fainted from having your blood drawn or from an injection?  No  Have you ever had a fever reaction to vaccination?  No  Have you ever had any bad reaction or side effect from any vaccination?  No  Have you ever had hepatitis A or B vaccine?  Yes  Do you live (or work closely) with anyone who has AIDS, an AIDS-like condition, any other immune disorder or who is on chemotherapy for cancer?  No  Have you received any injection of immune globulin or any blood products during the past 12 months?  No    GENERAL MEDICAL HISTORY  Do you have a medical condition that warrants maintenance medication or physician follow-up?  Yes  Do you have a medical condition that is stable now, but that may recur while traveling?  Yes  Has your spleen been removed?  No  Have you had an acute illness or a fever in the past 48 hours?  No  Are you pregnant, or might you become pregnant on this trip?  Any chance of pregnancy?  No  Are you breastfeeding?  No  Do you have HIV, AIDS, an AIDS-like condition, any other immune disorder, leukemia or cancer?  No  Do you have a severe combined immunodeficiency disease?  No  Have you had your thymus gland removed or history of problems with your thymus, such as myasthenia gravis, DiGeorge  syndrome, or thymoma?  No    Do you have severe thrombocytopenia (low platelet count) or a coagulation disorder?  No  Have you ever had a convulsion, seizure, epilepsy, neurologic condition or brain infection?  No  Do you have any stomach conditions?  No  Do you have a G6PD deficiency?  No  Do you have severe renal or kidney impairment?  No  Do you have a history of psychiatric problems?  No  Do you have a problem with strange dreams and/or nightmares?  No  Do you have insomnia?  No  Do you have problems with vaginitis?  No  Do you have psoriasis?  No  Are you prone to motion sickness?  No  Have you ever had headaches, nausea, vomiting, or breathing problems from altitude exposure?  No      Past Medical History:   Diagnosis Date    Low testosterone 12/11/2013      Immunization History   Administered Date(s) Administered    COVID-19 12+ (2023-24) (Pfizer) 10/13/2023    COVID-19 Bivalent 18+ (Moderna) 09/26/2022    COVID-19 MONOVALENT 12+ (Pfizer) 03/21/2021, 04/11/2021, 10/15/2021    Flu, Unspecified 09/27/2015    HEPA 03/13/2007, 11/27/2007    HepB 03/13/2007, 11/27/2007, 03/10/2009    Hepatitis A (ADULT 19+) 03/13/2007, 11/27/2007    Hepatitis B, Adult 03/13/2007, 11/27/2007, 03/10/2009    Influenza (H1N1) 01/29/2010    Influenza (IIV3) PF 03/09/2007, 11/27/2007, 10/24/2008, 03/31/2011, 12/11/2013, 09/21/2014    Influenza Vaccine (Flucelvax Quadrivalent) 09/24/2021    Influenza Vaccine >6 months,quad, PF 12/11/2013, 11/15/2016, 08/30/2018, 09/14/2022    Influenza Vaccine, 6+MO IM (QUADRIVALENT W/PRESERVATIVES) 09/27/2015    Influenza, seasonal, injectable, PF 01/29/2010    Influenza,INJ,MDCK,PF,Quad >6mo(Flucelvax) 09/26/2017, 09/12/2019, 10/15/2020    MMR 01/02/2013    Meningococcal (Menomune ) 03/10/2009    Meningococcal ACWY (Menactra ) 03/10/2009    Pneumococcal 23 valent 08/07/2014    Poliovirus, inactivated (IPV) 03/13/2007    Rabavert 12/15/2015, 12/22/2015, 01/05/2016    Rabies - IM Fibroblast Culture  12/15/2015, 12/22/2015, 01/05/2016    TDAP (Adacel,Boostrix) 03/09/2007    TDAP Vaccine (Adacel) 08/07/2014    Typhoid IM 03/13/2007, 01/02/2013    Typhoid Oral 03/13/2007, 12/15/2015    Typhoid, Unspecified Formulation 01/02/2013    Yellow Fever 01/02/2013       Current Outpatient Medications   Medication Sig Dispense Refill    albuterol (PROAIR HFA/PROVENTIL HFA/VENTOLIN HFA) 108 (90 Base) MCG/ACT inhaler Inhale 2 puffs into the lungs every 4 hours as needed for shortness of breath / dyspnea (Patient not taking: Reported on 1/16/2024) 3 Inhaler 3    desonide (DESOWEN) 0.05 % external ointment To genital and buttock rash daily for 4 weeks, then as needed. 30 g 1    finasteride (PROSCAR) 5 MG tablet Take 1/4 tab daily 10 tablet 11    Fluticasone-Salmeterol (ADVAIR HFA IN)       gabapentin (NEURONTIN) 300 MG capsule TAKE ONE CAPSULE BY MOUTH THREE TIMES DAILY AS NEEDED 270 capsule 0    ibuprofen (ADVIL/MOTRIN) 200 MG tablet Take 800 mg by mouth every 4 hours as needed for mild pain      minoxidil (LONITEN) 2.5 MG tablet Take 1 tablet (2.5 mg) by mouth daily 30 tablet 11    mometasone (ELOCON) 0.1 % external ointment Twice daily to stomach area until totally clear, then twice daily as needed. 45 g 1    sildenafil (VIAGRA) 50 MG tablet TAKE ONE TO TWO TABLETS BY MOUTH DAILY AS NEEDED  FOR ED 60 tablet 0    tacrolimus (PROTOPIC) 0.1 % external ointment To genital rash daily as needed 30 g 3    triamcinolone (KENALOG) 0.1 % external ointment To rash areas on the scrotum and buttock nightly for 4 weeks 30 g 1     No Known Allergies     EXAM: deferred    Immunizations discussed include: Typhoid, Rabies, and Yellow Fever  Malaria prophylaxis recommended: Malarone  Symptomatic treatment for traveler's diarrhea: bismuth subsalicylate, loperamide/diphenoxylate, and azithromycin    ASSESSMENT/PLAN:    (Z71.84) Encounter for counseling for travel  (primary encounter diagnosis)    Comment: Yellow fever, rabies, and oral typhoid  vaccines today. Patient will return or follow-up with PCP as needed. Prophylaxis given for Traveler's diarrhea and Malaria. All questions were answered.     Plan: typhoid (VIVOTIF) CR capsule,         atovaquone-proguanil (MALARONE) 250-100 MG         tablet, azithromycin (ZITHROMAX) 500 MG tablet,        acetaZOLAMIDE (DIAMOX) 125 MG tablet,         dexAMETHasone (DECADRON) 4 MG tablet            (Z23) Need for immunization against yellow fever  Comment:   Plan: YELLOW FEVER, LIVE SQ            (Z23) Need for rabies vaccination  Comment:   Plan: RABIES VACCINE, IM (IMOVAX)              I have reviewed general recommendations for safe travel   including: food/water precautions, insect avoidance, safe sex   practices given high prevalence of HIV and other STDs,   roadway safety. Educational materials and links to the CDC   Traveler's health website have been provided.    Total time 15 minutes, greater than 50 percent in counseling   and coordination of care.

## 2024-03-01 ENCOUNTER — MYC MEDICAL ADVICE (OUTPATIENT)
Dept: PEDIATRICS | Facility: CLINIC | Age: 49
End: 2024-03-01
Payer: COMMERCIAL

## 2024-03-01 DIAGNOSIS — J45.30 MILD PERSISTENT ASTHMA WITHOUT COMPLICATION: Primary | ICD-10-CM

## 2024-03-01 NOTE — TELEPHONE ENCOUNTER
Dr Bauer,     Script not on active med list.     Unable to pend medication.      Disp Refills Start End NURY   fluticasone-salmeterol (ADVAIR) 250-50 MCG/DOSE inhaler (Discontinued) 3 Inhaler 3 12/29/2020 12/19/2023 No   Sig - Route: Inhale 1 puff into the lungs 2 times daily - Inhalation   Patient not taking: Reported on 2/28/2023   Sent to pharmacy as: Fluticasone-Salmeterol 250-50 MCG/DOSE Inhalation Aerosol Powder Breath Activated (ADVAIR)   Class: E-Prescribe   Order: [id number]   E-Prescribing Status: Receipt confirmed by pharmacy (12/29/2020  7:16 AM CST)       Prefers [facility] pharmacy in [place].     Chiquis GARNICA RN on 3/1/2024 at 9:37 AM

## 2024-03-02 RX ORDER — FLUTICASONE PROPIONATE AND SALMETEROL 250; 50 UG/1; UG/1
1 POWDER RESPIRATORY (INHALATION) EVERY 12 HOURS
Qty: 3 EACH | Refills: 3 | Status: SHIPPED | OUTPATIENT
Start: 2024-03-02 | End: 2024-04-17

## 2024-03-08 NOTE — PROGRESS NOTES
"Orthopaedic Surgery Hand and Upper Extremity Clinic Progress Note  Date: Mar 12, 2024    Patient Name: Romario Lizarraga  MRN: [id number]    CHIEF COMPLAINT: trigger finger, right middle finger    Dominant Hand: right  Occupation: graphic design    Update 3/12/24: Patient was last seen 11/14/23. He received a right middle trigger finger injection at that time. He felt it improved his swelling and the sticking stopped. He said the \"lump\" did not go away following the injection.  Last few weeks, he has noticed mild return of swelling of the finger and discomfort at the PIP joint.  Has had only had one triggering episode since the last visit.  The lump remains tender.  He is interested in repeating the injection again before pursuing surgical intervention.    HPI:  Mr. Romario Lizarraga is a 48 year old male right hand dominant who presents with his wife for evaluation of right middle finger triggering. He describes having flare ups over the last few years, with more consistent issues over the last 2 years, especially the last 6 months. Aggravated by manual activities such as building things, tight gripping, swinging a hammer. He notes painful locking and describes it feeling like a \"jammed finger\". Treatment to date: ice, heat, massage, hand therapy, home exercise program, splinting.     PMH  Diabetes: no  Thyroid Problems: no  Smoking: no    VITALS:  There were no vitals filed for this visit.    EXAM:  General: NAD, A&Ox3  HEENT: NC/AT  CV: RRR by peripheral pulse  Pulmonary: Non-labored breathing on RA  RUE:  Skin intact, no deformity  Palpable nodule adjacent to the A1 pulley of the right middle finger, feels more like a retinacular cyst as opposed to tenosynovitis.  Palpable crepitus and pain at A1 pulley right MF  No visible triggering today  Diminished active flexion of MF, nearly able to make a full fist.  5/5 FDP, FDS, EDC  Sensation intact to light touch median, radial, ulnar nerve distributions  Warm " well-perfused, cap refill less than 2 seconds         IMPRESSION AND RECOMMENDATIONS:  . 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    Discussed treatment options, including repeat injection versus A1 pulley release and cyst excision.  Advised that the injection would treat the tenosynovitis that appears to be resolving, it may or may not resolve the joint.  He is going vacation this week, and so therefore we decided to try a repeat injection.    After obtaining written consent, I cleansed the skin over the volar aspect of right middle finger with chlorhexidine.  I then anesthetized the skin with ethyl chloride spray after which I injected 1 cc of 1% lidocaine and 1 cc of dexamethasone 4 mg/mL into the A1 pulley region flexor tendon sheath of the right middle finger.  Attempt was made to aim towards the cyst on the radial border of the A1 pulley.  The patient tolerated this well without complication.    If in 6 weeks, the patient has improved but has residual symptoms, he will contact me to discuss further treatment.  If the nodule persist, he likely would like to have it excised as it is bothersome.  All questions answered.  The patient voiced understanding and agreement.      Rafael Orlando MD    Hand, Upper Extremity & Microvascular Surgery  Department of Orthopaedic Surgery  [facility]      Hand / Upper Extremity Injection/Arthrocentesis: R long A1    Date/Time: 3/12/2024 11:28 AM    Performed by: Rafael Orlando MD  Authorized by: Rafael Orlando MD    Indications:  Pain  Needle Size:  25 G  Guidance: landmark    Approach:  Volar  Condition: trigger finger    Location:  Long finger    Site:  R long A1  Medications:  1 mL dexAMETHasone 120 MG/30ML; 1 mL lidocaine 1 %  Outcome:  Tolerated well, no immediate complications  Procedure discussed: discussed risks, benefits, and  alternatives    Consent Given by:  Patient  Timeout: timeout called immediately prior to procedure    Prep: patient was prepped and draped in usual sterile fashion

## 2024-03-12 ENCOUNTER — OFFICE VISIT (OUTPATIENT)
Dept: DERMATOLOGY | Facility: CLINIC | Age: 49
End: 2024-03-12
Payer: COMMERCIAL

## 2024-03-12 ENCOUNTER — OFFICE VISIT (OUTPATIENT)
Dept: ORTHOPEDICS | Facility: CLINIC | Age: 49
End: 2024-03-12
Payer: COMMERCIAL

## 2024-03-12 VITALS — SYSTOLIC BLOOD PRESSURE: 128 MMHG | DIASTOLIC BLOOD PRESSURE: 88 MMHG

## 2024-03-12 DIAGNOSIS — L65.9 HAIR LOSS: ICD-10-CM

## 2024-03-12 DIAGNOSIS — M65.331 TRIGGER FINGER, RIGHT MIDDLE FINGER: Primary | ICD-10-CM

## 2024-03-12 DIAGNOSIS — L30.9 DERMATITIS: Primary | ICD-10-CM

## 2024-03-12 DIAGNOSIS — L50.9 HIVES: ICD-10-CM

## 2024-03-12 PROCEDURE — 99213 OFFICE O/P EST LOW 20 MIN: CPT | Mod: 25 | Performed by: STUDENT IN AN ORGANIZED HEALTH CARE EDUCATION/TRAINING PROGRAM

## 2024-03-12 PROCEDURE — 20550 NJX 1 TENDON SHEATH/LIGAMENT: CPT | Mod: F7 | Performed by: STUDENT IN AN ORGANIZED HEALTH CARE EDUCATION/TRAINING PROGRAM

## 2024-03-12 PROCEDURE — 99214 OFFICE O/P EST MOD 30 MIN: CPT | Performed by: DERMATOLOGY

## 2024-03-12 RX ORDER — FINASTERIDE 5 MG/1
TABLET, FILM COATED ORAL
Qty: 30 TABLET | Refills: 2 | Status: SHIPPED | OUTPATIENT
Start: 2024-03-12

## 2024-03-12 RX ORDER — TESTOSTERONE CYPIONATE 200 MG/ML
1 INJECTION INTRAMUSCULAR
Status: SHIPPED | OUTPATIENT
Start: 2024-03-12

## 2024-03-12 RX ORDER — LIDOCAINE HYDROCHLORIDE 10 MG/ML
1 INJECTION, SOLUTION INFILTRATION; PERINEURAL
Status: SHIPPED | OUTPATIENT
Start: 2024-03-12

## 2024-03-12 RX ADMIN — TESTOSTERONE CYPIONATE 1 ML: 200 INJECTION INTRAMUSCULAR at 11:28

## 2024-03-12 RX ADMIN — LIDOCAINE HYDROCHLORIDE 1 ML: 10 INJECTION, SOLUTION INFILTRATION; PERINEURAL at 11:28

## 2024-03-12 NOTE — LETTER
"    3/12/2024         RE: Romario Lizarraga  [address] [facility] [address]        Dear Colleague,    Thank you for referring your patient, Romario Lizarraga, to the [facility] [place]. Please see a copy of my visit note below.    Orthopaedic Surgery Hand and Upper Extremity Clinic Progress Note  Date: Mar 12, 2024    Patient Name: Romario Lizarraga  MRN: [id number]    CHIEF COMPLAINT: trigger finger, right middle finger    Dominant Hand: right  Occupation: graphic design    Update 3/12/24: Patient was last seen 11/14/23. He received a right middle trigger finger injection at that time. He felt it improved his swelling and the sticking stopped. He said the \"lump\" did not go away following the injection.  Last few weeks, he has noticed mild return of swelling of the finger and discomfort at the PIP joint.  Has had only had one triggering episode since the last visit.  The lump remains tender.  He is interested in repeating the injection again before pursuing surgical intervention.    HPI:  Mr. Romario Lizarraga is a 48 year old male right hand dominant who presents with his wife for evaluation of right middle finger triggering. He describes having flare ups over the last few years, with more consistent issues over the last 2 years, especially the last 6 months. Aggravated by manual activities such as building things, tight gripping, swinging a hammer. He notes painful locking and describes it feeling like a \"jammed finger\". Treatment to date: ice, heat, massage, hand therapy, home exercise program, splinting.     PMH  Diabetes: no  Thyroid Problems: no  Smoking: no    VITALS:  There were no vitals filed for this visit.    EXAM:  General: NAD, A&Ox3  HEENT: NC/AT  CV: RRR by peripheral pulse  Pulmonary: Non-labored breathing on RA  RUE:  Skin intact, no deformity  Palpable nodule adjacent to the A1 pulley of the right middle finger, feels more like a retinacular cyst " as opposed to tenosynovitis.  Palpable crepitus and pain at A1 pulley right MF  No visible triggering today  Diminished active flexion of MF, nearly able to make a full fist.  5/5 FDP, FDS, EDC  Sensation intact to light touch median, radial, ulnar nerve distributions  Warm well-perfused, cap refill less than 2 seconds         IMPRESSION AND RECOMMENDATIONS:  Mr. 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    Discussed treatment options, including repeat injection versus A1 pulley release and cyst excision.  Advised that the injection would treat the tenosynovitis that appears to be resolving, it may or may not resolve the joint.  He is going vacation this week, and so therefore we decided to try a repeat injection.    After obtaining written consent, I cleansed the skin over the volar aspect of right middle finger with chlorhexidine.  I then anesthetized the skin with ethyl chloride spray after which I injected 1 cc of 1% lidocaine and 1 cc of dexamethasone 4 mg/mL into the A1 pulley region flexor tendon sheath of the right middle finger.  Attempt was made to aim towards the cyst on the radial border of the A1 pulley.  The patient tolerated this well without complication.    If in 6 weeks, the patient has improved but has residual symptoms, he will contact me to discuss further treatment.  If the nodule persist, he likely would like to have it excised as it is bothersome.  All questions answered.  The patient voiced understanding and agreement.      Rafael Orlando MD    Hand, Upper Extremity & Microvascular Surgery  Department of Orthopaedic Surgery  [facility]      Hand / Upper Extremity Injection/Arthrocentesis: R long A1    Date/Time: 3/12/2024 11:28 AM    Performed by: Rafael Orlando MD  Authorized by: Rafael Orlando MD    Indications:  Pain  Needle Size:  25 G  Guidance:  landmark    Approach:  Volar  Condition: trigger finger    Location:  Long finger    Site:  R long A1  Medications:  1 mL dexAMETHasone 120 MG/30ML; 1 mL lidocaine 1 %  Outcome:  Tolerated well, no immediate complications  Procedure discussed: discussed risks, benefits, and alternatives    Consent Given by:  Patient  Timeout: timeout called immediately prior to procedure    Prep: patient was prepped and draped in usual sterile fashion                Again, thank you for allowing me to participate in the care of your patient.        Sincerely,        Rafael Orlando MD

## 2024-03-12 NOTE — PROGRESS NOTES
Dermatology Clinic Note    Dermatology Problem List:  1. Benign pigmented nevi   2. Intertrigo  3. Dyshidrotic dermatitis  4. Folliculitis of the shoulders  5. Condyloma  6. Irritant contact dermatitis of the buttock/scrotum  7. Male pattern hair loss  8. Irritant vs allergic contact dermatitis on the abdomen suspect belt buckle   9. Dermatofibroma  10. Fibrous papule on the nose    Assessment and Plan:    Androgenetic hair loss. Chronic, but new fibers noted along the frontal hairline. Continue with finasteride 1.25 mg daily and minoxidil 2.5 mg daily.   Irritant contact dermatitis on the scrotum and perirectal area: Chronic and improved. Continue tacrolimus 0.1% daily as needed.   Acute urticaria episode: No clear trigger. I advised if recurrent take zyrtec 10 mg BID, famotidine 40 mg BID, benadryl 25 mg at bedtime.       Thank you for involving me in this patient's care.     RTC in 1 year.     Sandie Ríos MD  Dermatology Staff    CC:     Daniel Bauer MD, MD  ____________________________________________________________________________________________________________________________________________    CC: No chief complaint on file.    HPI: Romario Lizarraga is a 48 year old male presenting for recheck of hair loss and genital dermatitis. Notes that genital rash is clear. Very few symptoms. Using the tacrolimus daily. Also had an episode of hives with not clear trigger. Resolved with systemic steroids. Patient wonders what to do if it happens again. No respiratory symptoms. No past history of similar.     Patient Active Problem List   Diagnosis    CARDIOVASCULAR SCREENING; LDL GOAL LESS THAN 160    Seborrheic dermatitis    Mild persistent asthma without complication    Chronic low back pain, unspecified back pain laterality, unspecified whether sciatica present    Chronic pain in testicle    Decreased libido       No Known Allergies      Current Outpatient Medications   Medication    acetaZOLAMIDE (DIAMOX) 125  MG tablet    albuterol (PROAIR HFA/PROVENTIL HFA/VENTOLIN HFA) 108 (90 Base) MCG/ACT inhaler    atovaquone-proguanil (MALARONE) 250-100 MG tablet    azithromycin (ZITHROMAX) 500 MG tablet    desonide (DESOWEN) 0.05 % external ointment    dexAMETHasone (DECADRON) 4 MG tablet    finasteride (PROSCAR) 5 MG tablet    Fluticasone-Salmeterol (ADVAIR HFA IN)    fluticasone-salmeterol (ADVAIR) 250-50 MCG/ACT inhaler    gabapentin (NEURONTIN) 300 MG capsule    ibuprofen (ADVIL/MOTRIN) 200 MG tablet    minoxidil (LONITEN) 2.5 MG tablet    mometasone (ELOCON) 0.1 % external ointment    sildenafil (VIAGRA) 50 MG tablet    tacrolimus (PROTOPIC) 0.1 % external ointment    triamcinolone (KENALOG) 0.1 % external ointment     Current Facility-Administered Medications   Medication    dexAMETHasone (DECADRON) injection 1 mL    lidocaine 1 % injection 1 mL    lidocaine 1% with EPINEPHrine 1:100,000 injection 3 mL       Family History   Problem Relation Age of Onset    Hypertension Father     Cancer Maternal Grandmother         lung    Heart Disease Maternal Grandfather         copd    Cancer - colorectal Paternal Grandmother     Heart Disease Paternal Grandfather     Cancer Maternal Uncle 50        prostate    Cancer Paternal Aunt         leukemia    Breast Cancer Paternal Aunt     Diabetes Paternal Uncle     Breast Cancer Other     Melanoma No family hx of     Skin Cancer No family hx of        Social History     Tobacco Use    Smoking status: Former     Packs/day: 0.00     Years: 0.00     Additional pack years: 0.00     Total pack years: 0.00     Types: Cigarettes    Smokeless tobacco: Never    Tobacco comments:     I quit 20 years ago   Vaping Use    Vaping Use: Never used   Substance Use Topics    Alcohol use: Yes     Comment: occ    Drug use: No   . Has 2 daughters. Wife is ED MD. Upcoming trip to [facility].       EXAM:  PHYSICAL EXAM:  There were no vitals taken for this visit.  Gen: Alert. No distress  Scalp with mild  bitemporal recession with 1 mm fibers along the frontal hair line  Photos showing wheals on the trunk, some with annular and acuate morphology

## 2024-03-12 NOTE — PATIENT INSTRUCTIONS
-For the groin rash use the tacrolimus daily to scrotum, anal area  -If flaring may resume mometasone twice daily for 1-2 weeks, then taper     If hives happen, treatment would be zytec 10 mg twice daily, famotidine 40 mg twice daily, benadryl 25 mg at bedtime, take scheduled until hives are gone, then decrease by one daily per day until off all meds.

## 2024-03-12 NOTE — LETTER
3/12/2024      RE: Romario Lizarraga  [address] [facility] [address]     Dear Colleague,    Thank you for the opportunity to participate in the care of your patient, Romario Lizarraga, at the [facility]. Please see a copy of my visit note below.    Dermatology Clinic Note    Dermatology Problem List:  1. Benign pigmented nevi   2. Intertrigo  3. Dyshidrotic dermatitis  4. Folliculitis of the shoulders  5. Condyloma  6. Irritant contact dermatitis of the buttock/scrotum  7. Male pattern hair loss  8. Irritant vs allergic contact dermatitis on the abdomen suspect belt buckle   9. Dermatofibroma  10. Fibrous papule on the nose    Assessment and Plan:    Androgenetic hair loss. Chronic, but new fibers noted along the frontal hairline. Continue with finasteride 1.25 mg daily and minoxidil 2.5 mg daily.   Irritant contact dermatitis on the scrotum and perirectal area: Chronic and improved. Continue tacrolimus 0.1% daily as needed.   Acute urticaria episode: No clear trigger. I advised if recurrent take zyrtec 10 mg BID, famotidine 40 mg BID, benadryl 25 mg at bedtime.       Thank you for involving me in this patient's care.     RTC in 1 year.     Sandie Ríos MD  Dermatology Staff    CC:     Daniel Bauer MD, MD  ____________________________________________________________________________________________________________________________________________    CC: No chief complaint on file.    HPI: Romario Lizarraga is a 48 year old male presenting for recheck of hair loss and genital dermatitis. Notes that genital rash is clear. Very few symptoms. Using the tacrolimus daily. Also had an episode of hives with not clear trigger. Resolved with systemic steroids. Patient wonders what to do if it happens again. No respiratory symptoms. No past history of similar.     Patient Active Problem List   Diagnosis    CARDIOVASCULAR  SCREENING; LDL GOAL LESS THAN 160    Seborrheic dermatitis    Mild persistent asthma without complication    Chronic low back pain, unspecified back pain laterality, unspecified whether sciatica present    Chronic pain in testicle    Decreased libido       No Known Allergies      Current Outpatient Medications   Medication    acetaZOLAMIDE (DIAMOX) 125 MG tablet    albuterol (PROAIR HFA/PROVENTIL HFA/VENTOLIN HFA) 108 (90 Base) MCG/ACT inhaler    atovaquone-proguanil (MALARONE) 250-100 MG tablet    azithromycin (ZITHROMAX) 500 MG tablet    desonide (DESOWEN) 0.05 % external ointment    dexAMETHasone (DECADRON) 4 MG tablet    finasteride (PROSCAR) 5 MG tablet    Fluticasone-Salmeterol (ADVAIR HFA IN)    fluticasone-salmeterol (ADVAIR) 250-50 MCG/ACT inhaler    gabapentin (NEURONTIN) 300 MG capsule    ibuprofen (ADVIL/MOTRIN) 200 MG tablet    minoxidil (LONITEN) 2.5 MG tablet    mometasone (ELOCON) 0.1 % external ointment    sildenafil (VIAGRA) 50 MG tablet    tacrolimus (PROTOPIC) 0.1 % external ointment    triamcinolone (KENALOG) 0.1 % external ointment     Current Facility-Administered Medications   Medication    dexAMETHasone (DECADRON) injection 1 mL    lidocaine 1 % injection 1 mL    lidocaine 1% with EPINEPHrine 1:100,000 injection 3 mL       Family History   Problem Relation Age of Onset    Hypertension Father     Cancer Maternal Grandmother         lung    Heart Disease Maternal Grandfather         copd    Cancer - colorectal Paternal Grandmother     Heart Disease Paternal Grandfather     Cancer Maternal Uncle 50        prostate    Cancer Paternal Aunt         leukemia    Breast Cancer Paternal Aunt     Diabetes Paternal Uncle     Breast Cancer Other     Melanoma No family hx of     Skin Cancer No family hx of        Social History     Tobacco Use    Smoking status: Former     Packs/day: 0.00     Years: 0.00     Additional pack years: 0.00     Total pack years: 0.00     Types: Cigarettes    Smokeless tobacco:  Never    Tobacco comments:     I quit 20 years ago   Vaping Use    Vaping Use: Never used   Substance Use Topics    Alcohol use: Yes     Comment: occ    Drug use: No   . Has 2 daughters. Wife is ED MD. Upcoming trip to [facility].       EXAM:  PHYSICAL EXAM:  There were no vitals taken for this visit.  Gen: Alert. No distress  Scalp with mild bitemporal recession with 1 mm fibers along the frontal hair line  Photos showing wheals on the trunk, some with annular and acuate morphology      Please do not hesitate to contact me if you have any questions/concerns.     Sincerely,       Sandie Ríos MD

## 2024-04-16 SDOH — HEALTH STABILITY: PHYSICAL HEALTH: ON AVERAGE, HOW MANY DAYS PER WEEK DO YOU ENGAGE IN MODERATE TO STRENUOUS EXERCISE (LIKE A BRISK WALK)?: 0 DAYS

## 2024-04-16 SDOH — HEALTH STABILITY: PHYSICAL HEALTH: ON AVERAGE, HOW MANY MINUTES DO YOU ENGAGE IN EXERCISE AT THIS LEVEL?: 0 MIN

## 2024-04-16 ASSESSMENT — PATIENT HEALTH QUESTIONNAIRE - PHQ9
10. IF YOU CHECKED OFF ANY PROBLEMS, HOW DIFFICULT HAVE THESE PROBLEMS MADE IT FOR YOU TO DO YOUR WORK, TAKE CARE OF THINGS AT HOME, OR GET ALONG WITH OTHER PEOPLE: NOT DIFFICULT AT ALL
SUM OF ALL RESPONSES TO PHQ QUESTIONS 1-9: 4
SUM OF ALL RESPONSES TO PHQ QUESTIONS 1-9: 4

## 2024-04-16 ASSESSMENT — SOCIAL DETERMINANTS OF HEALTH (SDOH): HOW OFTEN DO YOU GET TOGETHER WITH FRIENDS OR RELATIVES?: ONCE A WEEK

## 2024-04-17 ENCOUNTER — OFFICE VISIT (OUTPATIENT)
Dept: PEDIATRICS | Facility: CLINIC | Age: 49
End: 2024-04-17
Payer: COMMERCIAL

## 2024-04-17 VITALS
OXYGEN SATURATION: 99 % | RESPIRATION RATE: 16 BRPM | DIASTOLIC BLOOD PRESSURE: 86 MMHG | TEMPERATURE: 97.7 F | HEIGHT: 72 IN | BODY MASS INDEX: 28.31 KG/M2 | WEIGHT: 209 LBS | HEART RATE: 69 BPM | SYSTOLIC BLOOD PRESSURE: 126 MMHG

## 2024-04-17 DIAGNOSIS — G89.29 CHRONIC LOW BACK PAIN, UNSPECIFIED BACK PAIN LATERALITY, UNSPECIFIED WHETHER SCIATICA PRESENT: ICD-10-CM

## 2024-04-17 DIAGNOSIS — Z00.00 ROUTINE GENERAL MEDICAL EXAMINATION AT A HEALTH CARE FACILITY: Primary | ICD-10-CM

## 2024-04-17 DIAGNOSIS — Z13.6 CARDIOVASCULAR SCREENING; LDL GOAL LESS THAN 160: ICD-10-CM

## 2024-04-17 DIAGNOSIS — Z12.5 SCREENING FOR PROSTATE CANCER: ICD-10-CM

## 2024-04-17 DIAGNOSIS — J45.30 MILD PERSISTENT ASTHMA WITHOUT COMPLICATION: ICD-10-CM

## 2024-04-17 DIAGNOSIS — Z11.59 NEED FOR HEPATITIS C SCREENING TEST: ICD-10-CM

## 2024-04-17 DIAGNOSIS — L65.9 HAIR LOSS: ICD-10-CM

## 2024-04-17 DIAGNOSIS — M54.50 CHRONIC LOW BACK PAIN, UNSPECIFIED BACK PAIN LATERALITY, UNSPECIFIED WHETHER SCIATICA PRESENT: ICD-10-CM

## 2024-04-17 DIAGNOSIS — R68.82 DECREASED LIBIDO: ICD-10-CM

## 2024-04-17 PROCEDURE — 36415 COLL VENOUS BLD VENIPUNCTURE: CPT | Performed by: INTERNAL MEDICINE

## 2024-04-17 PROCEDURE — 84403 ASSAY OF TOTAL TESTOSTERONE: CPT | Performed by: INTERNAL MEDICINE

## 2024-04-17 PROCEDURE — 99396 PREV VISIT EST AGE 40-64: CPT | Performed by: INTERNAL MEDICINE

## 2024-04-17 PROCEDURE — 84270 ASSAY OF SEX HORMONE GLOBUL: CPT | Performed by: INTERNAL MEDICINE

## 2024-04-17 PROCEDURE — 86803 HEPATITIS C AB TEST: CPT | Performed by: INTERNAL MEDICINE

## 2024-04-17 PROCEDURE — G0103 PSA SCREENING: HCPCS | Performed by: INTERNAL MEDICINE

## 2024-04-17 PROCEDURE — 80053 COMPREHEN METABOLIC PANEL: CPT | Performed by: INTERNAL MEDICINE

## 2024-04-17 PROCEDURE — 80061 LIPID PANEL: CPT | Performed by: INTERNAL MEDICINE

## 2024-04-17 RX ORDER — FINASTERIDE 5 MG/1
TABLET, FILM COATED ORAL
Qty: 30 TABLET | Refills: 2 | Status: CANCELLED | OUTPATIENT
Start: 2024-04-17

## 2024-04-17 RX ORDER — FLUTICASONE PROPIONATE AND SALMETEROL 250; 50 UG/1; UG/1
1 POWDER RESPIRATORY (INHALATION) EVERY 12 HOURS
Qty: 3 EACH | Refills: 3 | Status: SHIPPED | OUTPATIENT
Start: 2024-04-17

## 2024-04-17 RX ORDER — GABAPENTIN 300 MG/1
CAPSULE ORAL
Qty: 270 CAPSULE | Refills: 3 | Status: SHIPPED | OUTPATIENT
Start: 2024-04-17

## 2024-04-17 RX ORDER — MINOXIDIL 2.5 MG/1
2.5 TABLET ORAL DAILY
Qty: 90 TABLET | Refills: 3 | Status: SHIPPED | OUTPATIENT
Start: 2024-04-17

## 2024-04-17 RX ORDER — FINASTERIDE 1 MG/1
1 TABLET, FILM COATED ORAL DAILY
Qty: 90 TABLET | Refills: 3 | Status: SHIPPED | OUTPATIENT
Start: 2024-04-17

## 2024-04-17 ASSESSMENT — PAIN SCALES - GENERAL: PAINLEVEL: MODERATE PAIN (4)

## 2024-04-17 NOTE — COMMUNITY RESOURCES LIST (ENGLISH)
April 17, 2024           YOUR PERSONALIZED LIST OF SERVICES & PROGRAMS           & RECREATION    Sports      [place] - Sports clubs and recreational activities - [place] [facility]  [address] [place] [address] (Distance: 2.6 miles)  Language: English  Fee: Self pay, Sliding scale      [facility] - Youth Enrichment  [address] [place] [address] (Distance: 6.9 miles)  Website: [email or web address]/  Language: English  Fee: Free  Accessibility: Ada accessible      [facility] - Adult Enrichment  Phone: ([phone]) [phone]  Website: https://qcue/adults-seniors/adult-enrichment/  Language: English  Hours: Mon 7:30 AM - 4:00 PM Tue 7:30 AM - 4:00 PM Wed 7:30 AM - 4:00 PM Thu 7:30 AM - 4:00 PM Fri 7:30 AM - 4:00 PM    Classes/Groups      of [facility] - Group fitness classes - [facility]  [address] [place] [address] (Distance: 2.6 miles)  Language: English  Fee: Free, Self pay, Sliding scale      [place] - Personal training  [address] [place] [address] (Distance: 2.6 miles)  Language: English      [facility] - Adult Enrichment  Phone: ([phone]) [phone]  Website: https://qcue/adults-seniors/adult-enrichment/  Language: English  Hours: Mon 7:30 AM - 4:00 PM Tue 7:30 AM - 4:00 PM Wed 7:30 AM - 4:00 PM Thu 7:30 AM - 4:00 PM Fri 7:30 AM - 4:00 PM               IMPORTANT NUMBERS & WEBSITES        Emergency Services  911  .   United Way  211 http://211unitedway.org  .   Poison Control  (719) 420-7772 http://[place]poison.org http://wis[place]poison.org  .     Suicide and Crisis Lifeline  988 http://988lifeline.org  .   Childhelp National Child Abuse Hotline  988.406.5507 http://Childhelphotline.org   .   National Sexual Assault Hotline  (427) 064-5565 (HOPE) http://Rainn.org   .     National Runaway Safeline  (106) 873-4610 (RUNAWAY) http://CloudWalkrunahaystagg.org  .   Pregnancy &  Postpartum Support  Call/text 350-818-0345  [place]: http://ppsupportmn.org  [place]: http://Kromek.com/wi  .   Substance Abuse National Helpline ([facility])  108-065-HELP (1509) http://Findtreatment.gov   .                DISCLAIMER: These resources have been generated via the [employer] Platform. [employer] does not endorse any service providers mentioned in this resource list. [employer] does not guarantee that the services mentioned in this resource list will be available to you or will improve your health or wellness.    [facility]
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
## 2024-04-17 NOTE — PATIENT INSTRUCTIONS
Preventive Care Advice   This is general advice given by our system to help you stay healthy. However, your care team may have specific advice just for you. Please talk to your care team about your preventive care needs.  Nutrition  Eat 5 or more servings of fruits and vegetables each day.  Try wheat bread, brown rice and whole grain pasta (instead of white bread, rice, and pasta).  Get enough calcium and vitamin D. Check the label on foods and aim for 100% of the RDA (recommended daily allowance).  Lifestyle  Exercise at least 150 minutes each week   (30 minutes a day, 5 days a week).  Do muscle strengthening activities 2 days a week. These help control your weight and prevent disease.  No smoking.  Wear sunscreen to prevent skin cancer.  Have a dental exam and cleaning every 6 months.  Yearly exams  See your health care team every year to talk about:  Any changes in your health.  Any medicines your care team has prescribed.  Preventive care, family planning, and ways to prevent chronic diseases.  Shots (vaccines)   HPV shots (up to age 26), if you've never had them before.  Hepatitis B shots (up to age 59), if you've never had them before.  COVID-19 shot: Get this shot when it's due.  Flu shot: Get a flu shot every year.  Tetanus shot: Get a tetanus shot every 10 years.  Pneumococcal, hepatitis A, and RSV shots: Ask your care team if you need these based on your risk.  Shingles shot (for age 50 and up).  General health tests  Diabetes screening:  Starting at age 35, Get screened for diabetes at least every 3 years.  If you are younger than age 35, ask your care team if you should be screened for diabetes.  Cholesterol test: At age 39, start having a cholesterol test every 5 years, or more often if advised.  Bone density scan (DEXA): At age 50, ask your care team if you should have this scan for osteoporosis (brittle bones).  Hepatitis C: Get tested at least once in your life.  STIs (sexually transmitted  infections)  Before age 24: Ask your care team if you should be screened for STIs.  After age 24: Get screened for STIs if you're at risk. You are at risk for STIs (including HIV) if:  You are sexually active with more than one person.  You don't use condoms every time.  You or a partner was diagnosed with a sexually transmitted infection.  If you are at risk for HIV, ask about PrEP medicine to prevent HIV.  Get tested for HIV at least once in your life, whether you are at risk for HIV or not.  Cancer screening tests  Cervical cancer screening: If you have a cervix, begin getting regular cervical cancer screening tests at age 21. Most people who have regular screenings with normal results can stop after age 65. Talk about this with your provider.  Breast cancer scan (mammogram): If you've ever had breasts, begin having regular mammograms starting at age 40. This is a scan to check for breast cancer.  Colon cancer screening: It is important to start screening for colon cancer at age 45.  Have a colonoscopy test every 10 years (or more often if you're at risk) Or, ask your provider about stool tests like a FIT test every year or Cologuard test every 3 years.  To learn more about your testing options, visit: https://www.[employer]/644414.pdf.  For help making a decision, visit: https://bit.ly/vc37997.  Prostate cancer screening test: If you have a prostate and are age 55 to 69, ask your provider if you would benefit from a yearly prostate cancer screening test.  Lung cancer screening: If you are a current or former smoker age 50 to 80, ask your care team if ongoing lung cancer screenings are right for you.  For informational purposes only. Not to replace the advice of your health care provider. Copyright   2023 [employer]. All rights reserved. Clinically reviewed by the [facility] Transitions Program. [employer] [id number] - REV 01/24.

## 2024-04-18 LAB
ALBUMIN SERPL BCG-MCNC: 4.4 G/DL (ref 3.5–5.2)
ALP SERPL-CCNC: 50 U/L (ref 40–150)
ALT SERPL W P-5'-P-CCNC: 23 U/L (ref 0–70)
ANION GAP SERPL CALCULATED.3IONS-SCNC: 10 MMOL/L (ref 7–15)
AST SERPL W P-5'-P-CCNC: 23 U/L (ref 0–45)
BILIRUB SERPL-MCNC: 0.6 MG/DL
BUN SERPL-MCNC: 21.1 MG/DL (ref 6–20)
CALCIUM SERPL-MCNC: 9.4 MG/DL (ref 8.6–10)
CHLORIDE SERPL-SCNC: 106 MMOL/L (ref 98–107)
CHOLEST SERPL-MCNC: 223 MG/DL
CREAT SERPL-MCNC: 0.96 MG/DL (ref 0.67–1.17)
DEPRECATED HCO3 PLAS-SCNC: 23 MMOL/L (ref 22–29)
EGFRCR SERPLBLD CKD-EPI 2021: >90 ML/MIN/1.73M2
FASTING STATUS PATIENT QL REPORTED: YES
GLUCOSE SERPL-MCNC: 103 MG/DL (ref 70–99)
HDLC SERPL-MCNC: 58 MG/DL
LDLC SERPL CALC-MCNC: 151 MG/DL
NONHDLC SERPL-MCNC: 165 MG/DL
POTASSIUM SERPL-SCNC: 4.6 MMOL/L (ref 3.4–5.3)
PROT SERPL-MCNC: 7 G/DL (ref 6.4–8.3)
SODIUM SERPL-SCNC: 139 MMOL/L (ref 135–145)
TRIGL SERPL-MCNC: 71 MG/DL

## 2024-04-19 LAB
HCV AB SERPL QL IA: NONREACTIVE
SHBG SERPL-SCNC: 68 NMOL/L (ref 11–80)

## 2024-04-20 LAB
PSA SERPL DL<=0.01 NG/ML-MCNC: 0.99 NG/ML (ref 0–2.5)
TESTOST FREE SERPL-MCNC: 7.33 NG/DL
TESTOST SERPL-MCNC: 569 NG/DL (ref 240–950)

## 2024-06-21 ENCOUNTER — ANCILLARY PROCEDURE (OUTPATIENT)
Dept: GENERAL RADIOLOGY | Facility: CLINIC | Age: 49
End: 2024-06-21
Attending: STUDENT IN AN ORGANIZED HEALTH CARE EDUCATION/TRAINING PROGRAM
Payer: COMMERCIAL

## 2024-06-21 ENCOUNTER — OFFICE VISIT (OUTPATIENT)
Dept: ORTHOPEDICS | Facility: CLINIC | Age: 49
End: 2024-06-21
Payer: COMMERCIAL

## 2024-06-21 DIAGNOSIS — M23.92 INTERNAL DERANGEMENT OF LEFT KNEE: Primary | ICD-10-CM

## 2024-06-21 DIAGNOSIS — S89.92XA INJURY OF LEFT KNEE, INITIAL ENCOUNTER: ICD-10-CM

## 2024-06-21 DIAGNOSIS — S89.92XA INJURY OF LEFT KNEE, INITIAL ENCOUNTER: Primary | ICD-10-CM

## 2024-06-21 PROCEDURE — 99204 OFFICE O/P NEW MOD 45 MIN: CPT | Performed by: STUDENT IN AN ORGANIZED HEALTH CARE EDUCATION/TRAINING PROGRAM

## 2024-06-21 PROCEDURE — 73562 X-RAY EXAM OF KNEE 3: CPT | Mod: LT | Performed by: RADIOLOGY

## 2024-06-21 NOTE — LETTER
6/21/2024      RE: Romario Lizarraga  [address]  [facility] [address]     Dear Colleague,    Thank you for referring your patient, Romario Lizarraga, to the [facility] [place]. Please see a copy of my visit note below.    [facility]  Sports Medicine Clinic  Clinics and Surgery Center           SUBJECTIVE       Romario Lizarraga is a 48 year old male presenting to clinic today with concern for left knee pain.    Background:   Occupation:     Injury (Y/N): Yes  Work Comp (Y/N): no  Date of injury: Mid May 2024  Mechanism of Injury: planted leg while kicking a soccer ball and felt a sharp pain in his knee    Duration of symptoms: 1 month   Intensity (1-10): 8/10   Aggravating factors: walking or weight bearing for longer periods   Relieving Factors: Rest   Prior Evaluation: None   Previous Surgery on the area (Y/N): None   Physical Therapy (Previous/Current/None): None    Physical Activity/Exercise (What, How Often): , hiking      PMH, Medications and Allergies were reviewed and updated as needed.    ROS:  As noted above otherwise negative.    Patient Active Problem List   Diagnosis     CARDIOVASCULAR SCREENING; LDL GOAL LESS THAN 160     Seborrheic dermatitis     Mild persistent asthma without complication     Chronic low back pain, unspecified back pain laterality, unspecified whether sciatica present     Chronic pain in testicle     Decreased libido       Current Outpatient Medications   Medication Sig Dispense Refill     albuterol (PROAIR HFA/PROVENTIL HFA/VENTOLIN HFA) 108 (90 Base) MCG/ACT inhaler Inhale 2 puffs into the lungs every 4 hours as needed for shortness of breath / dyspnea 3 Inhaler 3     finasteride (PROPECIA) 1 MG tablet Take 1 tablet (1 mg) by mouth daily 90 tablet 3     finasteride (PROSCAR) 5 MG tablet Take 1/4 tab daily 30 tablet 2     Fluticasone-Salmeterol (ADVAIR HFA IN)        fluticasone-salmeterol (ADVAIR)  250-50 MCG/ACT inhaler Inhale 1 puff into the lungs every 12 hours 3 each 3     gabapentin (NEURONTIN) 300 MG capsule TAKE ONE CAPSULE BY MOUTH THREE TIMES DAILY AS NEEDED 270 capsule 3     ibuprofen (ADVIL/MOTRIN) 200 MG tablet Take 800 mg by mouth every 4 hours as needed for mild pain       minoxidil (LONITEN) 2.5 MG tablet Take 1 tablet (2.5 mg) by mouth daily 90 tablet 3     sildenafil (VIAGRA) 50 MG tablet TAKE ONE TO TWO TABLETS BY MOUTH DAILY AS NEEDED  FOR ED 60 tablet 0            OBJECTIVE:       Vitals: There were no vitals filed for this visit.  BMI: There is no height or weight on file to calculate BMI.    Gen:  Well nourished and in no acute distress  HEENT: Extraocular movement intact  Neck: Supple  Pulm:  Breathing Comfortably. No increased respiratory effort.  Psych: Euthymic. Appropriately answers questions    MSK: Knee with minimal effusion.  No overlying warmth or redness.  Full range of motion without crepitus.  No joint line tenderness or MCL/LCL tenderness.  No quad or patellar tendon tenderness.  No tibial spine tenderness.  Negative Lachman and posterior drawer.  Negative varus or valgus stress testing.  Positive medial sided Luis and Apley grind.  Negative patellar apprehension or compression.      XRAY : Independent evaluation of the left knee shows minimal degenerative changes.  Tibial spine widening suggestive of acute avulsion injury, although this is more chronic as the patient is not having any pain there when correlated to physical exam, leading to more of a diagnosis of a previous Osgood slaughters as well as the previous degloving injury that the patient had when looking at the MRI of prior.          ASSESSMENT and PLAN:     Romario was seen today for pain.    Diagnoses and all orders for this visit:    Internal derangement of left knee  -     MR Knee Left w/o Contrast; Future      Is a 48-year-old male with 1 month history of left knee pain after twisting injury while playing  soccer.  His physical exam is concerning for internal derangement, namely of the meniscus of the left knee.  The patient is exhibiting pain and instability sensations.  Have discussed with the patient the next steps in management.  At this point would recommend an MRI for further evaluation.  The instability could be coming from a root tear of the meniscus, and the patient does not have substantial evidence of arthritic changes on x-ray, so his ongoing management is pending the MRI.  He can continue to wear his knee brace/sleeve for comfort.  Activity restrictions have been discussed in detail.  We will have the patient return either in person, by phone, or virtually after MRI has resulted to discuss ongoing recommendations.  Return precautions have been counseled.    It was a pleasure seeing Romario today    Options for treatment and/or follow-up care were reviewed with the patient was actively involved in the decision making process. Patient verbalized understanding and was in agreement with the plan.    Rafael Og DO  , Sports Medicine  Department of Family [facility] and [facility]      Again, thank you for allowing me to participate in the care of your patient.      Sincerely,    Rafael Og DO

## 2024-06-21 NOTE — PROGRESS NOTES
[facility]  Sports Medicine Clinic  Clinics and Surgery Center           SUBJECTIVE       Romario Lizarraga is a 48 year old male presenting to clinic today with concern for left knee pain.    Background:   Occupation:     Injury (Y/N): Yes  Work Comp (Y/N): no  Date of injury: Mid May 2024  Mechanism of Injury: planted leg while kicking a soccer ball and felt a sharp pain in his knee    Duration of symptoms: 1 month   Intensity (1-10): 8/10   Aggravating factors: walking or weight bearing for longer periods   Relieving Factors: Rest   Prior Evaluation: None   Previous Surgery on the area (Y/N): None   Physical Therapy (Previous/Current/None): None    Physical Activity/Exercise (What, How Often): , hiking      PMH, Medications and Allergies were reviewed and updated as needed.    ROS:  As noted above otherwise negative.    Patient Active Problem List   Diagnosis    CARDIOVASCULAR SCREENING; LDL GOAL LESS THAN 160    Seborrheic dermatitis    Mild persistent asthma without complication    Chronic low back pain, unspecified back pain laterality, unspecified whether sciatica present    Chronic pain in testicle    Decreased libido       Current Outpatient Medications   Medication Sig Dispense Refill    albuterol (PROAIR HFA/PROVENTIL HFA/VENTOLIN HFA) 108 (90 Base) MCG/ACT inhaler Inhale 2 puffs into the lungs every 4 hours as needed for shortness of breath / dyspnea 3 Inhaler 3    finasteride (PROPECIA) 1 MG tablet Take 1 tablet (1 mg) by mouth daily 90 tablet 3    finasteride (PROSCAR) 5 MG tablet Take 1/4 tab daily 30 tablet 2    Fluticasone-Salmeterol (ADVAIR HFA IN)       fluticasone-salmeterol (ADVAIR) 250-50 MCG/ACT inhaler Inhale 1 puff into the lungs every 12 hours 3 each 3    gabapentin (NEURONTIN) 300 MG capsule TAKE ONE CAPSULE BY MOUTH THREE TIMES DAILY AS NEEDED 270 capsule 3    ibuprofen (ADVIL/MOTRIN) 200 MG tablet Take 800 mg by mouth every 4 hours as needed for mild pain       minoxidil (LONITEN) 2.5 MG tablet Take 1 tablet (2.5 mg) by mouth daily 90 tablet 3    sildenafil (VIAGRA) 50 MG tablet TAKE ONE TO TWO TABLETS BY MOUTH DAILY AS NEEDED  FOR ED 60 tablet 0            OBJECTIVE:       Vitals: There were no vitals filed for this visit.  BMI: There is no height or weight on file to calculate BMI.    Gen:  Well nourished and in no acute distress  HEENT: Extraocular movement intact  Neck: Supple  Pulm:  Breathing Comfortably. No increased respiratory effort.  Psych: Euthymic. Appropriately answers questions    MSK: Knee with minimal effusion.  No overlying warmth or redness.  Full range of motion without crepitus.  No joint line tenderness or MCL/LCL tenderness.  No quad or patellar tendon tenderness.  No tibial spine tenderness.  Negative Lachman and posterior drawer.  Negative varus or valgus stress testing.  Positive medial sided Luis and Apley grind.  Negative patellar apprehension or compression.      XRAY : Independent evaluation of the left knee shows minimal degenerative changes.  Tibial spine widening suggestive of acute avulsion injury, although this is more chronic as the patient is not having any pain there when correlated to physical exam, leading to more of a diagnosis of a previous Osgood slaughters as well as the previous degloving injury that the patient had when looking at the MRI of prior.          ASSESSMENT and PLAN:     Romario was seen today for pain.    Diagnoses and all orders for this visit:    Internal derangement of left knee  -     MR Knee Left w/o Contrast; Future      Is a 48-year-old male with 1 month history of left knee pain after twisting injury while playing soccer.  His physical exam is concerning for internal derangement, namely of the meniscus of the left knee.  The patient is exhibiting pain and instability sensations.  Have discussed with the patient the next steps in management.  At this point would recommend an MRI for further evaluation.  The  instability could be coming from a root tear of the meniscus, and the patient does not have substantial evidence of arthritic changes on x-ray, so his ongoing management is pending the MRI.  He can continue to wear his knee brace/sleeve for comfort.  Activity restrictions have been discussed in detail.  We will have the patient return either in person, by phone, or virtually after MRI has resulted to discuss ongoing recommendations.  Return precautions have been counseled.    It was a pleasure seeing Romario today    Options for treatment and/or follow-up care were reviewed with the patient was actively involved in the decision making process. Patient verbalized understanding and was in agreement with the plan.    Rafael Og DO  , Sports Medicine  Department of Family [facility] and [facility]

## 2024-07-08 ENCOUNTER — HOSPITAL ENCOUNTER (OUTPATIENT)
Dept: MRI IMAGING | Facility: CLINIC | Age: 49
Discharge: HOME | End: 2024-07-08
Attending: STUDENT IN AN ORGANIZED HEALTH CARE EDUCATION/TRAINING PROGRAM | Admitting: STUDENT IN AN ORGANIZED HEALTH CARE EDUCATION/TRAINING PROGRAM
Payer: COMMERCIAL

## 2024-07-08 DIAGNOSIS — M23.92 INTERNAL DERANGEMENT OF LEFT KNEE: ICD-10-CM

## 2024-07-08 PROCEDURE — 73721 MRI JNT OF LWR EXTRE W/O DYE: CPT | Mod: 26 | Performed by: RADIOLOGY

## 2024-07-08 PROCEDURE — 73721 MRI JNT OF LWR EXTRE W/O DYE: CPT | Mod: LT

## 2024-07-10 ENCOUNTER — TELEPHONE (OUTPATIENT)
Dept: ORTHOPEDICS | Facility: CLINIC | Age: 49
End: 2024-07-10
Payer: COMMERCIAL

## 2024-07-10 ENCOUNTER — MYC MEDICAL ADVICE (OUTPATIENT)
Dept: ORTHOPEDICS | Facility: CLINIC | Age: 49
End: 2024-07-10
Payer: COMMERCIAL

## 2024-07-10 NOTE — TELEPHONE ENCOUNTER
Left Voicemail (1st Attempt) and Sent Mychart (1st Attempt) for the patient to call back and schedule the following:    Appointment type: TELEPHONE VISIT RETURN  Provider: Dr. Rubens Og  Return date: Next Available

## 2024-07-15 ENCOUNTER — TELEPHONE (OUTPATIENT)
Dept: ORTHOPEDICS | Facility: CLINIC | Age: 49
End: 2024-07-15
Payer: COMMERCIAL

## 2024-07-15 NOTE — TELEPHONE ENCOUNTER
Patient confirmed scheduled appointment:  Date: 7/18/24  Time: 9:20am  Visit type: Tele return  Provider: Dr Og  [place] [facility] [place]

## 2024-07-17 NOTE — PROGRESS NOTES
[facility]  Sports Medicine Clinic  Clinics and Surgery Center           SUBJECTIVE       Romario Lizarraga is a 48 year old male presenting to clinic today for follow-up of the right knee, via telephone.  Patient is overall doing better, he has a baseline pain of about 3.  He still having problems with bending the knee deep into flexion.  Also having trouble with squatting.  He is not having instability.  He is currently camping, and overall noticing mild improvement.  MRI was obtained.    6/21/24: Internal derangement of left knee  -     MR Knee Left w/o Contrast; Future        Is a 48-year-old male with 1 month history of left knee pain after twisting injury while playing soccer.  His physical exam is concerning for internal derangement, namely of the meniscus of the left knee.  The patient is exhibiting pain and instability sensations.  Have discussed with the patient the next steps in management.  At this point would recommend an MRI for further evaluation.  The instability could be coming from a root tear of the meniscus, and the patient does not have substantial evidence of arthritic changes on x-ray, so his ongoing management is pending the MRI.  He can continue to wear his knee brace/sleeve for comfort.  Activity restrictions have been discussed in detail.  We will have the patient return either in person, by phone, or virtually after MRI has resulted to discuss ongoing recommendations.  Return precautions have been counseled.    PMH, Medications and Allergies were reviewed and updated as needed.    ROS:  As noted above otherwise negative.    Patient Active Problem List   Diagnosis    CARDIOVASCULAR SCREENING; LDL GOAL LESS THAN 160    Seborrheic dermatitis    Mild persistent asthma without complication    Chronic low back pain, unspecified back pain laterality, unspecified whether sciatica present    Chronic pain in testicle    Decreased libido       Current Outpatient Medications    Medication Sig Dispense Refill    albuterol (PROAIR HFA/PROVENTIL HFA/VENTOLIN HFA) 108 (90 Base) MCG/ACT inhaler Inhale 2 puffs into the lungs every 4 hours as needed for shortness of breath / dyspnea 3 Inhaler 3    finasteride (PROPECIA) 1 MG tablet Take 1 tablet (1 mg) by mouth daily 90 tablet 3    Fluticasone-Salmeterol (ADVAIR HFA IN)       fluticasone-salmeterol (ADVAIR) 250-50 MCG/ACT inhaler Inhale 1 puff into the lungs every 12 hours 3 each 3    gabapentin (NEURONTIN) 300 MG capsule TAKE ONE CAPSULE BY MOUTH THREE TIMES DAILY AS NEEDED 270 capsule 3    ibuprofen (ADVIL/MOTRIN) 200 MG tablet Take 800 mg by mouth every 4 hours as needed for mild pain      minoxidil (LONITEN) 2.5 MG tablet Take 1 tablet (2.5 mg) by mouth daily 90 tablet 3    sildenafil (VIAGRA) 50 MG tablet TAKE ONE TO TWO TABLETS BY MOUTH DAILY AS NEEDED  FOR ED 60 tablet 0    finasteride (PROSCAR) 5 MG tablet Take 1/4 tab daily 30 tablet 2            OBJECTIVE:       Vitals: There were no vitals filed for this visit.  BMI: There is no height or weight on file to calculate BMI.    Physical exam deferred as this was a telephone visit    XR Knee Left 3 Views  Order date: 6/21/2024  Authorizing: Rafael Og,   Final Result. Last edit: Kleber, Radiant Ib - 6/21/2024     Narrative & Impression    3 views left knee radiographs 6/21/2024 2:51 PM     History: Injury of left knee, initial encounter      Comparison: 6/26/2016, 6/27/2016     Findings:     AP view of bilateral knees, and lateral and patellofemoral views of  the left knee were obtained.      No acute osseous abnormality.  No joint effusion.     No substantial degenerative change. Stable chronic ossicle proximal to  the tibial tuberosity.     No patellar tilt or lateral subluxation. Soft tissue is unremarkable.                                                                      Impression:  1. No acute osseous abnormality.  2. No substantial degenerative change.      MR KNEE  LEFT W/O CONTRAST  7/8/2024  [facility]  Results    Procedure Component Value Ref Range Date/Time   MR Knee Left w/o Contrast [[id number]] Resulted: 07/08/24 1423   Order Status: Completed Updated: 07/08/24 1424   Narrative:     MR left knee without contrast 7/8/2024 8:41 AM    Techniques: Multiplanar multisequence imaging of the left knee was  obtained without administration of intra-articular or intravenous  contrast using routing protocol.    History: Meniscal disruption; Internal derangement of left knee    Additional History from EMR: Injury planting leg well kicking a soccer  ball in mid May. Positive medial Luis and Apley grind on exam.    Comparison: 6/27/2016 knee MRI    Findings:    MENISCI:  Medial meniscus: Intact.  Lateral meniscus: Intact.    LIGAMENTS  Cruciate ligaments: Mildly increased intrasubstance signal in the  distal ACL.  Medial supporting structures: Intact.  Lateral supporting structures: Intact.    EXTENSOR MECHANISM  Intact. Edema deep and superficial to the distal patellar tendon.  Superolateral Hoffa's fat pad edema.    FLUID  Trace joint effusion.    OSSEOUS and ARTICULAR STRUCTURES  Bones: Unfused apophysis with bony fragmentation at the tibial  tubercle. Edema within the apophysis.    Patellofemoral compartment: Full-thickness cartilage ulcerations with  associated bone marrow edema at the lateral patellar facet and the  lateral aspect of the trochlea. Focal fissuring at the median ridge  facet with focal bone marrow hyperintensity.    Medial compartment: Multifocal superficial fissuring.    Lateral compartment: Multifocal superficial fissuring.    ANCILLARY FINDINGS  Varicosities along the anterior medial subcutaneous tissues. Reactive  edema in the fatty tissues in the intracondylar notch.   Impression:     Impression:  1. Grade 4 chondromalacia of the patellofemoral joint, progressed  compared to 6/27/2016.  2. Superior-lateral Hoffa's fat pad  edema and trochlear cartilage  abnormalities with edema in the trochlea, consistent with patellar  maltracking.  3. Edema deep and superficial to the distal patellar tendon with  associated edema and the unfused apophysis consistent with  Osgood-Schlatter disease.  4. Mild increased intrasubstance signal within the ACL, likely  sequelae of prior low-grade sprain.          ASSESSMENT and PLAN:     Diagnoses and all orders for this visit:    Osteoarthritis of left patellofemoral joint  -     Physical Therapy  Referral; Future    Impingement syndrome involving patellar fat pad of left knee  -     Physical Therapy  Referral; Future    Patellar maltracking, left  -     Physical Therapy  Referral; Future      Romario is a very pleasant 48-year-old male presenting to clinic today for follow-up via telephone to discuss the findings on his MRI.  I had a long discussion with Romario in terms of the pathology that was seen on his MRI, consistent with that of patellar maltracking and fat pad impingement likely secondary to biomechanics.  Because this is also led to grade IV chondromalacia/patellofemoral OA of the left knee joint.  Patient does have a baseline pain of about 3, and is having some difficulty with squatting and performing hobbies he enjoys.  Because of that, I do recommend biomechanical retraining and strengthening with physical therapy.  He is amenable to this.  Order has been placed.  Because of the pain, I also recommend a corticosteroid injection over the coming weeks for hopeful successful resolution of his pain patterns.  Thereafter, I would like to see him 6 weeks after starting physical therapy to assess his progress.  All questions have been answered.    Total telephone time: 12 minutes    Options for treatment and/or follow-up care were reviewed with the patient was actively involved in the decision making process. Patient verbalized understanding and was in agreement with the  plan.    Rafael Og DO  , Sports Medicine  Department of Family Medicine and [facility]

## 2024-07-18 ENCOUNTER — VIRTUAL VISIT (OUTPATIENT)
Dept: ORTHOPEDICS | Facility: CLINIC | Age: 49
End: 2024-07-18
Payer: COMMERCIAL

## 2024-07-18 DIAGNOSIS — M17.12 OSTEOARTHRITIS OF LEFT PATELLOFEMORAL JOINT: Primary | ICD-10-CM

## 2024-07-18 DIAGNOSIS — M22.8X2 PATELLAR MALTRACKING, LEFT: ICD-10-CM

## 2024-07-18 DIAGNOSIS — M25.862 IMPINGEMENT SYNDROME INVOLVING PATELLAR FAT PAD OF LEFT KNEE: ICD-10-CM

## 2024-07-18 PROCEDURE — 99442 PR PHYSICIAN TELEPHONE EVALUATION 11-20 MIN: CPT | Performed by: STUDENT IN AN ORGANIZED HEALTH CARE EDUCATION/TRAINING PROGRAM

## 2024-07-18 NOTE — LETTER
7/18/2024       RE: Romario Lizarraga  [address]  [place] [address]     Dear Colleague,    Thank you for referring your patient, Romario Lizarraga, to the [facility] SPORTS MEDICINE CLINIC [place] [facility]. Please see a copy of my visit note below.    [facility]  Sports Medicine Clinic  Clinics and Surgery Center           SUBJECTIVE       Romario Lizarraga is a 48 year old male presenting to clinic today for follow-up of the right knee, via telephone.  Patient is overall doing better, he has a baseline pain of about 3.  He still having problems with bending the knee deep into flexion.  Also having trouble with squatting.  He is not having instability.  He is currently camping, and overall noticing mild improvement.  MRI was obtained.    6/21/24: Internal derangement of left knee  -     MR Knee Left w/o Contrast; Future        Is a 48-year-old male with 1 month history of left knee pain after twisting injury while playing soccer.  His physical exam is concerning for internal derangement, namely of the meniscus of the left knee.  The patient is exhibiting pain and instability sensations.  Have discussed with the patient the next steps in management.  At this point would recommend an MRI for further evaluation.  The instability could be coming from a root tear of the meniscus, and the patient does not have substantial evidence of arthritic changes on x-ray, so his ongoing management is pending the MRI.  He can continue to wear his knee brace/sleeve for comfort.  Activity restrictions have been discussed in detail.  We will have the patient return either in person, by phone, or virtually after MRI has resulted to discuss ongoing recommendations.  Return precautions have been counseled.    PMH, Medications and Allergies were reviewed and updated as needed.    ROS:  As noted above otherwise negative.    Patient Active  Problem List   Diagnosis     CARDIOVASCULAR SCREENING; LDL GOAL LESS THAN 160     Seborrheic dermatitis     Mild persistent asthma without complication     Chronic low back pain, unspecified back pain laterality, unspecified whether sciatica present     Chronic pain in testicle     Decreased libido       Current Outpatient Medications   Medication Sig Dispense Refill     albuterol (PROAIR HFA/PROVENTIL HFA/VENTOLIN HFA) 108 (90 Base) MCG/ACT inhaler Inhale 2 puffs into the lungs every 4 hours as needed for shortness of breath / dyspnea 3 Inhaler 3     finasteride (PROPECIA) 1 MG tablet Take 1 tablet (1 mg) by mouth daily 90 tablet 3     Fluticasone-Salmeterol (ADVAIR HFA IN)        fluticasone-salmeterol (ADVAIR) 250-50 MCG/ACT inhaler Inhale 1 puff into the lungs every 12 hours 3 each 3     gabapentin (NEURONTIN) 300 MG capsule TAKE ONE CAPSULE BY MOUTH THREE TIMES DAILY AS NEEDED 270 capsule 3     ibuprofen (ADVIL/MOTRIN) 200 MG tablet Take 800 mg by mouth every 4 hours as needed for mild pain       minoxidil (LONITEN) 2.5 MG tablet Take 1 tablet (2.5 mg) by mouth daily 90 tablet 3     sildenafil (VIAGRA) 50 MG tablet TAKE ONE TO TWO TABLETS BY MOUTH DAILY AS NEEDED  FOR ED 60 tablet 0     finasteride (PROSCAR) 5 MG tablet Take 1/4 tab daily 30 tablet 2            OBJECTIVE:       Vitals: There were no vitals filed for this visit.  BMI: There is no height or weight on file to calculate BMI.    Physical exam deferred as this was a telephone visit    XR Knee Left 3 Views  Order date: 6/21/2024  Authorizing: Rafael Og,   Final Result. Last edit: Interface, Radiant Ib - 6/21/2024     Narrative & Impression    3 views left knee radiographs 6/21/2024 2:51 PM     History: Injury of left knee, initial encounter      Comparison: 6/26/2016, 6/27/2016     Findings:     AP view of bilateral knees, and lateral and patellofemoral views of  the left knee were obtained.      No acute osseous abnormality.  No joint  effusion.     No substantial degenerative change. Stable chronic ossicle proximal to  the tibial tuberosity.     No patellar tilt or lateral subluxation. Soft tissue is unremarkable.                                                                      Impression:  1. No acute osseous abnormality.  2. No substantial degenerative change.      MR KNEE LEFT W/O CONTRAST  7/8/2024  [facility]  Results    Procedure Component Value Ref Range Date/Time   MR Knee Left w/o Contrast [[id number]] Resulted: 07/08/24 1423   Order Status: Completed Updated: 07/08/24 1424   Narrative:     MR left knee without contrast 7/8/2024 8:41 AM    Techniques: Multiplanar multisequence imaging of the left knee was  obtained without administration of intra-articular or intravenous  contrast using routing protocol.    History: Meniscal disruption; Internal derangement of left knee    Additional History from EMR: Injury planting leg well kicking a soccer  ball in mid May. Positive medial Luis and Apley grind on exam.    Comparison: 6/27/2016 knee MRI    Findings:    MENISCI:  Medial meniscus: Intact.  Lateral meniscus: Intact.    LIGAMENTS  Cruciate ligaments: Mildly increased intrasubstance signal in the  distal ACL.  Medial supporting structures: Intact.  Lateral supporting structures: Intact.    EXTENSOR MECHANISM  Intact. Edema deep and superficial to the distal patellar tendon.  Superolateral Hoffa's fat pad edema.    FLUID  Trace joint effusion.    OSSEOUS and ARTICULAR STRUCTURES  Bones: Unfused apophysis with bony fragmentation at the tibial  tubercle. Edema within the apophysis.    Patellofemoral compartment: Full-thickness cartilage ulcerations with  associated bone marrow edema at the lateral patellar facet and the  lateral aspect of the trochlea. Focal fissuring at the median ridge  facet with focal bone marrow hyperintensity.    Medial compartment: Multifocal superficial fissuring.    Lateral  compartment: Multifocal superficial fissuring.    ANCILLARY FINDINGS  Varicosities along the anterior medial subcutaneous tissues. Reactive  edema in the fatty tissues in the intracondylar notch.   Impression:     Impression:  1. Grade 4 chondromalacia of the patellofemoral joint, progressed  compared to 6/27/2016.  2. Superior-lateral Hoffa's fat pad edema and trochlear cartilage  abnormalities with edema in the trochlea, consistent with patellar  maltracking.  3. Edema deep and superficial to the distal patellar tendon with  associated edema and the unfused apophysis consistent with  Osgood-Schlatter disease.  4. Mild increased intrasubstance signal within the ACL, likely  sequelae of prior low-grade sprain.          ASSESSMENT and PLAN:     Diagnoses and all orders for this visit:    Osteoarthritis of left patellofemoral joint  -     Physical Therapy  Referral; Future    Impingement syndrome involving patellar fat pad of left knee  -     Physical Therapy  Referral; Future    Patellar maltracking, left  -     Physical Therapy  Referral; Future      Romario is a very pleasant 48-year-old male presenting to clinic today for follow-up via telephone to discuss the findings on his MRI.  I had a long discussion with Romario in terms of the pathology that was seen on his MRI, consistent with that of patellar maltracking and fat pad impingement likely secondary to biomechanics.  Because this is also led to grade IV chondromalacia/patellofemoral OA of the left knee joint.  Patient does have a baseline pain of about 3, and is having some difficulty with squatting and performing hobbies he enjoys.  Because of that, I do recommend biomechanical retraining and strengthening with physical therapy.  He is amenable to this.  Order has been placed.  Because of the pain, I also recommend a corticosteroid injection over the coming weeks for hopeful successful resolution of his pain patterns.  Thereafter, I would  like to see him 6 weeks after starting physical therapy to assess his progress.  All questions have been answered.    Total telephone time: 12 minutes    Options for treatment and/or follow-up care were reviewed with the patient was actively involved in the decision making process. Patient verbalized understanding and was in agreement with the plan.    Rafael Og DO  , Sports Medicine  Department of Family [facility] and [facility]      Again, thank you for allowing me to participate in the care of your patient.      Sincerely,    Rafael Og DO

## 2024-07-30 ASSESSMENT — ACTIVITIES OF DAILY LIVING (ADL)
SWELLING: THE SYMPTOM AFFECTS MY ACTIVITY SLIGHTLY
AS_A_RESULT_OF_YOUR_KNEE_INJURY,_HOW_WOULD_YOU_RATE_YOUR_CURRENT_LEVEL_OF_DAILY_ACTIVITY?: ABNORMAL
PAIN: THE SYMPTOM AFFECTS MY ACTIVITY MODERATELY
GIVING WAY, BUCKLING OR SHIFTING OF KNEE: I HAVE THE SYMPTOM BUT IT DOES NOT AFFECT MY ACTIVITY
HOW_WOULD_YOU_RATE_THE_OVERALL_FUNCTION_OF_YOUR_KNEE_DURING_YOUR_USUAL_DAILY_ACTIVITIES?: ABNORMAL
SIT WITH YOUR KNEE BENT: ACTIVITY IS FAIRLY DIFFICULT
LIMPING: THE SYMPTOM AFFECTS MY ACTIVITY MODERATELY
WALK: ACTIVITY IS FAIRLY DIFFICULT
SQUAT: ACTIVITY IS FAIRLY DIFFICULT
STIFFNESS: THE SYMPTOM AFFECTS MY ACTIVITY MODERATELY
KNEE_ACTIVITY_OF_DAILY_LIVING_SUM: 31
GO UP STAIRS: ACTIVITY IS FAIRLY DIFFICULT
KNEEL ON THE FRONT OF YOUR KNEE: ACTIVITY IS VERY DIFFICULT
PAIN: THE SYMPTOM AFFECTS MY ACTIVITY MODERATELY
KNEE_ACTIVITY_OF_DAILY_LIVING_SCORE: 44.29
GO DOWN STAIRS: ACTIVITY IS FAIRLY DIFFICULT
WEAKNESS: THE SYMPTOM AFFECTS MY ACTIVITY MODERATELY
HOW_WOULD_YOU_RATE_THE_OVERALL_FUNCTION_OF_YOUR_KNEE_DURING_YOUR_USUAL_DAILY_ACTIVITIES?: ABNORMAL
RISE FROM A CHAIR: ACTIVITY IS FAIRLY DIFFICULT
RAW_SCORE: 31
STIFFNESS: THE SYMPTOM AFFECTS MY ACTIVITY MODERATELY
PLEASE_INDICATE_YOR_PRIMARY_REASON_FOR_REFERRAL_TO_THERAPY:: KNEE
RISE FROM A CHAIR: ACTIVITY IS FAIRLY DIFFICULT
STAND: ACTIVITY IS SOMEWHAT DIFFICULT
GIVING WAY, BUCKLING OR SHIFTING OF KNEE: I HAVE THE SYMPTOM BUT IT DOES NOT AFFECT MY ACTIVITY
GO DOWN STAIRS: ACTIVITY IS FAIRLY DIFFICULT
LIMPING: THE SYMPTOM AFFECTS MY ACTIVITY MODERATELY
WALK: ACTIVITY IS FAIRLY DIFFICULT
WEAKNESS: THE SYMPTOM AFFECTS MY ACTIVITY MODERATELY
SIT WITH YOUR KNEE BENT: ACTIVITY IS FAIRLY DIFFICULT
GO UP STAIRS: ACTIVITY IS FAIRLY DIFFICULT
AS_A_RESULT_OF_YOUR_KNEE_INJURY,_HOW_WOULD_YOU_RATE_YOUR_CURRENT_LEVEL_OF_DAILY_ACTIVITY?: ABNORMAL
KNEEL ON THE FRONT OF YOUR KNEE: ACTIVITY IS VERY DIFFICULT
SQUAT: ACTIVITY IS FAIRLY DIFFICULT
STAND: ACTIVITY IS SOMEWHAT DIFFICULT
SWELLING: THE SYMPTOM AFFECTS MY ACTIVITY SLIGHTLY

## 2024-07-31 ENCOUNTER — THERAPY VISIT (OUTPATIENT)
Dept: PHYSICAL THERAPY | Facility: CLINIC | Age: 49
End: 2024-07-31
Attending: STUDENT IN AN ORGANIZED HEALTH CARE EDUCATION/TRAINING PROGRAM
Payer: COMMERCIAL

## 2024-07-31 DIAGNOSIS — M22.8X2 PATELLAR MALTRACKING, LEFT: ICD-10-CM

## 2024-07-31 DIAGNOSIS — M17.12 OSTEOARTHRITIS OF LEFT PATELLOFEMORAL JOINT: ICD-10-CM

## 2024-07-31 DIAGNOSIS — M25.862 IMPINGEMENT SYNDROME INVOLVING PATELLAR FAT PAD OF LEFT KNEE: ICD-10-CM

## 2024-07-31 PROCEDURE — 97161 PT EVAL LOW COMPLEX 20 MIN: CPT | Mod: GP | Performed by: PHYSICAL THERAPIST

## 2024-07-31 PROCEDURE — 97110 THERAPEUTIC EXERCISES: CPT | Mod: GP | Performed by: PHYSICAL THERAPIST

## 2024-07-31 NOTE — PROGRESS NOTES
PHYSICAL THERAPY EVALUATION  Type of Visit: Evaluation              Subjective       Presenting condition or subjective complaint: My knee has joint pain and arthritis due to merrick velez. Ininjured it recently and have difficulty in daily activities.  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    Pain assessment: Pain present- L knee     Objective   KNEE EVALUATION  INTEGUMENTARY (edema, incisions):  intermittent L knee swelling joint line R: 36 cm L: 37 cm  POSTURE: WNL  GAIT: slow, hesitant without knee brace, wears soft knee brace for stability, decreased pain with smaller steps and decreased eileen  BALANCE/PROPRIOCEPTION:  impaired L compared to R side on uneven surface- able to maintain on foam for 30 seconds but moderate sway noted  ROM: AROM WFL  STRENGTH:   Pain: - none + mild ++ moderate +++ severe  Strength Scale: 0-5/5 Left Right   Knee Flexion 4+ 5   Knee Extension 5- 5   Hip Abduction 4 5-     FUNCTIONAL TESTS:  tolerates DL squat but pain with SL  squat  PALPATION:  mild medial TTP  Assessment & Plan   CLINICAL IMPRESSIONS  Medical Diagnosis: L knee    Treatment Diagnosis: L knee pain   Impression/Assessment: Patient is a 48 year old male with L knee instability and pain complaints.  The following significant findings have been identified: Pain, Decreased ROM/flexibility, Decreased joint mobility, Decreased strength, Impaired balance, Decreased proprioception, Impaired gait, Impaired muscle performance, Decreased activity tolerance, and Instability. These impairments interfere with their ability to perform work tasks, recreational activities, household chores, driving , household mobility, and community mobility as compared to previous level of function.     Clinical Decision Making (Complexity):  Clinical Presentation: Stable/Uncomplicated  Clinical Presentation Rationale: based on medical and personal factors listed in PT evaluation  Clinical Decision Making (Complexity): Low complexity    PLAN OF CARE  Treatment Interventions:  Interventions: Gait Training, Manual Therapy, Neuromuscular Re-education, Therapeutic Activity, Therapeutic Exercise    Long Term Goals     PT Goal 1  Goal Identifier: Walking  Goal Description: Patient will tolerate walking/moving over uneven ground for at least 1 hour without increased knee pain.  Rationale: to maximize safety and independence with performance of ADLs and functional tasks;to maximize safety and independence within the home;to maximize safety and independence within the community;to maximize safety and independence with transportation;to maximize safety and independence with self cares  Target Date: 09/25/24  PT Goal 2  Goal Identifier: Strength  Goal Description: Patient will demonstrate B LE strength in all major muscle groups that is symmetrical and at least 5-/5 strength.  Rationale: to maximize safety and independence with performance of ADLs and functional tasks;to maximize safety and independence within the  home;to maximize safety and independence within the community;to maximize safety and independence with transportation;to maximize safety and independence with self cares  Target Date: 09/25/24      Frequency of Treatment: 1x every 2 weeks  Duration of Treatment: 8 weeks    Recommended Referrals to Other Professionals:   Education Assessment:   Learner/Method: Patient;No Barriers to Learning  Education Comments: no concerns    Risks and benefits of evaluation/treatment have been explained.   Patient/Family/caregiver agrees with Plan of Care.     Evaluation Time:     PT Eval, Low Complexity Minutes (93814): 25  Signing Clinician: Linda Cardona PT

## 2024-08-07 NOTE — PROGRESS NOTES
PROCEDURE ENCOUNTER    [facility]  Rafael Og, DO  2024     Name: Romario Lizarraga  MRN: [id number]  Age: 48 year old  : 1975    Referring provider:   Diagnosis:  Osteoarthritis of left patellofemoral joint  -     Physical Therapy  Referral; Future     Impingement syndrome involving patellar fat pad of left knee  -     Physical Therapy  Referral; Future     Patellar maltracking, left  -     Physical Therapy  Referral; Future        Romario is a very pleasant 48-year-old male presenting to clinic today for follow-up via telephone to discuss the findings on his MRI.  I had a long discussion with Romario in terms of the pathology that was seen on his MRI, consistent with that of patellar maltracking and fat pad impingement likely secondary to biomechanics.  Because this is also led to grade IV chondromalacia/patellofemoral OA of the left knee joint.  Patient does have a baseline pain of about 3, and is having some difficulty with squatting and performing hobbies he enjoys.  Because of that, I do recommend biomechanical retraining and strengthening with physical therapy.  He is amenable to this.  Order has been placed.  Because of the pain, I also recommend a corticosteroid injection over the coming weeks for hopeful successful resolution of his pain patterns.  Thereafter, I would like to see him 6 weeks after starting physical therapy to assess his progress.  All questions have been answered.      PROCEDURE    Knee Injection - Intraarticular  The patient was informed of the risks and the benefits of the procedure and a written consent was signed.  The patient s left knee was prepped with chlorhexidine in sterile fashion.   40 mg of triamcinolone suspension was drawn up into a 5 mL syringe with 4 mL of 1% lidocaine.  Topical ethyl chloride was used as an anesthetic.  Injection was performed using substerile technique.  A 1.5-inch 22-gauge needle was used to enter the  anterolateral aspect of the left knee by landmark guidance.  Injection performed successfully without difficulty.  There were no complications. The patient tolerated the procedure well. There was negligible bleeding. Band-Aid applied after.   The patient was instructed to ice the knee upon leaving clinic and refrain from overuse over the next 3-5 days.   The patient was instructed to call or go to the emergency room with any unusual pain, swelling, redness, or if otherwise concerned.    Rafael Og D.O., [facility]  , Sports Medicine  Department of Family [facility] and [facility]

## 2024-08-09 ENCOUNTER — ANCILLARY PROCEDURE (OUTPATIENT)
Dept: GENERAL RADIOLOGY | Facility: CLINIC | Age: 49
End: 2024-08-09
Attending: STUDENT IN AN ORGANIZED HEALTH CARE EDUCATION/TRAINING PROGRAM
Payer: COMMERCIAL

## 2024-08-09 ENCOUNTER — OFFICE VISIT (OUTPATIENT)
Dept: ORTHOPEDICS | Facility: CLINIC | Age: 49
End: 2024-08-09
Payer: COMMERCIAL

## 2024-08-09 DIAGNOSIS — M25.522 LEFT ELBOW PAIN: ICD-10-CM

## 2024-08-09 DIAGNOSIS — M17.12 OSTEOARTHRITIS OF LEFT PATELLOFEMORAL JOINT: ICD-10-CM

## 2024-08-09 DIAGNOSIS — M25.522 LEFT ELBOW PAIN: Primary | ICD-10-CM

## 2024-08-09 PROCEDURE — 20610 DRAIN/INJ JOINT/BURSA W/O US: CPT | Mod: LT | Performed by: STUDENT IN AN ORGANIZED HEALTH CARE EDUCATION/TRAINING PROGRAM

## 2024-08-09 PROCEDURE — 73080 X-RAY EXAM OF ELBOW: CPT | Mod: LT | Performed by: RADIOLOGY

## 2024-08-09 RX ORDER — LIDOCAINE HYDROCHLORIDE 10 MG/ML
4 INJECTION, SOLUTION EPIDURAL; INFILTRATION; INTRACAUDAL; PERINEURAL
Status: SHIPPED | OUTPATIENT
Start: 2024-08-09

## 2024-08-09 RX ORDER — TRIAMCINOLONE ACETONIDE 40 MG/ML
40 INJECTION, SUSPENSION INTRA-ARTICULAR; INTRAMUSCULAR
Status: SHIPPED | OUTPATIENT
Start: 2024-08-09

## 2024-08-09 RX ADMIN — TRIAMCINOLONE ACETONIDE 40 MG: 40 INJECTION, SUSPENSION INTRA-ARTICULAR; INTRAMUSCULAR at 10:48

## 2024-08-09 RX ADMIN — LIDOCAINE HYDROCHLORIDE 4 ML: 10 INJECTION, SOLUTION EPIDURAL; INFILTRATION; INTRACAUDAL; PERINEURAL at 10:48

## 2024-08-09 NOTE — PROGRESS NOTES
Large Joint Injection: L knee joint    Date/Time: 8/9/2024 10:48 AM    Performed by: Rafael Og DO  Authorized by: Rafael Og DO    Indications:  Pain and osteoarthritis  Needle Size comment:  23G  Guidance: landmark guided    Approach:  Anterolateral  Location:  Knee      Medications:  40 mg triamcinolone 40 MG/ML; 4 mL lidocaine (PF) 1 %  Outcome:  Tolerated well, no immediate complications  Procedure discussed: discussed risks, benefits, and alternatives    Consent Given by:  Patient  Timeout: timeout called immediately prior to procedure    Prep: patient was prepped and draped in usual sterile fashion       Lexa Brady M.A., LAT, ATC  Certified Athletic Trainer

## 2024-08-09 NOTE — LETTER
2024      RE: Romario Lizarraga  [address]  [facility] [place] [address]     Dear Colleague,    Thank you for referring your patient, Romario Lizarraga, to the [facility] [place]. Please see a copy of my visit note below.    PROCEDURE ENCOUNTER    [facility]  Rafael Og, DO  2024     Name: Romario Lizarraga  MRN: [id number]  Age: 48 year old  : 1975    Referring provider:   Diagnosis:  Osteoarthritis of left patellofemoral joint  -     Physical Therapy  Referral; Future     Impingement syndrome involving patellar fat pad of left knee  -     Physical Therapy  Referral; Future     Patellar maltracking, left  -     Physical Therapy  Referral; Future        Romario is a very pleasant 48-year-old male presenting to clinic today for follow-up via telephone to discuss the findings on his MRI.  I had a long discussion with Romario in terms of the pathology that was seen on his MRI, consistent with that of patellar maltracking and fat pad impingement likely secondary to biomechanics.  Because this is also led to grade IV chondromalacia/patellofemoral OA of the left knee joint.  Patient does have a baseline pain of about 3, and is having some difficulty with squatting and performing hobbies he enjoys.  Because of that, I do recommend biomechanical retraining and strengthening with physical therapy.  He is amenable to this.  Order has been placed.  Because of the pain, I also recommend a corticosteroid injection over the coming weeks for hopeful successful resolution of his pain patterns.  Thereafter, I would like to see him 6 weeks after starting physical therapy to assess his progress.  All questions have been answered.      PROCEDURE    Knee Injection - Intraarticular  The patient was informed of the risks and the benefits of the procedure and a written consent was signed.  The patient s left knee was prepped with  chlorhexidine in sterile fashion.   40 mg of triamcinolone suspension was drawn up into a 5 mL syringe with 4 mL of 1% lidocaine.  Topical ethyl chloride was used as an anesthetic.  Injection was performed using substerile technique.  A 1.5-inch 22-gauge needle was used to enter the anterolateral aspect of the left knee by landmark guidance.  Injection performed successfully without difficulty.  There were no complications. The patient tolerated the procedure well. There was negligible bleeding. Band-Aid applied after.   The patient was instructed to ice the knee upon leaving clinic and refrain from overuse over the next 3-5 days.   The patient was instructed to call or go to the emergency room with any unusual pain, swelling, redness, or if otherwise concerned.    Rafael Og D.O., [facility]  , Sports Medicine  Department of Family [facility]      Large Joint Injection: L knee joint    Date/Time: 8/9/2024 10:48 AM    Performed by: Rafael Og DO  Authorized by: Rafael Og DO    Indications:  Pain and osteoarthritis  Needle Size comment:  23G  Guidance: landmark guided    Approach:  Anterolateral  Location:  Knee      Medications:  40 mg triamcinolone 40 MG/ML; 4 mL lidocaine (PF) 1 %  Outcome:  Tolerated well, no immediate complications  Procedure discussed: discussed risks, benefits, and alternatives    Consent Given by:  Patient  Timeout: timeout called immediately prior to procedure    Prep: patient was prepped and draped in usual sterile fashion       Lexa Brady M.A., LAT, ATC  Certified Athletic Trainer            Again, thank you for allowing me to participate in the care of your patient.      Sincerely,    Rafael Og DO

## 2024-08-09 NOTE — NURSING NOTE
[address] [address]  Dept: [phone]  ______________________________________________________________________________    Patient: Romario Lizarraga   : 1975   MRN: [id number]   2024    INVASIVE PROCEDURE SAFETY CHECKLIST    Date: 24   Procedure: Left knee IA CSI  Patient Name: Romario Lizarraga  MRN: [id number]  YOB: 1975    Action: Complete sections as appropriate. Any discrepancy results in a HARD COPY until resolved.     PRE PROCEDURE:  Patient ID verified with 2 identifiers (name and  or MRN): Yes  Procedure and site verified with patient/designee (when able): Yes  Accurate consent documentation in medical record: Yes  H&P (or appropriate assessment) documented in medical record: Yes  H&P must be up to 20 days prior to procedure and updates within 24 hours of procedure as applicable: NA  Relevant diagnostic and radiology test results appropriately labeled and displayed as applicable: Yes  Procedure site(s) marked with provider initials: NA    TIMEOUT:  Time-Out performed immediately prior to starting procedure, including verbal and active participation of all team members addressing the following:Yes  * Correct patient identify  * Confirmed that the correct side and site are marked  * An accurate procedure consent form  * Agreement on the procedure to be done  * Correct patient position  * Relevant images and results are properly labeled and appropriately displayed  * The need to administer antibiotics or fluids for irrigation purposes during the procedure as applicable   * Safety precautions based on patient history or medication use    DURING PROCEDURE: Verification of correct person, site, and procedures any time the responsibility for care of the patient is transferred to another member of the care team.       Prior to injection, verified patient identity using patient's name and date of birth.  Due  to injection administration, patient instructed to remain in clinic for 15 minutes  afterwards, and to report any adverse reaction to me immediately.    Joint injection was performed.    Lidocaine Amount Wasted:  Yes: 1 mg/ml   Vial/Syringe: Multi dose vial  Expiration Date:  6/1/27      eLxa Brady, [facility]  August 9, 2024

## 2024-08-14 ENCOUNTER — THERAPY VISIT (OUTPATIENT)
Dept: PHYSICAL THERAPY | Facility: CLINIC | Age: 49
End: 2024-08-14
Payer: COMMERCIAL

## 2024-08-14 DIAGNOSIS — M17.12 OSTEOARTHRITIS OF LEFT PATELLOFEMORAL JOINT: Primary | ICD-10-CM

## 2024-08-14 DIAGNOSIS — M25.862 IMPINGEMENT SYNDROME INVOLVING PATELLAR FAT PAD OF LEFT KNEE: ICD-10-CM

## 2024-08-14 PROCEDURE — 97110 THERAPEUTIC EXERCISES: CPT | Mod: GP | Performed by: PHYSICAL THERAPIST

## 2024-08-19 ENCOUNTER — THERAPY VISIT (OUTPATIENT)
Dept: PHYSICAL THERAPY | Facility: CLINIC | Age: 49
End: 2024-08-19
Payer: COMMERCIAL

## 2024-08-19 DIAGNOSIS — M25.562 CHRONIC PAIN OF LEFT KNEE: Primary | ICD-10-CM

## 2024-08-19 DIAGNOSIS — G89.29 CHRONIC PAIN OF LEFT KNEE: Primary | ICD-10-CM

## 2024-08-19 PROCEDURE — 97110 THERAPEUTIC EXERCISES: CPT | Mod: GP | Performed by: PHYSICAL THERAPIST

## 2024-09-03 ENCOUNTER — THERAPY VISIT (OUTPATIENT)
Dept: PHYSICAL THERAPY | Facility: CLINIC | Age: 49
End: 2024-09-03
Payer: COMMERCIAL

## 2024-09-03 DIAGNOSIS — G89.29 CHRONIC PAIN OF LEFT KNEE: Primary | ICD-10-CM

## 2024-09-03 DIAGNOSIS — M25.562 CHRONIC PAIN OF LEFT KNEE: Primary | ICD-10-CM

## 2024-09-03 PROCEDURE — 97110 THERAPEUTIC EXERCISES: CPT | Mod: GP | Performed by: PHYSICAL THERAPIST

## 2024-09-03 PROCEDURE — 97112 NEUROMUSCULAR REEDUCATION: CPT | Mod: GP | Performed by: PHYSICAL THERAPIST

## 2024-09-18 ENCOUNTER — MYC MEDICAL ADVICE (OUTPATIENT)
Dept: ORTHOPEDICS | Facility: CLINIC | Age: 49
End: 2024-09-18
Payer: COMMERCIAL

## 2024-09-18 DIAGNOSIS — M65.331 TRIGGER FINGER, RIGHT MIDDLE FINGER: Primary | ICD-10-CM

## 2024-09-18 NOTE — TELEPHONE ENCOUNTER
Teaching Flowsheet   Relevant Diagnosis:     Trigger finger, right middle finger     Teaching Topic:   RELEASE, RIGHT MIDDLE TRIGGER FINGER, MASS EXCISION [77386 (CPT )] Right   On 10/24/24 with Dr. Orlando at the [facility].       Person(s) involved in teaching:   Patient     Motivation Level:  Asks Questions: Yes  Eager to Learn: Yes  Cooperative: Yes  Receptive (willing/able to accept information): Yes  Any cultural factors/Holiness beliefs that may influence understanding or compliance? No       Patient demonstrates understanding of the following:  Reason for the appointment, diagnosis and treatment plan: Yes  Knowledge of proper use of medications and conditions for which they are ordered (with special attention to potential side effects or drug interactions): Yes  Which situations necessitate calling provider and whom to contact: Yes       Teaching Concerns Addressed: None     Proper use and care of    (medical equip, care aids, etc.): Yes  Nutritional needs and diet plan: Yes  Pain management techniques: Yes  Wound Care: Yes  How and/when to access community resources: Yes     Instructional Materials Used/Given: Preoperative surgery packet, antibacterial Chlorhexidine soap. Stop Light Tool reviewed, after-hours number provided, patient verbalized understanding, had no immediate questions.

## 2024-09-23 ENCOUNTER — THERAPY VISIT (OUTPATIENT)
Dept: PHYSICAL THERAPY | Facility: CLINIC | Age: 49
End: 2024-09-23
Payer: COMMERCIAL

## 2024-09-23 DIAGNOSIS — G89.29 CHRONIC PAIN OF LEFT KNEE: Primary | ICD-10-CM

## 2024-09-23 DIAGNOSIS — M25.562 CHRONIC PAIN OF LEFT KNEE: Primary | ICD-10-CM

## 2024-09-23 PROCEDURE — 97112 NEUROMUSCULAR REEDUCATION: CPT | Mod: GP | Performed by: PHYSICAL THERAPIST

## 2024-09-23 ASSESSMENT — ACTIVITIES OF DAILY LIVING (ADL)
KNEEL ON THE FRONT OF YOUR KNEE: ACTIVITY IS MINIMALLY DIFFICULT
WALK: ACTIVITY IS NOT DIFFICULT
WEAKNESS: I DO NOT HAVE THE SYMPTOM
SWELLING: I DO NOT HAVE THE SYMPTOM
SQUAT: ACTIVITY IS NOT DIFFICULT
GO UP STAIRS: ACTIVITY IS NOT DIFFICULT
LIMPING: I DO NOT HAVE THE SYMPTOM
STIFFNESS: I DO NOT HAVE THE SYMPTOM
KNEE_ACTIVITY_OF_DAILY_LIVING_SUM: 68
GIVING WAY, BUCKLING OR SHIFTING OF KNEE: I HAVE THE SYMPTOM BUT IT DOES NOT AFFECT MY ACTIVITY
STAND: ACTIVITY IS NOT DIFFICULT
PAIN: I DO NOT HAVE THE SYMPTOM
RAW_SCORE: 68
SIT WITH YOUR KNEE BENT: ACTIVITY IS NOT DIFFICULT
KNEE_ACTIVITY_OF_DAILY_LIVING_SCORE: 97.14
HOW_WOULD_YOU_RATE_THE_OVERALL_FUNCTION_OF_YOUR_KNEE_DURING_YOUR_USUAL_DAILY_ACTIVITIES?: NEARLY NORMAL
RISE FROM A CHAIR: ACTIVITY IS NOT DIFFICULT
GO DOWN STAIRS: ACTIVITY IS NOT DIFFICULT

## 2024-09-23 NOTE — PROGRESS NOTES
PHYSICAL THERAPY DISCHARGE    Goals: met (see flowsheet below for details)    Plan: continue with HEP independently       09/23/24 0500   Appointment Info   Signing clinician's name / credentials Linda Casey PT, DPT   Visits Used 5   Medical Diagnosis L knee   PT Tx Diagnosis L knee pain   Progress Note/Certification   Onset of illness/injury or Date of Surgery 04/01/24   Therapy Frequency 1x every 2 weeks   Predicted Duration 8 weeks   Progress Note Due Date 09/27/24   PT Goal 1   Goal Identifier Walking   Goal Description Patient will tolerate walking/moving over uneven ground for at least 1 hour without increased knee pain.   Rationale to maximize safety and independence with performance of ADLs and functional tasks;to maximize safety and independence within the home;to maximize safety and independence within the community;to maximize safety and independence with transportation;to maximize safety and independence with self cares   Goal Progress met   Target Date 09/25/24   Date Met 09/23/24   PT Goal 2   Goal Identifier Strength   Goal Description Patient will demonstrate B LE strength in all major muscle groups that is symmetrical and at least 5-/5 strength.   Rationale to maximize safety and independence with performance of ADLs and functional tasks;to maximize safety and independence within the home;to maximize safety and independence within the community;to maximize safety and independence with transportation;to maximize safety and independence with self cares   Goal Progress met   Target Date 09/25/24   Date Met 09/23/24   Subjective Report   Subjective Report States that he has been feeling good overall. No knee pain and decreased instability lately. He was able to bike 25 miles with his son and the  troop over the past weekend without issue from the knee.   Objective Measures   Objective Measures Objective Measure 2   Objective Measure 2   Objective Measure B LE strength   Details WNL in all major  muscle groups bilaterally   Therapeutic Procedure/Exercise   Therapeutic Procedures: strength, endurance, ROM, flexibility minutes (09073) 5   PTRx Ther Proc 1 Hip Flexion Straight Leg Raise   PTRx Ther Proc 1 - Details continue at home until 30 is easy   PTRx Ther Proc 2 Hip Abduction Straight Leg Raise   PTRx Ther Proc 2 - Details continue until 30 is easy   PTRx Ther Proc 4 Side Stepping With Theraband   PTRx Ther Proc 4 - Details continue 2-3x per week   PTRx Ther Proc 5 Single Leg Bridge   PTRx Ther Proc 5 - Details continue 2-3x per week   PTRx Ther Proc 6 Standing Fire Hydrant   PTRx Ther Proc 6 - Details continue 2-3x per week   Skilled Intervention review strengthening and what to continue to HEP   Patient Response/Progress reported and demonstrated understanding   Neuromuscular Re-education   Neuromuscular re-ed of mvmt, balance, coord, kinesthetic sense, posture, proprioception minutes (39080) 12   Neuro Re-ed 1 SL RDL   Neuro Re-ed 1 - Details x12 on each side   PTRx Neuro Re-ed 1 SLS   PTRx Neuro Re-ed 1 - Details mild decreased stability remains L compared to R side   PTRx Neuro Re-ed 2 Monster Walks   PTRx Neuro Re-ed 2 - Details RTB 2x15 steps in each direction   PTRx Neuro Re-ed 3 Lateral Step Down   PTRx Neuro Re-ed 3 - Details x 20B 4 inch step good challenge overall- cues to slow down movement   Skilled Intervention for increased stability and muscular control   Patient Response/Progress tolerated, decreased stability   Neuro Re-ed 2 SL squat   Neuro Re-ed 2 - Details at 26 inches x10 on each LE   Neuromuscular Re-education Neuro Re-ed 2   Education   Learner/Method Patient;Demonstration;Pictures/Video;No Barriers to Learning   Education Comments return to daily activities around the yard, slowly return to soccer related movements   Plan   Home program Ptrx HEP- phone   Updates to plan of care d/c- continue HEP independently   Total Session Time   Timed Code Treatment Minutes 17   Total Treatment  Time (sum of timed and untimed services) 17

## 2024-10-24 ENCOUNTER — HOSPITAL ENCOUNTER (OUTPATIENT)
Facility: AMBULATORY SURGERY CENTER | Age: 49
Discharge: HOME | End: 2024-10-24
Attending: STUDENT IN AN ORGANIZED HEALTH CARE EDUCATION/TRAINING PROGRAM | Admitting: STUDENT IN AN ORGANIZED HEALTH CARE EDUCATION/TRAINING PROGRAM
Payer: COMMERCIAL

## 2024-10-24 VITALS
OXYGEN SATURATION: 95 % | HEART RATE: 70 BPM | DIASTOLIC BLOOD PRESSURE: 81 MMHG | TEMPERATURE: 97.8 F | WEIGHT: 215 LBS | RESPIRATION RATE: 16 BRPM | BODY MASS INDEX: 29.12 KG/M2 | SYSTOLIC BLOOD PRESSURE: 141 MMHG | HEIGHT: 72 IN

## 2024-10-24 DIAGNOSIS — M65.331 TRIGGER FINGER, RIGHT MIDDLE FINGER: Primary | ICD-10-CM

## 2024-10-24 PROCEDURE — 88307 TISSUE EXAM BY PATHOLOGIST: CPT | Mod: 26 | Performed by: STUDENT IN AN ORGANIZED HEALTH CARE EDUCATION/TRAINING PROGRAM

## 2024-10-24 PROCEDURE — 26055 INCISE FINGER TENDON SHEATH: CPT | Mod: F7

## 2024-10-24 PROCEDURE — 88307 TISSUE EXAM BY PATHOLOGIST: CPT | Mod: TC | Performed by: STUDENT IN AN ORGANIZED HEALTH CARE EDUCATION/TRAINING PROGRAM

## 2024-10-24 RX ORDER — LIDOCAINE HYDROCHLORIDE AND EPINEPHRINE 10; 10 MG/ML; UG/ML
10 INJECTION, SOLUTION INFILTRATION; PERINEURAL ONCE
Status: DISCONTINUED | OUTPATIENT
Start: 2024-10-24 | End: 2024-10-26 | Stop reason: HOSPADM

## 2024-10-24 RX ORDER — LIDOCAINE HYDROCHLORIDE AND EPINEPHRINE 10; 10 MG/ML; UG/ML
INJECTION, SOLUTION INFILTRATION; PERINEURAL PRN
Status: DISCONTINUED | OUTPATIENT
Start: 2024-10-24 | End: 2024-10-24 | Stop reason: HOSPADM

## 2024-10-24 RX ORDER — OXYCODONE HYDROCHLORIDE 5 MG/1
5 TABLET ORAL EVERY 6 HOURS PRN
Qty: 2 TABLET | Refills: 0 | Status: SHIPPED | OUTPATIENT
Start: 2024-10-24 | End: 2024-10-27

## 2024-10-24 NOTE — DISCHARGE INSTRUCTIONS
Procedure Performed: Right middle trigger finger release, mass excision  Attending Surgeon: Rafael Orlando MD  Date: 10/24/2024    DIAGNOSIS  1. Trigger finger, right middle finger        MEDICATIONS   Resume all home medications as directed unless otherwise instructed during this hospitalization. If there is any question, double check with your primary care provider.  Start new discharge medications as directed.    Take 1 tablet of 650 mg Tylenol (acetaminophen) Arthritis Strength (extended release) and 1 tablet of Aleve (naproxen) 220 mg in the morning with breakfast and in the evening with dinner.     For breakthrough pain use narcotic pain medication as prescribed.    Do not drive or operate machinery while taking narcotic pain medications.   If you are taking other Tylenol containing medicines at home, be sure NOT to exceed 4 gram's (4000 milligrams) of Tylenol per day.   If you are taking pain medications, be sure to take Colace (docusate sodium) as well to prevent constipation. If constipated, try adding another cathartic or enema.  If nausea and vomiting, call the hospital or seek medical attention.    ACTIVITY   Weight bearin lb coffee cup weight bearing to operative extremity    DIET  Resume same diet prior to your hospital admission.    WOUND   Leave dressing on until you are seen in clinic for your follow up visit.   Watch for signs and symptoms of infection of your wounds including; pain, redness, swelling, drainage or fever.  If you notice any of these symptoms please call or seek medical attention.    Keep wound clean, dry, and intact.  Do not submerge wounds in water until they are healed. No baths, soaking, swimming, or prolonged water exposure for 4 weeks after surgery.    RETURN   Follow-up with Orthopedic Clinic as directed.     Future Appointments   Date Time Provider Department Center   10/31/2024  8:00 AM Phan Estrada PA-C [facility]   2025  9:30 AM Daniel Bauer  MD [facility]       Call the [facility] at [phone] during business hours for any symptoms such as:    * Fevers with Temperature greater than 101.5 degrees.   * Pus drainage from wound site.   * Severe pain, not controlled by medication.   * Persistent nausea, vomiting and inablility to tolerate fluids.    If you are receiving care in [place], you may call the Orthopedic clinic at [phone].    FOR URGENT PROBLEMS ONLY, after hours or on weekends call the hospital  at [phone] and ask to speak with the orthopedic resident on call.

## 2024-10-24 NOTE — BRIEF OP NOTE
[facility] [place]    Brief Operative Note    Pre-operative     diagnosis: Trigger finger, right middle finger [M65.331]  Post-operative diagnosis Same as pre-operative diagnosis    Procedure: RELEASE, RIGHT MIDDLE TRIGGER FINGER, MASS EXCISION, Right - Wrist    Surgeon: Surgeons and Role:     * Rafael Orlando MD - Primary  Anesthesia: Local   Estimated Blood Loss: Minimal    Drains: None  Specimens:   ID Type Source Tests Collected by Time Destination   1 : Right middle A1 pulley mass Tissue Finger, Right SURGICAL PATHOLOGY EXAM Rafael Orlando MD 10/24/2024  2:34 PM      Findings:   None.  Complications: None.  Implants: * No implants in log *    - CCWB with the operative hand  - Keep dressing in place until follow up   - Tylenol, ibuprofen and short course of oxycodone for pain  - Elevate operative extremity   - Follow up with Dr. Orlando's team as scheduled   - Discharge to home when meets PACU discharge criteria

## 2024-10-24 NOTE — OP NOTE
Patient: Romario Lizarraga  : 1975  MRN: [id number]    DATE OF OPERATION: 2024      OPERATIVE REPORT       PREOPERATIVE DIAGNOSIS:  1) Right middle trigger finger  2) Right hand mass    POSTOPERATIVE DIAGNOSIS:  1) Right middle trigger finger  2) Right hand mass     PROCEDURE:  1) Open release of A1 pulley of the right middle finger CPT 17749  2) Excision of mass right hand (deep), < 1.5cm CPT 12273    SURGEON:  Rafael Orlando MD     ASSISTANT(S):  None    ANESTHESIA:  Local: 10cc 1% lidocaine 1:100,000 epinephrine     IMPLANTS:   None    ESTIMATED BLOOD LOSS:  1cc    DVT PROPHYLAXIS:  None    TOURNIQUET TIME:  7 minutes    SPECIMENS REMOVED:  None    INTRAOPERATIVE FINDINGS:  Fibrous mass directly on top of the A1 pulley.  Tenosynovitis of the flexor tendons at the A1 pulley of right middle finger    COMPLICATIONS:  None    DISPOSITION:  Stable to PACU.     INDICATIONS:  Mr. Romario Lizarraga is a 49 year old male with longstanding history of triggering of the right middle finger recalcitrant to non-operative measures.  He also felt a palpable mass or nodule adjacent to the A1 pulley at the right middle finger.  Because it was quite tender and he had persistent triggering, he wished to proceed with surgery.    The indications for surgery were discussed with the patient. The benefits, risks, and alternatives of operative management were discussed in detail with the patient. The patient understands that the risks of surgery include, but are not limited to: infection, bleeding, injury to nearby structures (such as nerves, blood vessels, and tendons), recurrence, persistent triggering, need for additional surgery, pain, stiffness, scarring, need for rehabilitation, and anesthetic complications.  Patient expressed understanding and elected to proceed with surgery. All questions were answered to the patient's satisfaction.    Consent was obtained for the procedure.     DESCRIPTION OF PROCEDURE IN  DETAIL:  The patient was seen  in the preoperative care unit. Patient identity, consent, procedure to be performed, and operative site were verified with the patient. The right middle finger was marked.     The patient was brought to the operating room and placed supine on the operating room table. The right upper extremity was placed on an armboard.    The right upper extremity was prepped and draped in the normal sterile fashion. A preoperative timeout was performed to identify the correct patient and procedure and all were in agreement.    10 cc of 1% lidocaine 1: 100,000 epinephrine was infiltrated in the skin and subcutaneous tissues over the A1 pulley for a digital block.  The limb was exsanguinated and tourniquet inflated to 200 mmHg.  Incision was first made on the volar aspect of right middle finger in a transverse fashion in the palmar crease over the A1 pulley. Blunt dissection was taken down deep to the palmar fascia to the level of the flexor sheath, identifying and protecting the neurovascular bundle on either side of the sheath.  An approximately 7mm mass was found sitting on top of the A1 pulley.  It was fibrous in appearance and did not have the typical appearance of a retinacular cyst.  Therefore it was carefully dissected off of the A1 pulley and flexor tendon sheath and sent for pathology.    The A1 pulley in its entirety was visualized. This was sharply incised with a [place] blade, protecting the neurovascular bundle on either side and the tendon below. Proximally the A0 pulley was also divided under direct visualization. A complete release was performed and visualized. The tenosynovitis on the tendons was gently debrided with tenotomy scissors.    The patient was asked to make a fist several times and no triggering was visualized of the finger.  Tourniquet was deflated and hemostasis assured.  This wound was thoroughly irrigated. Careful hemostasis was assured with bipolar cautery. The surgical  incision was closed with 4-0 monocryl in interrupted buried fashion.  Steri-Strips and sterile soft dressing were applied.    All needle and sponge counts were correct at the end of the procedure. There were no complications.     The patient was taken to the postoperative recovery unit in stable condition.     I was present and scrubbed for the entire procedure.     POSTOPERATIVE PLAN:  The patient will maintain the postoperative dressing until the postoperative visit in 10-14 days. The dressing will be kept clean and dry. The patient will remain 1 pound coffee cup weightbearing on the operative extremity. We have discussed and instructed the patient on range of motion exercises of the digits and wrist.       Rafael Orlando MD     Hand, Upper Extremity & Microvascular Surgery  Department of Orthopedic Surgery  [facility]

## 2024-10-25 ENCOUNTER — MYC MEDICAL ADVICE (OUTPATIENT)
Dept: ORTHOPEDICS | Facility: CLINIC | Age: 49
End: 2024-10-25
Payer: COMMERCIAL

## 2024-10-28 LAB
PATH REPORT.COMMENTS IMP SPEC: NORMAL
PATH REPORT.COMMENTS IMP SPEC: NORMAL
PATH REPORT.FINAL DX SPEC: NORMAL
PATH REPORT.GROSS SPEC: NORMAL
PATH REPORT.MICROSCOPIC SPEC OTHER STN: NORMAL
PATH REPORT.RELEVANT HX SPEC: NORMAL
PHOTO IMAGE: NORMAL

## 2024-10-31 ENCOUNTER — OFFICE VISIT (OUTPATIENT)
Dept: ORTHOPEDICS | Facility: CLINIC | Age: 49
End: 2024-10-31
Payer: COMMERCIAL

## 2024-10-31 VITALS
DIASTOLIC BLOOD PRESSURE: 84 MMHG | BODY MASS INDEX: 29.12 KG/M2 | SYSTOLIC BLOOD PRESSURE: 142 MMHG | WEIGHT: 215 LBS | HEIGHT: 72 IN

## 2024-10-31 DIAGNOSIS — Z47.89 ORTHOPEDIC AFTERCARE: ICD-10-CM

## 2024-10-31 DIAGNOSIS — Z98.890 S/P TRIGGER FINGER RELEASE: Primary | ICD-10-CM

## 2024-10-31 PROCEDURE — 99024 POSTOP FOLLOW-UP VISIT: CPT | Performed by: PHYSICIAN ASSISTANT

## 2024-10-31 NOTE — Clinical Note
10/31/2024      Romario Lizarraga  [address] [facility] [address]      Dear Colleague,    Thank you for referring your patient, Romario Lizarraga, to the [facility] [place]. Please see a copy of my visit note below.    HISTORY OF PRESENT ILLNESS:    Romario Lizarraga is a 49 year old male who is seen in follow up for right middle finger A1 pulley release.  Present symptoms: Pt reports good improvement.  There does not appear to be any triggering, but is still unable to make a fist.  Is keeping covered. Is using hand for activities. No new complaints.  He is working on his range of motion daily.  Denies Chest pain, Calve pain, Fever, Chills.    Current Treatment: Postop.    PHYSICAL EXAM:  There were no vitals taken for this visit.  There is no weight on file to calculate BMI.   GENERAL APPEARANCE: healthy, alert and no distress   PSYCH: mentation appears normal and affect normal/bright    MSK:  {RIGHT:635258} .  Incision clean and dry, Sutures present, healing.  *** incisional erythema.   No Ecchymosis.  Edema *** at *** digits.  CMS: manoj incisional numbness, otherwise grossly intact to digits.  AROM: mild restriction in *** flexion, otherwise WNL with no triggering.      ASSESSMENT:  Romario Lizarraga is a 49 year old male  S/P *** A1 pulley release.  Healing.    PLAN:  - Surgery discussed, images reviewed if applicable, and all questions were answered at this time.  - Sutures removed with sterile technique, steri-strips applied in usual fashion, care instructions given and verbally acknowledged.  - Medications: OTC PRN.  - Physical Therapy: As instructed/ RICE and PROM.  - AAT    Return to clinic PRN.    Phan Estrada PA-C    Dept. Orthopedic Surgery  [facility]     10/31/2024         Again, thank you for allowing me to participate in the care of your patient.        Sincerely,        Phan Estrada PA-C

## 2024-10-31 NOTE — PROGRESS NOTES
HISTORY OF PRESENT ILLNESS:    Romario Lizarraga is a 49 year old male who returns in follow up 1 week (DOS: 10/24/2024) s/p open release of A1 pulley of the right middle finger CPT and excision of mass right hand (deep), < 1.5cm Present symptoms: Pt reports good improvement.  There does not appear to be any triggering, but is still unable to make a fist.  Is keeping covered. Is using hand for activities. No new complaints.  He is working on his range of motion daily.  Denies Chest pain, Calve pain, Fever, Chills.    Current Treatment: Postop.    PHYSICAL EXAM:  There were no vitals taken for this visit.  There is no weight on file to calculate BMI.   GENERAL APPEARANCE: healthy, alert and no distress   PSYCH: mentation appears normal and affect normal/bright    MSK:  Right: long finger  Incision clean and dry, Sutures present, healing.  No incisional erythema.   No Ecchymosis.  Edema: none   CMS: manoj incisional numbness, otherwise grossly intact to digits.  AROM: mild restriction in middle finger flexion, otherwise WNL with no triggering.      ASSESSMENT:  Romario Lizarraga is a 49 year old male S/P long finger A1 pulley release.  Healing.    PLAN:  - Surgery discussed, images reviewed if applicable, and all questions were answered at this time.  - Steri-strips applied in usual fashion, care instructions given and verbally acknowledged.  - Medications: OTC PRN.  - Physical Therapy: As instructed/ RICE and PROM.  - AAT    Return to clinic PRN.    Phan Estrada PA-C    Dept. Orthopedic Surgery  [facility]     10/31/2024

## 2024-11-06 NOTE — TELEPHONE ENCOUNTER
DIAGNOSIS: (L) Elbow    APPOINTMENT DATE: 11/8/2024   NOTES STATUS DETAILS   OFFICE NOTE from referring provider  Self    MEDICATION LIST Internal    XRAYS (IMAGES & REPORTS) Internal Xray Elbow Left 8/9/2024

## 2024-11-07 NOTE — PROGRESS NOTES
[facility]  Sports Medicine Clinic  Clinics and Surgery Center           SUBJECTIVE       Romario Lizarraga is a 49 year old male presenting to clinic today with left elbow pain.  Patient is right-hand dominant.  He has not done any hand therapy.  He uses intermittent ibuprofen and he is on gabapentin as well for chronic back pain.  He is not having any instability, night sweats, fevers, or unexpected weight loss.  He is having no pain that is awakening him at night, although he does note some trouble sleeping because of the pain in the left elbow.    Background:   Occupation:    Hand Dominance (If pertinent): Right    Injury (Y/N): No   Work Comp (Y/N): No  Date of injury: NA  Mechanism of Injury: Pt does note that he noticed the pain after an instance of cutting down a few trees with a chain saw    Duration of symptoms: 1 year  Intensity (1-10): 5/10   Aggravating factors: Sleeping, palpation   Relieving Factors: compression sleeve, advil   Prior Evaluation: None   Previous Surgery on the area (Y/N): None   Physical Therapy (Previous/Current/None): None    Physical Activity/Exercise (What, How Often): , hiking       PMH, Medications and Allergies were reviewed and updated as needed.    ROS:  As noted above otherwise negative.    Patient Active Problem List   Diagnosis    CARDIOVASCULAR SCREENING; LDL GOAL LESS THAN 160    Seborrheic dermatitis    Mild persistent asthma without complication    Chronic low back pain, unspecified back pain laterality, unspecified whether sciatica present    Chronic pain in testicle    Decreased libido    Trigger finger, right middle finger       Current Outpatient Medications   Medication Sig Dispense Refill    albuterol (PROAIR HFA/PROVENTIL HFA/VENTOLIN HFA) 108 (90 Base) MCG/ACT inhaler Inhale 2 puffs into the lungs every 4 hours as needed for shortness of breath / dyspnea 3 Inhaler 3    finasteride (PROPECIA) 1 MG tablet Take 1 tablet (1  mg) by mouth daily 90 tablet 3    finasteride (PROSCAR) 5 MG tablet Take 1/4 tab daily 30 tablet 2    Fluticasone-Salmeterol (ADVAIR HFA IN)       fluticasone-salmeterol (ADVAIR) 250-50 MCG/ACT inhaler Inhale 1 puff into the lungs every 12 hours 3 each 3    gabapentin (NEURONTIN) 300 MG capsule TAKE ONE CAPSULE BY MOUTH THREE TIMES DAILY AS NEEDED 270 capsule 3    ibuprofen (ADVIL/MOTRIN) 200 MG tablet Take 800 mg by mouth every 4 hours as needed for mild pain      ibuprofen (ADVIL/MOTRIN) 600 MG tablet Take 1 tablet (600 mg) by mouth every evening for 14 days. 14 tablet 0    minoxidil (LONITEN) 2.5 MG tablet Take 1 tablet (2.5 mg) by mouth daily 90 tablet 3    sildenafil (VIAGRA) 50 MG tablet TAKE ONE TO TWO TABLETS BY MOUTH DAILY AS NEEDED  FOR ED 60 tablet 0    tiZANidine (ZANAFLEX) 2 MG tablet Take 2 tablets (4 mg) by mouth nightly as needed for muscle spasms. 14 tablet 0            OBJECTIVE:       Vitals: There were no vitals filed for this visit.  BMI: There is no height or weight on file to calculate BMI.    Gen:  Well nourished and in no acute distress  HEENT: Extraocular movement intact  Neck: Supple  Pulm:  Breathing Comfortably. No increased respiratory effort.  Psych: Euthymic. Appropriately answers questions    MSK: Left elbow without area of erythema or ecchymosis.  No overlying edema.  No warmth.  Range of motion is full and intact, as well as supination and pronation.  Tenderness to palpation at the lateral epicondyle, and medially into the triceps tendon.  Strength testing in flexion and extension is intact, although resisted extension does lead to pain in the posterior lateral aspect of the left elbow.  Pain is not worsened with wrist extension or wrist flexion.  Not worsened with pronation, but patient does have pain with supination as well.  Negative valgus stress testing.  Positive for pain with varus stress testing and milking maneuver.  Positive pain in the posterior lateral elbow with arm  bar testing.    Study Result    Narrative & Impression   3 views left elbow radiographs 8/9/2024 11:33 AM     History: Left elbow pain      Additional History from EMR:  None       Comparison: None     Findings:     AP, oblique, and lateral views of the left elbow were obtained.      No acute osseous abnormality.       No substantial degenerative change.     Tiny triceps tendon insertional enthesophyte. Mild soft tissue  swelling overlying the olecranon.                                                                      Impression:  1. No acute osseous abnormality.  2. Mild soft tissue prominence over the olecranon.             ASSESSMENT and PLAN:     Romario was seen today for pain.    Diagnoses and all orders for this visit:    Chronic elbow pain, left  -     ibuprofen (ADVIL/MOTRIN) 600 MG tablet; Take 1 tablet (600 mg) by mouth every evening for 14 days.  -     tiZANidine (ZANAFLEX) 2 MG tablet; Take 2 tablets (4 mg) by mouth nightly as needed for muscle spasms.  -     Hand Therapy Referral; Future      Romario is a 49-year-old right-hand-dominant male presenting to clinic with chronic left elbow pain.  Patient was chopping wood, quite sometime ago, and then felt more pain in the elbow.  He is not having functional immobility or weakness of the elbow.  Patient's left elbow pain seems multifactorial, with portions of it relatable to lateral epicondylitis, as well as a possible UCL sprain, and exam findings that are consistent with valgus extension overload.  Patient has used ibuprofen 600 mg once daily, and he is currently on gabapentin for chronic back pain.    Plan:  I have had a long discussion with Romario in terms of his exam, x-ray findings, as well as differential diagnosis that is contributing to the pain.  I do not think the patient has findings that are mutually exclusive from 1 another, and likely has concomitant pathology presenting at the same time.  However the patient is strong, and functionally  appropriate.  He is also not having red flag signs.  At this point I do think it would be most beneficial for the patient to enter hand therapy, with the expectation he is improving over the next 6 weeks, not perfect, but if not improving, we will obtain an MRI of the elbow.  I also recommend the patient take twice daily ibuprofen, to help with him occasionally having pain that will prevent him from having a restful night sleep as well as other household and lifestyle factors.  I have also agreed to a short course of Zanaflex for the patient to be used only at night as needed, the patient has had this before from his primary care physician, which has helped him sleep.  I have discussed with the patient this is not a medication that I would recommend that we continue to refill, and would recommend that he reach out to his primary care physician and follow-up with his primary care physician for further recommendations on this medication as I do not believe that the elbow pain in and of itself is substantiated for this medication in the long-term.  All questions answered.  Return precautions advised.    Options for treatment and/or follow-up care were reviewed with the patient was actively involved in the decision making process. Patient verbalized understanding and was in agreement with the plan.    Rafael Og DO  , Sports Medicine  Department of Family Medicine and [facility]

## 2024-11-08 ENCOUNTER — OFFICE VISIT (OUTPATIENT)
Dept: ORTHOPEDICS | Facility: CLINIC | Age: 49
End: 2024-11-08
Payer: COMMERCIAL

## 2024-11-08 ENCOUNTER — PRE VISIT (OUTPATIENT)
Dept: ORTHOPEDICS | Facility: CLINIC | Age: 49
End: 2024-11-08

## 2024-11-08 DIAGNOSIS — G89.29 CHRONIC ELBOW PAIN, LEFT: Primary | ICD-10-CM

## 2024-11-08 DIAGNOSIS — M25.522 CHRONIC ELBOW PAIN, LEFT: Primary | ICD-10-CM

## 2024-11-08 PROCEDURE — 99214 OFFICE O/P EST MOD 30 MIN: CPT | Mod: 24 | Performed by: STUDENT IN AN ORGANIZED HEALTH CARE EDUCATION/TRAINING PROGRAM

## 2024-11-08 RX ORDER — TIZANIDINE 2 MG/1
4 TABLET ORAL
Qty: 14 TABLET | Refills: 0 | Status: SHIPPED | OUTPATIENT
Start: 2024-11-08 | End: 2024-11-12

## 2024-11-08 RX ORDER — IBUPROFEN 600 MG/1
600 TABLET, FILM COATED ORAL EVERY EVENING
Qty: 14 TABLET | Refills: 0 | Status: SHIPPED | OUTPATIENT
Start: 2024-11-08 | End: 2024-11-22

## 2024-11-08 NOTE — LETTER
11/8/2024      RE: Romario Lizarraga  [address]  [facility] [address]     Dear Colleague,    Thank you for referring your patient, Romario Lizarraga, to the [facility] [place]. Please see a copy of my visit note below.    [facility]  Sports Medicine Clinic  Clinics and Surgery Center           SUBJECTIVE       Romario Lizarraga is a 49 year old male presenting to clinic today with left elbow pain.  Patient is right-hand dominant.  He has not done any hand therapy.  He uses intermittent ibuprofen and he is on gabapentin as well for chronic back pain.  He is not having any instability, night sweats, fevers, or unexpected weight loss.  He is having no pain that is awakening him at night, although he does note some trouble sleeping because of the pain in the left elbow.    Background:   Occupation:    Hand Dominance (If pertinent): Right    Injury (Y/N): No   Work Comp (Y/N): No  Date of injury: NA  Mechanism of Injury: Pt does note that he noticed the pain after an instance of cutting down a few trees with a chain saw    Duration of symptoms: 1 year  Intensity (1-10): 5/10   Aggravating factors: Sleeping, palpation   Relieving Factors: compression sleeve, advil   Prior Evaluation: None   Previous Surgery on the area (Y/N): None   Physical Therapy (Previous/Current/None): None    Physical Activity/Exercise (What, How Often): , hiking       PMH, Medications and Allergies were reviewed and updated as needed.    ROS:  As noted above otherwise negative.    Patient Active Problem List   Diagnosis     CARDIOVASCULAR SCREENING; LDL GOAL LESS THAN 160     Seborrheic dermatitis     Mild persistent asthma without complication     Chronic low back pain, unspecified back pain laterality, unspecified whether sciatica present     Chronic pain in testicle     Decreased libido     Trigger finger, right middle finger       Current  Outpatient Medications   Medication Sig Dispense Refill     albuterol (PROAIR HFA/PROVENTIL HFA/VENTOLIN HFA) 108 (90 Base) MCG/ACT inhaler Inhale 2 puffs into the lungs every 4 hours as needed for shortness of breath / dyspnea 3 Inhaler 3     finasteride (PROPECIA) 1 MG tablet Take 1 tablet (1 mg) by mouth daily 90 tablet 3     finasteride (PROSCAR) 5 MG tablet Take 1/4 tab daily 30 tablet 2     Fluticasone-Salmeterol (ADVAIR HFA IN)        fluticasone-salmeterol (ADVAIR) 250-50 MCG/ACT inhaler Inhale 1 puff into the lungs every 12 hours 3 each 3     gabapentin (NEURONTIN) 300 MG capsule TAKE ONE CAPSULE BY MOUTH THREE TIMES DAILY AS NEEDED 270 capsule 3     ibuprofen (ADVIL/MOTRIN) 200 MG tablet Take 800 mg by mouth every 4 hours as needed for mild pain       ibuprofen (ADVIL/MOTRIN) 600 MG tablet Take 1 tablet (600 mg) by mouth every evening for 14 days. 14 tablet 0     minoxidil (LONITEN) 2.5 MG tablet Take 1 tablet (2.5 mg) by mouth daily 90 tablet 3     sildenafil (VIAGRA) 50 MG tablet TAKE ONE TO TWO TABLETS BY MOUTH DAILY AS NEEDED  FOR ED 60 tablet 0     tiZANidine (ZANAFLEX) 2 MG tablet Take 2 tablets (4 mg) by mouth nightly as needed for muscle spasms. 14 tablet 0            OBJECTIVE:       Vitals: There were no vitals filed for this visit.  BMI: There is no height or weight on file to calculate BMI.    Gen:  Well nourished and in no acute distress  HEENT: Extraocular movement intact  Neck: Supple  Pulm:  Breathing Comfortably. No increased respiratory effort.  Psych: Euthymic. Appropriately answers questions    MSK: Left elbow without area of erythema or ecchymosis.  No overlying edema.  No warmth.  Range of motion is full and intact, as well as supination and pronation.  Tenderness to palpation at the lateral epicondyle, and medially into the triceps tendon.  Strength testing in flexion and extension is intact, although resisted extension does lead to pain in the posterior lateral aspect of the left  elbow.  Pain is not worsened with wrist extension or wrist flexion.  Not worsened with pronation, but patient does have pain with supination as well.  Negative valgus stress testing.  Positive for pain with varus stress testing and milking maneuver.  Positive pain in the posterior lateral elbow with arm bar testing.    Study Result    Narrative & Impression   3 views left elbow radiographs 8/9/2024 11:33 AM     History: Left elbow pain      Additional History from EMR:  None       Comparison: None     Findings:     AP, oblique, and lateral views of the left elbow were obtained.      No acute osseous abnormality.       No substantial degenerative change.     Tiny triceps tendon insertional enthesophyte. Mild soft tissue  swelling overlying the olecranon.                                                                      Impression:  1. No acute osseous abnormality.  2. Mild soft tissue prominence over the olecranon.             ASSESSMENT and PLAN:     Romario was seen today for pain.    Diagnoses and all orders for this visit:    Chronic elbow pain, left  -     ibuprofen (ADVIL/MOTRIN) 600 MG tablet; Take 1 tablet (600 mg) by mouth every evening for 14 days.  -     tiZANidine (ZANAFLEX) 2 MG tablet; Take 2 tablets (4 mg) by mouth nightly as needed for muscle spasms.  -     Hand Therapy Referral; Future      Romario is a 49-year-old right-hand-dominant male presenting to clinic with chronic left elbow pain.  Patient was chopping wood, quite sometime ago, and then felt more pain in the elbow.  He is not having functional immobility or weakness of the elbow.  Patient's left elbow pain seems multifactorial, with portions of it relatable to lateral epicondylitis, as well as a possible UCL sprain, and exam findings that are consistent with valgus extension overload.  Patient has used ibuprofen 600 mg once daily, and he is currently on gabapentin for chronic back pain.    Plan:  I have had a long discussion with Romario in terms  of his exam, x-ray findings, as well as differential diagnosis that is contributing to the pain.  I do not think the patient has findings that are mutually exclusive from 1 another, and likely has concomitant pathology presenting at the same time.  However the patient is strong, and functionally appropriate.  He is also not having red flag signs.  At this point I do think it would be most beneficial for the patient to enter hand therapy, with the expectation he is improving over the next 6 weeks, not perfect, but if not improving, we will obtain an MRI of the elbow.  I also recommend the patient take twice daily ibuprofen, to help with him occasionally having pain that will prevent him from having a restful night sleep as well as other household and lifestyle factors.  I have also agreed to a short course of Zanaflex for the patient to be used only at night as needed, the patient has had this before from his primary care physician, which has helped him sleep.  I have discussed with the patient this is not a medication that I would recommend that we continue to refill, and would recommend that he reach out to his primary care physician and follow-up with his primary care physician for further recommendations on this medication as I do not believe that the elbow pain in and of itself is substantiated for this medication in the long-term.  All questions answered.  Return precautions advised.    Options for treatment and/or follow-up care were reviewed with the patient was actively involved in the decision making process. Patient verbalized understanding and was in agreement with the plan.    Rafael Og DO  , Sports Medicine  Department of Family Medicine and [facility]      Again, thank you for allowing me to participate in the care of your patient.      Sincerely,    Rafael Og DO

## 2024-11-10 ASSESSMENT — ASTHMA QUESTIONNAIRES
QUESTION_4 LAST FOUR WEEKS HOW OFTEN HAVE YOU USED YOUR RESCUE INHALER OR NEBULIZER MEDICATION (SUCH AS ALBUTEROL): ONCE A WEEK OR LESS
QUESTION_3 LAST FOUR WEEKS HOW OFTEN DID YOUR ASTHMA SYMPTOMS (WHEEZING, COUGHING, SHORTNESS OF BREATH, CHEST TIGHTNESS OR PAIN) WAKE YOU UP AT NIGHT OR EARLIER THAN USUAL IN THE MORNING: NOT AT ALL
ACT_TOTALSCORE: 21
QUESTION_2 LAST FOUR WEEKS HOW OFTEN HAVE YOU HAD SHORTNESS OF BREATH: ONCE OR TWICE A WEEK
ACT_TOTALSCORE: 21
QUESTION_1 LAST FOUR WEEKS HOW MUCH OF THE TIME DID YOUR ASTHMA KEEP YOU FROM GETTING AS MUCH DONE AT WORK, SCHOOL OR AT HOME: NONE OF THE TIME
QUESTION_5 LAST FOUR WEEKS HOW WOULD YOU RATE YOUR ASTHMA CONTROL: SOMEWHAT CONTROLLED

## 2024-11-12 ENCOUNTER — OFFICE VISIT (OUTPATIENT)
Dept: PEDIATRICS | Facility: CLINIC | Age: 49
End: 2024-11-12
Payer: COMMERCIAL

## 2024-11-12 VITALS
OXYGEN SATURATION: 97 % | TEMPERATURE: 98 F | WEIGHT: 226.9 LBS | DIASTOLIC BLOOD PRESSURE: 83 MMHG | RESPIRATION RATE: 16 BRPM | HEIGHT: 72 IN | BODY MASS INDEX: 30.73 KG/M2 | HEART RATE: 81 BPM | SYSTOLIC BLOOD PRESSURE: 130 MMHG

## 2024-11-12 DIAGNOSIS — M54.42 CHRONIC MIDLINE LOW BACK PAIN WITH LEFT-SIDED SCIATICA: Primary | ICD-10-CM

## 2024-11-12 DIAGNOSIS — G89.29 CHRONIC MIDLINE LOW BACK PAIN WITH LEFT-SIDED SCIATICA: Primary | ICD-10-CM

## 2024-11-12 PROCEDURE — 99213 OFFICE O/P EST LOW 20 MIN: CPT | Performed by: INTERNAL MEDICINE

## 2024-11-12 RX ORDER — TIZANIDINE 2 MG/1
2-4 TABLET ORAL 3 TIMES DAILY PRN
Qty: 30 TABLET | Refills: 3 | Status: SHIPPED | OUTPATIENT
Start: 2024-11-12

## 2024-11-12 ASSESSMENT — PAIN SCALES - GENERAL: PAINLEVEL_OUTOF10: MODERATE PAIN (4)

## 2024-11-12 ASSESSMENT — ENCOUNTER SYMPTOMS: BACK PAIN: 1

## 2024-11-12 NOTE — PROGRESS NOTES
Assessment & Plan     (M54.42,  G89.29) Chronic midline low back pain with left-sided sciatica  (primary encounter diagnosis)  Comment: most recent MRI reviewed.  Recurrent LBP with left sided radicular paresthesias, foot numbness.   Referral regarding procedure options (epidural steroid, etc)  Plan: Pain Management  Referral, tiZANidine         (ZANAFLEX) 2 MG tablet         BMI  Estimated body mass index is 30.77 kg/m  as calculated from the following:    Height as of this encounter: 1.829 m (6').    Weight as of this encounter: 102.9 kg (226 lb 14.4 oz).             Dinorah Doss is a 49 year old, presenting for the following health issues:  Back Pain        11/12/2024    10:48 AM   Additional Questions   Roomed by Maura CARLOS   Accompanied by None         11/12/2024    10:48 AM   Patient Reported Additional Medications   Patient reports taking the following new medications None     Back Pain     History of Present Illness       Back Pain:  He presents for follow up of back pain. Patient's back pain is a chronic problem.  Location of back pain:  Left lower back  Description of back pain: burning, cramping, dull ache, sharp, shooting and stabbing  Back pain spreads: left buttocks    Since patient first noticed back pain, pain is: always present, but gets better and worse  Does back pain interfere with his job:  No       He eats 0-1 servings of fruits and vegetables daily.He consumes 2 sweetened beverage(s) daily.He exercises with enough effort to increase his heart rate 9 or less minutes per day.  He exercises with enough effort to increase his heart rate 3 or less days per week.   He is taking medications regularly.    Patient would like to get referral for back specialist and tizanidine prescription re-started.   When active around house and yard , triggers flare    Patient Active Problem List   Diagnosis    CARDIOVASCULAR SCREENING; LDL GOAL LESS THAN 160    Seborrheic dermatitis    Mild  persistent asthma without complication    Chronic low back pain, unspecified back pain laterality, unspecified whether sciatica present    Chronic pain in testicle    Decreased libido    Trigger finger, right middle finger     Current Outpatient Medications   Medication Sig Dispense Refill    albuterol (PROAIR HFA/PROVENTIL HFA/VENTOLIN HFA) 108 (90 Base) MCG/ACT inhaler Inhale 2 puffs into the lungs every 4 hours as needed for shortness of breath / dyspnea 3 Inhaler 3    finasteride (PROPECIA) 1 MG tablet Take 1 tablet (1 mg) by mouth daily 90 tablet 3    Fluticasone-Salmeterol (ADVAIR HFA IN)       fluticasone-salmeterol (ADVAIR) 250-50 MCG/ACT inhaler Inhale 1 puff into the lungs every 12 hours 3 each 3    gabapentin (NEURONTIN) 300 MG capsule TAKE ONE CAPSULE BY MOUTH THREE TIMES DAILY AS NEEDED 270 capsule 3    ibuprofen (ADVIL/MOTRIN) 600 MG tablet Take 1 tablet (600 mg) by mouth every evening for 14 days. 14 tablet 0    minoxidil (LONITEN) 2.5 MG tablet Take 1 tablet (2.5 mg) by mouth daily 90 tablet 3    sildenafil (VIAGRA) 50 MG tablet TAKE ONE TO TWO TABLETS BY MOUTH DAILY AS NEEDED  FOR ED 60 tablet 0    tiZANidine (ZANAFLEX) 2 MG tablet Take 1-2 tablets (2-4 mg) by mouth 3 times daily as needed for muscle spasms. 30 tablet 3    finasteride (PROSCAR) 5 MG tablet Take 1/4 tab daily 30 tablet 2    ibuprofen (ADVIL/MOTRIN) 200 MG tablet Take 800 mg by mouth every 4 hours as needed for mild pain (Patient not taking: Reported on 11/12/2024)                 Objective    /83 (BP Location: Right arm, Patient Position: Sitting, Cuff Size: Adult Regular)   Pulse 81   Temp 98  F (36.7  C) (Temporal)   Resp 16   Ht 1.829 m (6')   Wt 102.9 kg (226 lb 14.4 oz)   SpO2 97%   BMI 30.77 kg/m    Body mass index is 30.77 kg/m .  Physical Exam   GENERAL: alert and no distress  SKIN: no suspicious lesions or rashes  PSYCH: mentation appears normal, affect normal/bright            Signed Electronically by: Daniel WILL  MD Lizette

## 2024-11-25 ENCOUNTER — THERAPY VISIT (OUTPATIENT)
Dept: OCCUPATIONAL THERAPY | Facility: CLINIC | Age: 49
End: 2024-11-25
Attending: STUDENT IN AN ORGANIZED HEALTH CARE EDUCATION/TRAINING PROGRAM
Payer: COMMERCIAL

## 2024-11-25 DIAGNOSIS — G89.29 CHRONIC ELBOW PAIN, LEFT: ICD-10-CM

## 2024-11-25 DIAGNOSIS — M25.522 CHRONIC ELBOW PAIN, LEFT: ICD-10-CM

## 2024-11-25 PROCEDURE — 97165 OT EVAL LOW COMPLEX 30 MIN: CPT | Mod: GO

## 2024-11-25 PROCEDURE — 97530 THERAPEUTIC ACTIVITIES: CPT | Mod: GO
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
## 2024-12-10 ENCOUNTER — THERAPY VISIT (OUTPATIENT)
Dept: OCCUPATIONAL THERAPY | Facility: CLINIC | Age: 49
End: 2024-12-10
Attending: STUDENT IN AN ORGANIZED HEALTH CARE EDUCATION/TRAINING PROGRAM
Payer: COMMERCIAL

## 2024-12-10 DIAGNOSIS — G89.29 CHRONIC ELBOW PAIN, LEFT: Primary | ICD-10-CM

## 2024-12-10 DIAGNOSIS — M25.522 CHRONIC ELBOW PAIN, LEFT: Primary | ICD-10-CM

## 2024-12-10 PROCEDURE — 97112 NEUROMUSCULAR REEDUCATION: CPT | Mod: GO | Performed by: OCCUPATIONAL THERAPIST

## 2024-12-10 PROCEDURE — 97035 APP MDLTY 1+ULTRASOUND EA 15: CPT | Mod: GO | Performed by: OCCUPATIONAL THERAPIST

## 2024-12-16 ENCOUNTER — THERAPY VISIT (OUTPATIENT)
Dept: OCCUPATIONAL THERAPY | Facility: CLINIC | Age: 49
End: 2024-12-16
Payer: COMMERCIAL

## 2024-12-16 DIAGNOSIS — M25.522 CHRONIC ELBOW PAIN, LEFT: Primary | ICD-10-CM

## 2024-12-16 DIAGNOSIS — G89.29 CHRONIC ELBOW PAIN, LEFT: Primary | ICD-10-CM

## 2024-12-16 PROCEDURE — 97760 ORTHOTIC MGMT&TRAING 1ST ENC: CPT | Mod: GO | Performed by: OCCUPATIONAL THERAPIST

## 2024-12-16 PROCEDURE — 97112 NEUROMUSCULAR REEDUCATION: CPT | Mod: GO | Performed by: OCCUPATIONAL THERAPIST

## 2024-12-23 ENCOUNTER — THERAPY VISIT (OUTPATIENT)
Dept: OCCUPATIONAL THERAPY | Facility: CLINIC | Age: 49
End: 2024-12-23
Payer: COMMERCIAL

## 2024-12-23 DIAGNOSIS — M25.522 CHRONIC ELBOW PAIN, LEFT: Primary | ICD-10-CM

## 2024-12-23 DIAGNOSIS — G89.29 CHRONIC ELBOW PAIN, LEFT: Primary | ICD-10-CM

## 2024-12-23 PROCEDURE — 97112 NEUROMUSCULAR REEDUCATION: CPT | Mod: GO | Performed by: OCCUPATIONAL THERAPIST

## 2024-12-23 PROCEDURE — 97110 THERAPEUTIC EXERCISES: CPT | Mod: GO | Performed by: OCCUPATIONAL THERAPIST

## 2024-12-30 ENCOUNTER — OFFICE VISIT (OUTPATIENT)
Dept: FAMILY MEDICINE | Facility: CLINIC | Age: 49
End: 2024-12-30
Payer: COMMERCIAL

## 2024-12-30 VITALS
HEART RATE: 74 BPM | OXYGEN SATURATION: 98 % | WEIGHT: 239 LBS | BODY MASS INDEX: 32.37 KG/M2 | TEMPERATURE: 97.5 F | DIASTOLIC BLOOD PRESSURE: 87 MMHG | SYSTOLIC BLOOD PRESSURE: 123 MMHG | RESPIRATION RATE: 18 BRPM | HEIGHT: 72 IN

## 2024-12-30 DIAGNOSIS — Z71.84 ENCOUNTER FOR COUNSELING FOR TRAVEL: Primary | ICD-10-CM

## 2024-12-30 DIAGNOSIS — Z23 NEED FOR DIPHTHERIA-TETANUS-PERTUSSIS (TDAP) VACCINE: ICD-10-CM

## 2024-12-30 PROCEDURE — 90715 TDAP VACCINE 7 YRS/> IM: CPT | Mod: GA | Performed by: PHYSICIAN ASSISTANT

## 2024-12-30 PROCEDURE — 90471 IMMUNIZATION ADMIN: CPT | Mod: GA | Performed by: PHYSICIAN ASSISTANT

## 2024-12-30 PROCEDURE — 90589 CHIKUNGUNYA VACCINE LIVE IM: CPT | Mod: GA | Performed by: PHYSICIAN ASSISTANT

## 2024-12-30 PROCEDURE — 96372 THER/PROPH/DIAG INJ SC/IM: CPT | Mod: GA | Performed by: PHYSICIAN ASSISTANT

## 2024-12-30 PROCEDURE — 99401 PREV MED CNSL INDIV APPRX 15: CPT | Mod: 25 | Performed by: PHYSICIAN ASSISTANT

## 2024-12-30 RX ORDER — AZITHROMYCIN 500 MG/1
TABLET, FILM COATED ORAL
Qty: 3 TABLET | Refills: 0 | Status: SHIPPED | OUTPATIENT
Start: 2024-12-30

## 2024-12-30 NOTE — PROGRESS NOTES
SUBJECTIVE: Romario Lizarraga , a 49 year old  male, presents for counseling and information regarding upcoming travel to [facility]. Special medical concerns include: none. He anticipates the following unusual exposures: none.    Itinerary:  [facility].    Departure Date: 03/19/2025 Return date: 03/30/2025    Reason for travel (i.e. Business, pleasure): Pleasure    Visiting an urban or rural area?: both    Accommodations (i.e. hotel, hostel, friends, family, etc): hotel    Women - First day of your last period: N/A    IMMUNIZATION HISTORY  Have you received any vaccinations in the past 4 weeks? If so, which? No  Have you ever fainted from having your blood drawn or from an injection?  No  Have you ever had any bad reaction or side effect from any vaccination?  If so, which? No  Do you live (or work closely) with anyone who has AIDS, an AIDS-like condition, any other immune disorder or who is on chemotherapy for cancer?  No  Have you received any injection of immune globulin or any blood products during the past 12 months?  No    GENERAL MEDICAL HISTORY  Do you have a medical condition that requires medicine or doctor follow-up visits?  Yes  Do you have a medical condition that is stable now, but that may recur while traveling?  Yes  Has your spleen been removed?  No  Have you had an illness or a fever in the past 48 hours?  No  Are you pregnant, or might you become pregnant on this trip?  Any chance of pregnancy?  No  Are you breastfeeding?  No  Do you have HIV, AIDS, an AIDS-like condition, any other immune disorder, leukemia or cancer?  No  Have you had your thymus gland removed or history of problems with your thymus, such as myasthenia gravis, DiGeorge syndrome, or thymoma?  No  Do you have a severely low platelet count (thrombocytopenia) or a blood clotting disorder?  No  Have you ever had a convulsion, seizure, epilepsy, neurologic condition or brain infection?  No  Do you have any stomach  conditions?  No  Do you have severe renal or kidney impairment?  No  Do you have a history of mental health concerns?  No  Do you get yeast infections often?  No  Do you have psoriasis?  No  Do you get motion sickness?  No  Have you ever had headaches, nausea, vomiting, or breathing problems from altitude exposure?  No    MEDICINES  Are you taking:   Steroids, prednisone, anti-cancer drugs, or medicines that suppress your immune system? No  Antibiotics or sulfonamides? No  Oral contraceptives (birth control pills)? No  Aspirin therapy (children and teens)? No    ALLERGIES  Are you allergic to:  Any medicines? No  Any foods or other? No  Neomycin, formalin, or fish products? No      Past Medical History:   Diagnosis Date    Low testosterone 12/11/2013      Immunization History   Administered Date(s) Administered    COVID-19 12+ (MODERNA) 10/16/2024    COVID-19 12+ (Pfizer) 10/13/2023    COVID-19 Bivalent 18+ (Moderna) 09/26/2022    COVID-19 MONOVALENT 12+ (Pfizer) 03/21/2021, 04/11/2021, 10/15/2021    Flu, Unspecified 09/27/2015    HEPA 03/13/2007, 11/27/2007    HepB 03/13/2007, 11/27/2007, 03/10/2009    Hepatitis A (ADULT 19+) 03/13/2007, 11/27/2007    Hepatitis B, Adult 03/13/2007, 11/27/2007, 03/10/2009    Influenza (H1N1) 01/29/2010    Influenza (IIV3) PF 03/09/2007, 11/27/2007, 10/24/2008, 03/31/2011, 12/11/2013, 09/21/2014    Influenza (prior to 2024) 01/29/2010    Influenza Vaccine (Flucelvax Quadrivalent) 09/24/2021    Influenza Vaccine >6 months,quad, PF 12/11/2013, 11/15/2016, 08/30/2018, 09/14/2022    Influenza Vaccine, 6+MO IM (QUADRIVALENT W/PRESERVATIVES) 09/27/2015    Influenza,INJ,MDCK,PF,Quad >6mo(Flucelvax) 09/26/2017, 09/12/2019, 10/15/2020    MMR 01/02/2013    Meningococcal (Menomune ) 03/10/2009    Meningococcal ACWY (Menactra ) 03/10/2009    Pneumococcal 23 valent 08/07/2014    Poliovirus, inactivated (IPV) 03/13/2007    Rabies Vaccine 12/15/2015, 12/22/2015, 01/05/2016    Rabies Vaccine  (Imovax) 02/13/2024    Rabies Vaccine (Rabavert) 12/15/2015, 12/22/2015, 01/05/2016    TDAP (Adacel,Boostrix) 03/09/2007    TDAP Vaccine (Adacel) 08/07/2014    Typhoid IM 03/13/2007, 01/02/2013    Typhoid Oral 03/13/2007, 12/15/2015, 02/13/2024    Typhoid, Unspecified Formulation 01/02/2013    Yellow Fever 01/02/2013, 02/13/2024       Current Outpatient Medications   Medication Sig Dispense Refill    albuterol (PROAIR HFA/PROVENTIL HFA/VENTOLIN HFA) 108 (90 Base) MCG/ACT inhaler Inhale 2 puffs into the lungs every 4 hours as needed for shortness of breath / dyspnea 3 Inhaler 3    finasteride (PROPECIA) 1 MG tablet Take 1 tablet (1 mg) by mouth daily 90 tablet 3    Fluticasone-Salmeterol (ADVAIR HFA IN)       fluticasone-salmeterol (ADVAIR) 250-50 MCG/ACT inhaler Inhale 1 puff into the lungs every 12 hours 3 each 3    gabapentin (NEURONTIN) 300 MG capsule TAKE ONE CAPSULE BY MOUTH THREE TIMES DAILY AS NEEDED 270 capsule 3    minoxidil (LONITEN) 2.5 MG tablet Take 1 tablet (2.5 mg) by mouth daily 90 tablet 3    sildenafil (VIAGRA) 50 MG tablet TAKE ONE TO TWO TABLETS BY MOUTH DAILY AS NEEDED  FOR ED 60 tablet 0    tiZANidine (ZANAFLEX) 2 MG tablet Take 1-2 tablets (2-4 mg) by mouth 3 times daily as needed for muscle spasms. 30 tablet 3    finasteride (PROSCAR) 5 MG tablet Take 1/4 tab daily 30 tablet 2    ibuprofen (ADVIL/MOTRIN) 200 MG tablet Take 800 mg by mouth every 4 hours as needed for mild pain (Patient not taking: Reported on 12/30/2024)       No Known Allergies     EXAM: deferred    Immunizations discussed include: Tetanus/Diphtheria and chikungunya  Malaria prophylaxis recommended: not needed  Symptomatic treatment for traveler's diarrhea: bismuth subsalicylate, loperamide/diphenoxylate, and azithromycin    ASSESSMENT/PLAN:    (Z71.84) Encounter for counseling for travel  (primary encounter diagnosis)    Comment: Ixchiq and tdap vaccines today. Patient will return or follow-up with PCP as needed. Prophylaxis  given for Traveler's diarrhea and is not needed for Malaria. All questions were answered.     Plan: azithromycin (ZITHROMAX) 500 MG tablet,         chikungunya virus vaccine (live) (IXCHIQ)         injection 0.5 mL            (Z23) Need for diphtheria-tetanus-pertussis (Tdap) vaccine  Comment:   Plan: TDAP 7+ (ADACEL,BOOSTRIX)              I have reviewed general recommendations for safe travel   including: food/water precautions, insect avoidance, safe sex   practices given high prevalence of HIV and other STDs,   roadway safety. Educational materials and links to the CDC   Traveler's health website have been provided.    Total time 12 minutes, greater than 50 percent in counseling   and coordination of care.

## 2024-12-30 NOTE — PATIENT INSTRUCTIONS
"See travel packet provided  Recommend ultrathon (mosquito repellant), pepto bismol and imodium  The food and drink choices you make while traveling can impact your likelihood of getting sick.   If you aren't sure if a food or drink is safe, the saying \" BOIL IT, COOK IT, PEEL IT, OR FORGET IT\" can help you decide whether it's okay to consume.   Also bring hand  and sun screen with you.  Safe Travels     If you first start to get mild to moderate diarrhea, take imodium.      If diarrhea is severe or you have a fever with the diarrhea, take the antibiotic (azithromycin).      Today December 30, 2024 you received the    Tetanus (Tdap) Vaccine    Chikungunya.    These appointments can be made as a NURSE ONLY visit.    **It is very important for the vaccinations to be given on the scheduled day(s), this helps ensure you receive the full effectiveness of the vaccine.**    Please call [facility] with any questions [phone]    Thank you for visiting [facility]    "

## 2024-12-30 NOTE — PROGRESS NOTES
Prior to immunization administration, verified patients identity using patient s name and date of birth. Please see Immunization Activity for additional information.     Screening Questionnaire for Adult Immunization    Are you sick today?   No   Do you have allergies to medications, food, a vaccine component or latex?   No   Have you ever had a serious reaction after receiving a vaccination?   No   Do you have a long-term health problem with heart, lung, kidney, or metabolic disease (e.g., diabetes), asthma, a blood disorder, no spleen, complement component deficiency, a cochlear implant, or a spinal fluid leak?  Are you on long-term aspirin therapy?   Yes   Do you have cancer, leukemia, HIV/AIDS, or any other immune system problem?   No   Do you have a parent, brother, or sister with an immune system problem?   No   In the past 3 months, have you taken medications that affect  your immune system, such as prednisone, other steroids, or anticancer drugs; drugs for the treatment of rheumatoid arthritis, Crohn s disease, or psoriasis; or have you had radiation treatments?   No   Have you had a seizure, or a brain or other nervous system problem?   No   During the past year, have you received a transfusion of blood or blood    products, or been given immune (gamma) globulin or antiviral drug?   No   For women: Are you pregnant or is there a chance you could become       pregnant during the next month?   No   Have you received any vaccinations in the past 4 weeks?   No
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
## 2025-01-30 ENCOUNTER — OFFICE VISIT (OUTPATIENT)
Dept: PALLIATIVE MEDICINE | Facility: CLINIC | Age: 50
End: 2025-01-30
Attending: INTERNAL MEDICINE
Payer: COMMERCIAL

## 2025-01-30 VITALS — HEART RATE: 96 BPM | OXYGEN SATURATION: 98 % | DIASTOLIC BLOOD PRESSURE: 97 MMHG | SYSTOLIC BLOOD PRESSURE: 142 MMHG

## 2025-01-30 DIAGNOSIS — M54.42 CHRONIC MIDLINE LOW BACK PAIN WITH LEFT-SIDED SCIATICA: Primary | ICD-10-CM

## 2025-01-30 DIAGNOSIS — G89.29 CHRONIC MIDLINE LOW BACK PAIN WITH LEFT-SIDED SCIATICA: Primary | ICD-10-CM

## 2025-01-30 DIAGNOSIS — M47.896 OTHER SPONDYLOSIS, LUMBAR REGION: ICD-10-CM

## 2025-01-30 ASSESSMENT — PAIN SCALES - GENERAL: PAINLEVEL_OUTOF10: MODERATE PAIN (5)

## 2025-01-30 NOTE — PATIENT INSTRUCTIONS
I am recommending a Lumbar Radiofrequency Ablation - we have to do a test injection first to see if this is indicated or not.   We will call you to schedule this once we have a prior authorization from your insurance company.     Trudy Palacios MD       ----------------------------------------------------------------  Clinic Number:  [phone] ask to speak with Dr. Rose nurse  Call with any questions about your care and for scheduling assistance.   Calls are returned Monday through Friday between 8 AM and 4:30 PM. We usually get back to you within 2 business days depending on the issue/request.    If we are prescribing your medications:  For opioid medication refills, call the clinic or send a CallYourPrice message 7 days in advance.  Please include:  Name of requested medication  Name of the pharmacy.  For non-opioid medications, call your pharmacy directly to request a refill. Please allow 3-4 days to be processed.   Per [place] State Law:  All controlled substance prescriptions must be filled within 30 days of being written.    For those controlled substances allowing refills, pickup must occur within 30 days of last fill.      We believe regular attendance is key to your success in our program!    Any time you are unable to keep your appointment we ask that you call us at least 24 hours in advance to cancel.This will allow us to offer the appointment time to another patient.   Multiple missed appointments may lead to dismissal from the clinic.

## 2025-02-04 ENCOUNTER — TELEPHONE (OUTPATIENT)
Dept: PALLIATIVE MEDICINE | Facility: CLINIC | Age: 50
End: 2025-02-04

## 2025-02-04 DIAGNOSIS — M47.816 SPONDYLOSIS OF LUMBAR REGION WITHOUT MYELOPATHY OR RADICULOPATHY: Primary | ICD-10-CM

## 2025-02-04 NOTE — TELEPHONE ENCOUNTER
Screening questions for MBB Injections:    Injection to be done at which interventional clinic site? [facility]    Procedure ordered by Dr. Palacios    Procedure ordered? Lumbar Medial Branch Block    What insurance would patient like us to bill for this procedure? [facility]    MEDICA: REQUIRES A PA FOR BOTH MBB   Worker's comp- Any injection DO NOT SCHEDULE and route to Prema Bee.    [employer] insurance - ANY INJECTION, DO NOT SCHEDULE and route to Renetta Abraham.     MBBs must be scheduled with elapsed time interval of at least 2 weeks and not more than 6 months between the First MBB and the Second MBB for insurance purposes     [employer] - Any injection besides hip/shoulder/knee joint DO NOT SCHEDULE and route to Renetta Abraham. She will obtain PA and call pt back to schedule procedure or notify pt of denial.     [employer]- PA required for all MBB's    **BCBS- ALL need to be routed to Renetta for review if a PA is needed**  IF SCHEDULING IN [place] PAIN OR SPINE PLEASE SCHEDULE AT LEAST 7-10         BUSINESS DAYS OUT SO A PA CAN BE OBTAINED    Genicular Nerve blocks- ALL insurances except for- [employer], Medicare (straight not supplement) and [employer]. Need to be reviewed by Renetta before scheduling.       Is patient scheduled at [facility]? no   If YES, route every encounter to [facility]INE CENTER CARE NAVIGATION POOL [[phone]]    Any chance of pregnancy? NO   If YES, do NOT schedule and route to RN pool  - Dr. Mas route to Rosita Spears and PM&R Nurse  [[id number]]      Is an  needed? No     Patient has a drive home? (mandatory) Yes     Is patient taking any blood thinners (plavix, coumadin, jantoven, warfarin, heparin, pradaxa or dabigatran )? No    If hold needed, do NOT schedule, route to RN pool/ Dr. Mas's Team     Does the patient have a bleeding or clotting disorder? No  If YES, okay to schedule AND route to RN nurse osei/ Dr. Msa's Team  **For any patients with  platelet count <100, must be forwarded to provider**    Is patient diabetic? no If YES, have them bring their glucometer.    Does patient have an active infection or treated for one within the past week? No    Is patient currently taking any antibiotics?  No  For patients on chronic, preventative, or prophylactic antibiotics, procedures may be scheduled.   For patients on antibiotics for active or recent infection:antibiotic course must have been completed for 4 days    Is patient currently taking any steroid medications? (i.e. Prednisone, Medrol)  No   For patients on steroid medications, course must have been completed for 4 days    Is patient actively being treated for cancer or immunocompromised? No   If YES, do NOT schedule and route to RN/ Dr. Mas's Team    Are you able to get on and off an exam table with minimal or no assistance? Yes   If NO, do NOT schedule and route to RN/ Dr. Mas's Team    Are you able to roll over and lay on your stomach with minimal or no assistance? Yes   If NO, do NOT schedule and route to RN/ Dr. Mas's Team    Any allergies to contrast dye, iodine, shellfish, or numbing and steroid medications? No  (If so, inform nursing and note in scheduling comments.)    Allergies: Patient has no known allergies.     Does patient have an MRI/CT?  Not Applicable  Check Procedure Scheduling Grid to see if required.    Was the MRI done within the last 3 years?  NA  If yes, where was the MRI done i.e.[facility], [facility], [facility], [facility] etc?     If no, do not schedule and route to nursing/ Dr. Mas's Team  If MRI was not done at [facility], [facility] do NOT schedule and route to nursing.    If pt has an imaging disc, the injection MAY be scheduled but pt has to bring disc to appt.   If they show up without the disc the injection cannot be done    Is patient able to transfer to a procedure table with minimal or no assistance? Yes  If NO, do NOT schedule and route to  RN/ Dr. Mas's Team    Medial Branch Block Pre-Procedure Instructions  It is okay to take long acting pain medications (if you are on them) the day of the procedure but try not to take any short acting medications unless absolutely necessary.    YES: ok   Long acting meds would include: Gabapentin (Neurontin), MS Contin, Oxycontin        Short acting meds would include:  Percocet, Oxycodone, Vicodin, Ibuprofen   The day of the procedure, you should try to do things that provoke your pain, since the injection is being done to see if it will relieve your pain . YES: ok   If your pain level is a 4 out of 10 or less on the day of the procedu re, please call [phone] to reschedule.  YES: ok     Reminders:    If you are started on any steroids or antibiotics between now and your appointment, you must contact us because it may affect our ability to perform your procedure ok    Instructed pt to arrive 30 minutes early for IV start if required. (Check Procedure Scheduling Grid) JAZMIN     If this is for a cervical MBB aspirin needs to be held for 6 days.  NO     Do not schedule procedures requiring IV placement in the first appointment of the day or first appointment after lunch. Do NOT schedule at 0745, 0815 or 1245.  ok    For patients 85 or older we recommend having an adult stay w/ them for the remainder of the day.      Does the patient have any questions? no

## 2025-02-05 NOTE — PROGRESS NOTES
[facility]  Sports Medicine Clinic  Clinics and Surgery Center           SUBJECTIVE       Romario Lizarraga is a 49 year old male presenting to clinic today. Today, the patient reports that his left knee pain started flaring up about 4 weeks ago. He states he did tweak the knee while bumping into his ottoman and feels that this is what caused his worsening pain. As for the left elbow, he has intermittent lateral elbow pain. It is worse when he bumps it, but feels like it is improving.     7/18/24: Osteoarthritis of left patellofemoral joint  -     Physical Therapy  Referral; Future     Impingement syndrome involving patellar fat pad of left knee  -     Physical Therapy  Referral; Future     Patellar maltracking, left  -     Physical Therapy  Referral; Future        Romario is a very pleasant 48-year-old male presenting to clinic today for follow-up via telephone to discuss the findings on his MRI.  I had a long discussion with Romario in terms of the pathology that was seen on his MRI, consistent with that of patellar maltracking and fat pad impingement likely secondary to biomechanics.  Because this is also led to grade IV chondromalacia/patellofemoral OA of the left knee joint.  Patient does have a baseline pain of about 3, and is having some difficulty with squatting and performing hobbies he enjoys.  Because of that, I do recommend biomechanical retraining and strengthening with physical therapy.  He is amenable to this.  Order has been placed.  Because of the pain, I also recommend a corticosteroid injection over the coming weeks for hopeful successful resolution of his pain patterns.  Thereafter, I would like to see him 6 weeks after starting physical therapy to assess his progress.  All questions have been answered.       PMH, Medications and Allergies were reviewed and updated as needed.    ROS:  As noted above otherwise negative.    Patient Active Problem List   Diagnosis     CARDIOVASCULAR SCREENING; LDL GOAL LESS THAN 160    Seborrheic dermatitis    Mild persistent asthma without complication    Chronic low back pain, unspecified back pain laterality, unspecified whether sciatica present    Chronic pain in testicle    Decreased libido    Trigger finger, right middle finger    Chronic elbow pain, left       Current Outpatient Medications   Medication Sig Dispense Refill    albuterol (PROAIR HFA/PROVENTIL HFA/VENTOLIN HFA) 108 (90 Base) MCG/ACT inhaler Inhale 2 puffs into the lungs every 4 hours as needed for shortness of breath / dyspnea 3 Inhaler 3    azithromycin (ZITHROMAX) 500 MG tablet Take one tablet daily for up to 3 days as needed for traveler's diarrhea 3 tablet 0    finasteride (PROPECIA) 1 MG tablet Take 1 tablet (1 mg) by mouth daily 90 tablet 3    Fluticasone-Salmeterol (ADVAIR HFA IN)       fluticasone-salmeterol (ADVAIR) 250-50 MCG/ACT inhaler Inhale 1 puff into the lungs every 12 hours 3 each 3    gabapentin (NEURONTIN) 300 MG capsule TAKE ONE CAPSULE BY MOUTH THREE TIMES DAILY AS NEEDED 270 capsule 3    ibuprofen (ADVIL/MOTRIN) 200 MG tablet Take 800 mg by mouth every 4 hours as needed for mild pain.      minoxidil (LONITEN) 2.5 MG tablet Take 1 tablet (2.5 mg) by mouth daily 90 tablet 3    sildenafil (VIAGRA) 50 MG tablet TAKE ONE TO TWO TABLETS BY MOUTH DAILY AS NEEDED  FOR ED 60 tablet 0    tiZANidine (ZANAFLEX) 2 MG tablet Take 1-2 tablets (2-4 mg) by mouth 3 times daily as needed for muscle spasms. 30 tablet 3            OBJECTIVE:       Vitals: There were no vitals filed for this visit.  BMI: There is no height or weight on file to calculate BMI.    Gen:  Well nourished and in no acute distress  HEENT: Extraocular movement intact  Neck: Supple  Pulm:  Breathing Comfortably. No increased respiratory effort.  Psych: Euthymic. Appropriately answers questions    MSK: Left knee without an effusion.  No overlying erythema or warmth.  No blotting.  Full range of  motion.  Very minimal joint line tenderness, no soft tissue tenderness about the quad or patellar tendon.  No tenderness at the MCL or LCL.  No crepitus with range of motion.  Strength testing is appropriate.  Negative Lachman and posterior drawer, varus or valgus stress testing, and Apley grind.  Negative patellar apprehension or compression.      Study Result    Narrative & Impression   MR left knee without contrast 7/8/2024 8:41 AM     Techniques: Multiplanar multisequence imaging of the left knee was  obtained without administration of intra-articular or intravenous  contrast using routing protocol.     History: Meniscal disruption; Internal derangement of left knee     Additional History from EMR: Injury planting leg well kicking a soccer  ball in mid May. Positive medial Luis and Apley grind on exam.     Comparison: 6/27/2016 knee MRI     Findings:     MENISCI:  Medial meniscus: Intact.  Lateral meniscus: Intact.     LIGAMENTS  Cruciate ligaments: Mildly increased intrasubstance signal in the  distal ACL.  Medial supporting structures: Intact.  Lateral supporting structures: Intact.     EXTENSOR MECHANISM  Intact. Edema deep and superficial to the distal patellar tendon.  Superolateral Hoffa's fat pad edema.     FLUID  Trace joint effusion.      OSSEOUS and ARTICULAR STRUCTURES  Bones: Unfused apophysis with bony fragmentation at the tibial  tubercle. Edema within the apophysis.     Patellofemoral compartment: Full-thickness cartilage ulcerations with  associated bone marrow edema at the lateral patellar facet and the  lateral aspect of the trochlea. Focal fissuring at the median ridge  facet with focal bone marrow hyperintensity.     Medial compartment: Multifocal superficial fissuring.     Lateral compartment: Multifocal superficial fissuring.     ANCILLARY FINDINGS  Varicosities along the anterior medial subcutaneous tissues. Reactive  edema in the fatty tissues in the intracondylar notch.                                                                       Impression:  1. Grade 4 chondromalacia of the patellofemoral joint, progressed  compared to 6/27/2016.  2. Superior-lateral Hoffa's fat pad edema and trochlear cartilage  abnormalities with edema in the trochlea, consistent with patellar  maltracking.  3. Edema deep and superficial to the distal patellar tendon with  associated edema and the unfused apophysis consistent with  Osgood-Schlatter disease.  4. Mild increased intrasubstance signal within the ACL, likely  sequelae of prior low-grade sprain.     I have personally reviewed the examination and initial interpretation  and I agree with the finding             ASSESSMENT and PLAN:     Romario was seen today for pain.    Diagnoses and all orders for this visit:    Osteoarthritis of left patellofemoral joint    Chronic elbow pain, left    Other orders  -     Large Joint Injection: L knee joint      Romario is a 49-year-old male returning to clinic today for the left elbow, as well as left knee.  Overall the patient's elbow is doing very well, and he is not having any remaining pain.  However his left knee is having more pain after he tripped, did not fall on the knee, but did elongate his leg, which is led to more pain in the knee.  His physical exam is rather reassuring and he does not have any ligamentous instability or mechanical symptoms.  His pain pattern today for the knee does appear to be secondary to the osteoarthrosis, he is interested in an injection.  See below procedure note for that.  In regards to the left elbow, I would like him to continue on the conservative management that we have set forth.  He can follow-up in 3 months or more for the left knee, if knee injection is needed.  Earlier as needed as well.  All questions answered return precautions advised.    Options for treatment and/or follow-up care were reviewed with the patient was actively involved in the decision making process. Patient  verbalized understanding and was in agreement with the plan.    Rafael Og DO  , Sports Medicine  Department of [facility] and [facility]    PROCEDURE    Knee Injection - Intraarticular  The patient was informed of the risks and the benefits of the procedure and a written consent was signed.  The patient s left knee was prepped with chlorhexidine in sterile fashion.   40 mg of triamcinolone suspension was drawn up into a 5 mL syringe with 4 mL of 1% lidocaine.  Topical ethyl chloride was used as an anesthetic.  Injection was performed using substerile technique.  A 1.5-inch 22-gauge needle was used to enter the anterolateral aspect of the left knee by landmark guidance.  Injection performed successfully without difficulty.  There were no complications. The patient tolerated the procedure well. There was negligible bleeding. Band-Aid applied after.   The patient was instructed to ice the knee upon leaving clinic and refrain from overuse over the next 3-5 days.   The patient was instructed to call or go to the emergency room with any unusual pain, swelling, redness, or if otherwise concerned.        Large Joint Injection: L knee joint    Date/Time: 2/6/2025 9:14 AM    Performed by: Rafael Og DO  Authorized by: Rafael Og DO    Indications:  Pain  Needle Size comment:  23 G  Guidance: landmark guided    Approach:  Superolateral  Location:  Knee      Medications:  40 mg triamcinolone 40 MG/ML; 4 mL lidocaine (PF) 1 %  Outcome:  Tolerated well, no immediate complications  Procedure discussed: discussed risks, benefits, and alternatives    Consent Given by:  Patient  Timeout: timeout called immediately prior to procedure    Prep: patient was prepped and draped in usual sterile fashion

## 2025-02-06 ENCOUNTER — OFFICE VISIT (OUTPATIENT)
Dept: ORTHOPEDICS | Facility: CLINIC | Age: 50
End: 2025-02-06
Payer: COMMERCIAL

## 2025-02-06 DIAGNOSIS — G89.29 CHRONIC ELBOW PAIN, LEFT: ICD-10-CM

## 2025-02-06 DIAGNOSIS — M25.522 CHRONIC ELBOW PAIN, LEFT: ICD-10-CM

## 2025-02-06 DIAGNOSIS — M17.12 OSTEOARTHRITIS OF LEFT PATELLOFEMORAL JOINT: Primary | ICD-10-CM

## 2025-02-06 RX ORDER — TRIAMCINOLONE ACETONIDE 40 MG/ML
40 INJECTION, SUSPENSION INTRA-ARTICULAR; INTRAMUSCULAR
Status: COMPLETED | OUTPATIENT
Start: 2025-02-06 | End: 2025-02-06

## 2025-02-06 RX ORDER — LIDOCAINE HYDROCHLORIDE 10 MG/ML
4 INJECTION, SOLUTION EPIDURAL; INFILTRATION; INTRACAUDAL; PERINEURAL
Status: COMPLETED | OUTPATIENT
Start: 2025-02-06 | End: 2025-02-06

## 2025-02-06 RX ADMIN — LIDOCAINE HYDROCHLORIDE 4 ML: 10 INJECTION, SOLUTION EPIDURAL; INFILTRATION; INTRACAUDAL; PERINEURAL at 09:14

## 2025-02-06 RX ADMIN — TRIAMCINOLONE ACETONIDE 40 MG: 40 INJECTION, SUSPENSION INTRA-ARTICULAR; INTRAMUSCULAR at 09:14

## 2025-02-06 NOTE — NURSING NOTE
[address] [address]  Dept: [phone]  ______________________________________________________________________________    Patient: Romario Lizarraga   : 1975   MRN: [id number]   2025    INVASIVE PROCEDURE SAFETY CHECKLIST    Date: 2025   Procedure: Left knee cortisone injection   Patient Name: Romario Lizarraga  MRN: [id number]  YOB: 1975    Action: Complete sections as appropriate. Any discrepancy results in a HARD COPY until resolved.     PRE PROCEDURE:  Patient ID verified with 2 identifiers (name and  or MRN): Yes  Procedure and site verified with patient/designee (when able): Yes  Accurate consent documentation in medical record: Yes  H&P (or appropriate assessment) documented in medical record: Yes  H&P must be up to 20 days prior to procedure and updates within 24 hours of procedure as applicable: NA  Relevant diagnostic and radiology test results appropriately labeled and displayed as applicable: Yes  Procedure site(s) marked with provider initials: NA    TIMEOUT:  Time-Out performed immediately prior to starting procedure, including verbal and active participation of all team members addressing the following:Yes  * Correct patient identify  * Confirmed that the correct side and site are marked  * An accurate procedure consent form  * Agreement on the procedure to be done  * Correct patient position  * Relevant images and results are properly labeled and appropriately displayed  * The need to administer antibiotics or fluids for irrigation purposes during the procedure as applicable   * Safety precautions based on patient history or medication use    DURING PROCEDURE: Verification of correct person, site, and procedures any time the responsibility for care of the patient is transferred to another member of the care team.       Prior to injection, verified patient identity using patient's  name and date of birth.  Due to injection administration, patient instructed to remain in clinic for 15 minutes  afterwards, and to report any adverse reaction to me immediately.    Joint injection was performed.      Drug Amount Wasted:  Yes: 1 mg/ml   Vial/Syringe: Single dose vial  Expiration Date:  07/2028      Kimberly Zamorano, [facility]  February 6, 2025

## 2025-02-06 NOTE — LETTER
2/6/2025      RE: Romario Lizarraga  [address]  [facility] [address]     Dear Colleague,    Thank you for referring your patient, Romario Lizarraga, to the [facility] [place]. Please see a copy of my visit note below.    [facility]  Sports Medicine Clinic  Clinics and Surgery Center           SUBJECTIVE       Romario Lizarraga is a 49 year old male presenting to clinic today. Today, the patient reports that his left knee pain started flaring up about 4 weeks ago. He states he did tweak the knee while bumping into his ottoman and feels that this is what caused his worsening pain. As for the left elbow, he has intermittent lateral elbow pain. It is worse when he bumps it, but feels like it is improving.     7/18/24: Osteoarthritis of left patellofemoral joint  -     Physical Therapy  Referral; Future     Impingement syndrome involving patellar fat pad of left knee  -     Physical Therapy  Referral; Future     Patellar maltracking, left  -     Physical Therapy  Referral; Future        Romario is a very pleasant 48-year-old male presenting to clinic today for follow-up via telephone to discuss the findings on his MRI.  I had a long discussion with Romario in terms of the pathology that was seen on his MRI, consistent with that of patellar maltracking and fat pad impingement likely secondary to biomechanics.  Because this is also led to grade IV chondromalacia/patellofemoral OA of the left knee joint.  Patient does have a baseline pain of about 3, and is having some difficulty with squatting and performing hobbies he enjoys.  Because of that, I do recommend biomechanical retraining and strengthening with physical therapy.  He is amenable to this.  Order has been placed.  Because of the pain, I also recommend a corticosteroid injection over the coming weeks for hopeful successful resolution of his pain patterns.  Thereafter, I  would like to see him 6 weeks after starting physical therapy to assess his progress.  All questions have been answered.       PMH, Medications and Allergies were reviewed and updated as needed.    ROS:  As noted above otherwise negative.    Patient Active Problem List   Diagnosis     CARDIOVASCULAR SCREENING; LDL GOAL LESS THAN 160     Seborrheic dermatitis     Mild persistent asthma without complication     Chronic low back pain, unspecified back pain laterality, unspecified whether sciatica present     Chronic pain in testicle     Decreased libido     Trigger finger, right middle finger     Chronic elbow pain, left       Current Outpatient Medications   Medication Sig Dispense Refill     albuterol (PROAIR HFA/PROVENTIL HFA/VENTOLIN HFA) 108 (90 Base) MCG/ACT inhaler Inhale 2 puffs into the lungs every 4 hours as needed for shortness of breath / dyspnea 3 Inhaler 3     azithromycin (ZITHROMAX) 500 MG tablet Take one tablet daily for up to 3 days as needed for traveler's diarrhea 3 tablet 0     finasteride (PROPECIA) 1 MG tablet Take 1 tablet (1 mg) by mouth daily 90 tablet 3     Fluticasone-Salmeterol (ADVAIR HFA IN)        fluticasone-salmeterol (ADVAIR) 250-50 MCG/ACT inhaler Inhale 1 puff into the lungs every 12 hours 3 each 3     gabapentin (NEURONTIN) 300 MG capsule TAKE ONE CAPSULE BY MOUTH THREE TIMES DAILY AS NEEDED 270 capsule 3     ibuprofen (ADVIL/MOTRIN) 200 MG tablet Take 800 mg by mouth every 4 hours as needed for mild pain.       minoxidil (LONITEN) 2.5 MG tablet Take 1 tablet (2.5 mg) by mouth daily 90 tablet 3     sildenafil (VIAGRA) 50 MG tablet TAKE ONE TO TWO TABLETS BY MOUTH DAILY AS NEEDED  FOR ED 60 tablet 0     tiZANidine (ZANAFLEX) 2 MG tablet Take 1-2 tablets (2-4 mg) by mouth 3 times daily as needed for muscle spasms. 30 tablet 3            OBJECTIVE:       Vitals: There were no vitals filed for this visit.  BMI: There is no height or weight on file to calculate BMI.    Gen:  Well  nourished and in no acute distress  HEENT: Extraocular movement intact  Neck: Supple  Pulm:  Breathing Comfortably. No increased respiratory effort.  Psych: Euthymic. Appropriately answers questions    MSK: Left knee without an effusion.  No overlying erythema or warmth.  No blotting.  Full range of motion.  Very minimal joint line tenderness, no soft tissue tenderness about the quad or patellar tendon.  No tenderness at the MCL or LCL.  No crepitus with range of motion.  Strength testing is appropriate.  Negative Lachman and posterior drawer, varus or valgus stress testing, and Apley grind.  Negative patellar apprehension or compression.      Study Result    Narrative & Impression   MR left knee without contrast 7/8/2024 8:41 AM     Techniques: Multiplanar multisequence imaging of the left knee was  obtained without administration of intra-articular or intravenous  contrast using routing protocol.     History: Meniscal disruption; Internal derangement of left knee     Additional History from EMR: Injury planting leg well kicking a soccer  ball in mid May. Positive medial Luis and Apley grind on exam.     Comparison: 6/27/2016 knee MRI     Findings:     MENISCI:  Medial meniscus: Intact.  Lateral meniscus: Intact.     LIGAMENTS  Cruciate ligaments: Mildly increased intrasubstance signal in the  distal ACL.  Medial supporting structures: Intact.  Lateral supporting structures: Intact.     EXTENSOR MECHANISM  Intact. Edema deep and superficial to the distal patellar tendon.  Superolateral Hoffa's fat pad edema.     FLUID  Trace joint effusion.      OSSEOUS and ARTICULAR STRUCTURES  Bones: Unfused apophysis with bony fragmentation at the tibial  tubercle. Edema within the apophysis.     Patellofemoral compartment: Full-thickness cartilage ulcerations with  associated bone marrow edema at the lateral patellar facet and the  lateral aspect of the trochlea. Focal fissuring at the median ridge  facet with focal bone  marrow hyperintensity.     Medial compartment: Multifocal superficial fissuring.     Lateral compartment: Multifocal superficial fissuring.     ANCILLARY FINDINGS  Varicosities along the anterior medial subcutaneous tissues. Reactive  edema in the fatty tissues in the intracondylar notch.                                                                      Impression:  1. Grade 4 chondromalacia of the patellofemoral joint, progressed  compared to 6/27/2016.  2. Superior-lateral Hoffa's fat pad edema and trochlear cartilage  abnormalities with edema in the trochlea, consistent with patellar  maltracking.  3. Edema deep and superficial to the distal patellar tendon with  associated edema and the unfused apophysis consistent with  Osgood-Schlatter disease.  4. Mild increased intrasubstance signal within the ACL, likely  sequelae of prior low-grade sprain.     I have personally reviewed the examination and initial interpretation  and I agree with the finding             ASSESSMENT and PLAN:     Romario was seen today for pain.    Diagnoses and all orders for this visit:    Osteoarthritis of left patellofemoral joint    Chronic elbow pain, left    Other orders  -     Large Joint Injection: L knee joint      Romario is a 49-year-old male returning to clinic today for the left elbow, as well as left knee.  Overall the patient's elbow is doing very well, and he is not having any remaining pain.  However his left knee is having more pain after he tripped, did not fall on the knee, but did elongate his leg, which is led to more pain in the knee.  His physical exam is rather reassuring and he does not have any ligamentous instability or mechanical symptoms.  His pain pattern today for the knee does appear to be secondary to the osteoarthrosis, he is interested in an injection.  See below procedure note for that.  In regards to the left elbow, I would like him to continue on the conservative management that we have set forth.  He can  follow-up in 3 months or more for the left knee, if knee injection is needed.  Earlier as needed as well.  All questions answered return precautions advised.    Options for treatment and/or follow-up care were reviewed with the patient was actively involved in the decision making process. Patient verbalized understanding and was in agreement with the plan.    Rafael Og DO  , Sports Medicine  Department of [facility] and [facility]    PROCEDURE    Knee Injection - Intraarticular  The patient was informed of the risks and the benefits of the procedure and a written consent was signed.  The patient s left knee was prepped with chlorhexidine in sterile fashion.   40 mg of triamcinolone suspension was drawn up into a 5 mL syringe with 4 mL of 1% lidocaine.  Topical ethyl chloride was used as an anesthetic.  Injection was performed using substerile technique.  A 1.5-inch 22-gauge needle was used to enter the anterolateral aspect of the left knee by landmark guidance.  Injection performed successfully without difficulty.  There were no complications. The patient tolerated the procedure well. There was negligible bleeding. Band-Aid applied after.   The patient was instructed to ice the knee upon leaving clinic and refrain from overuse over the next 3-5 days.   The patient was instructed to call or go to the emergency room with any unusual pain, swelling, redness, or if otherwise concerned.        Large Joint Injection: L knee joint    Date/Time: 2/6/2025 9:14 AM    Performed by: Rafael Og DO  Authorized by: Rafael Og DO    Indications:  Pain  Needle Size comment:  23 G  Guidance: landmark guided    Approach:  Superolateral  Location:  Knee      Medications:  40 mg triamcinolone 40 MG/ML; 4 mL lidocaine (PF) 1 %  Outcome:  Tolerated well, no immediate complications  Procedure discussed: discussed risks, benefits, and alternatives    Consent Given by:   Patient  Timeout: timeout called immediately prior to procedure    Prep: patient was prepped and draped in usual sterile fashion          Again, thank you for allowing me to participate in the care of your patient.      Sincerely,    Rafael Og, DO

## 2025-02-18 NOTE — PROGRESS NOTES
[facility] - Procedure Note    Date of Service: 2/19/2025    Procedure performed: Bilateral L3,4,5 medial branch block #1  Diagnosis: Other spondylosis, lumbar region M47.896; Lumbar facet arthropathy  : Trudy Palacios MD     Indications: Romario Lizarraga is a 49 year old male who is seen at the request of myself (MEDSPINE)  for lumbar medial branch blocks. The patient describes central low back pain without radiation to the legs.  Physical exam shows pain with extension & rotation. The patient reports minimal improvement with conservative treatment, including PT and medications.    MRI LUMBAR SPINE was done on 2/7/2022 which showed   IMPRESSION:  1.  At L5-S1, there is a shallow disc bulge slightly more prominent in  a left paracentral location where it subtly deforms the traversing  nerve root sleeve. Correlate for any left S1 symptoms. There is mild  left and low-grade right foraminal narrowing at this level.  2.  Mild to moderate disc and facet degeneration at L4-L5 with mild  lateral recess and foraminal narrowing.  3.  Less pronounced degenerative changes elsewhere.           Allergies:    No Known Allergies     Vitals:  /83   Pulse 76   SpO2 100%     Review of Systems: The patient denies recent fever, chills, illness, use of antibiotics or anticoagulants. All other 10-point review of systems negative.     Procedure:   Options/alternatives, benefits and risks were discussed with the patient including bleeding, infection, tissue trauma, exposure to radiation, reaction to medications, spinal cord injury, weakness, numbness and paralysis.  Questions were answered to his satisfaction and he agrees to proceed. Voluntary informed consent was obtained and signed.     After getting informed consent, patient was brought into the procedure suite and was placed in a prone position on the procedure table.   A Pause for the Cause was performed.  Patient was prepped and draped  in sterile fashion.     Under AP fluoroscopic guidance the L3, L4, L5 vertebral bodies were identified. The C-arm was rotated to the oblique view to afford optimal visualization the pedicles.  Under intermittent fluoroscopy, 25 gauge 3.5 inch Quinke spinal needles were positioned inferior and lateral to the intersection of the transverse process and pedicle at the Bilateral L4 & L5 levels, as well as the corresponding sacral alar notch. The needle positions were verified and optimized from the AP and lateral views.    The anatomic targets for the L3 & L4 medial nerve and L5 dorsal ramus (which functionally incorporates the medial branch) were the  L4 & L5 transverse processes and sacral alar notch, with laterality as described above, resulting in blockade of the L4/5 and L5/S1 facet joints.    After negative aspiration, Bupivacaine 0.5% 0.5 ml was injected at each location. The needles were removed. Hemostasis was achieved, the area was cleaned, and bandaids were placed when appropriate. The patient tolerated the procedure well, and was taken to the recovery room. Images were saved to PACS.    Assessment/Plan: Romario Lizarraga is a 49 year old male s/p Bilateral L3,4,5 medial branch block today for lumbar spondylosis, facet arthropathy.     Pre procecedure pain score: 7/10   Post procedure pain score: 8/10.     The patient will continue to monitor progress, and they were given a pain diary to complete at home.  They will either fax or mail this back to us or bring it to their next appointment. We will determine the treatment plan after we review the diary.      1. The patient was advised to contact the Pain Management Center for any of the following:   Fever, chills, or night sweats   New onset of pain, numbness, or weakness   Any questions/concerns regarding the procedure  If unable to contact the Pain Center, the patient was instructed to go to a local Emergency Room for any complications.   2. The patient will  receive a follow-up call in 1 week.  3. We will await the pain diary to determent next step of the treatment plan and call patient to schedule.      NELLY VILLA MD   Pain Management

## 2025-02-19 ENCOUNTER — RADIOLOGY INJECTION OFFICE VISIT (OUTPATIENT)
Dept: PALLIATIVE MEDICINE | Facility: CLINIC | Age: 50
End: 2025-02-19
Attending: ANESTHESIOLOGY
Payer: COMMERCIAL

## 2025-02-19 VITALS — SYSTOLIC BLOOD PRESSURE: 128 MMHG | DIASTOLIC BLOOD PRESSURE: 95 MMHG | OXYGEN SATURATION: 96 % | HEART RATE: 76 BPM

## 2025-02-19 DIAGNOSIS — M47.896 OTHER SPONDYLOSIS, LUMBAR REGION: ICD-10-CM

## 2025-02-19 DIAGNOSIS — M47.816 SPONDYLOSIS OF LUMBAR REGION WITHOUT MYELOPATHY OR RADICULOPATHY: Primary | ICD-10-CM

## 2025-02-19 PROCEDURE — 64494 INJ PARAVERT F JNT L/S 2 LEV: CPT | Mod: 50 | Performed by: ANESTHESIOLOGY

## 2025-02-19 PROCEDURE — 64493 INJ PARAVERT F JNT L/S 1 LEV: CPT | Mod: 50 | Performed by: ANESTHESIOLOGY

## 2025-02-19 ASSESSMENT — PAIN SCALES - GENERAL
PAINLEVEL_OUTOF10: SEVERE PAIN (8)
PAINLEVEL_OUTOF10: SEVERE PAIN (7)

## 2025-02-19 NOTE — PATIENT INSTRUCTIONS
[facility]   Medial Branch Block Discharge Instructions      Your procedure was performed by:  Dr. Trudy Palacios     Medications used: lidocaine, bupivacaine      You will need to complete the Pain Scale Log form and return it to us as soon as possible.  Once we have received the form, we will review it and call you to determine the next steps.     The form can be faxed to [phone] or mailed to:   [facility] [place] [facility]    [address], [address] [id number]    You may resume your regular activity after the injection.  If you were holding your blood thinning medication, please restart taking it: N/A  Be cautious with walking since you may have numbness and/or weakness in the lower extremities for up to 6-8 hours after the procedure due to the effects of the local anesthetic.  Avoid driving for 6 hours. The local anesthetic could slow your reflexes  You may shower, however no swimming or tub baths or hot tubs for 24 hours following your procedure.  Your pain will return after the numbing medications have worn off.  You may use your current pain medications as needed.  Unless you have been directed to avoid the use of anti-inflammatory medications (NSAIDS), you may use medications such as ibuprofen, Aleve or Tylenol for pain control if needed. Some people find it helpful to alternate ibuprofen and Tylenol every 3 hours for a couple of days.  You may use ice packs 10-15 minutes three to four times a day at the injection site for comfort.   Do not use heat to painful areas for 6 to 8 hours. This will give the local anesthetic time to wear off and prevent you from accidentally burning your skin.   If you experience any of the following, call the Pain Clinic during work hours at [phone] or the Provider Line after hours at [phone]:  -Fever over 100 degree F  -Swelling, bleeding, redness, drainage, warmth at the  injection site  -Progressive weakness or numbness on your legs or arms  -If lumbar, call if you have a loss of bowel or bladder function  -If cervical, call if you have any unusual headache that is not relieved by Tylenol  -Unusual new onset of pain that is not improving     daughter

## 2025-02-19 NOTE — NURSING NOTE
Pre-procedure Intake  If YES to any questions or NO to having a   Please complete laminated checklist and leave on the computer keyboard for Provider, verbally inform provider if able.    For SCS Trial, RFA's or any sedation procedure:  Have you been fasting? NA  If yes, for how long?     Are you taking any any blood thinners such as Coumadin, Warfarin, Jantoven, Pradaxa Xarelto, Eliquis, Edoxaban, Enoxaparin, Lovenox, Heparin, Arixtra, Fondaparinux, or Fragmin? OR Antiplatelet medication such as Plavix, Brilinta, or Effient?   No   If yes, when did you take your last dose?     Do you take aspirin?  No  If cervical procedure, have you held aspirin for 6 days?   NA    Is the Pt taking any GLP-1 Antagonist (hold needed for sedation patients only)  (semaglutide (Ozempic, Wegovy), dulaglutide (Trulicity), exenatide ER (Bydureon), tirzepatide (Mounjaro), Liraglutide (Saxenda, Victoza), semaglutide (Rybelsus)     NA  If yes, when did you take your last dose?     Do you have any allergies to contrast dye, iodine, steroid and/or numbing medications?  NO    Are you currently taking antibiotics or have an active infection?  YES: 02/19/25    Have you had a fever/elevated temperature within the past week? NO    Are you currently taking oral steroids? NO    Do you have a ? Yes    Are you pregnant or breastfeeding?  Not Applicable    Have you received any vaccinations in the last week? NO    Notify provider and RNs if systolic BP >170, diastolic BP >100, P >100 or O2 sats < 90%      Jannet Huff MA  [facility] [place]

## 2025-02-19 NOTE — NURSING NOTE
Discharge Information    IV Discontiued Time:  NA    Amount of Fluid Infused:  NA    Discharge Criteria = When patient returns to baseline or as per MD order    Consciousness:  Pt is fully awake    Circulation:  BP +/- 20% of pre-procedure level    Respiration:  Patient is able to breathe deeply    O2 Sat:  Patient is able to maintain O2 Sat >92% on room air    Activity:  Moves 4 extremities on command    Ambulation:  Patient is able to stand and walk or stand and pivot into wheelchair    Dressing:  Clean/dry or No Dressing    Notes:   Discharge instructions and AVS given to patient    Patient meets criteria for discharge?  YES    Admitted to PCU?  No    Responsible adult present to accompany patient home?  Yes    Signature/Title:    Rubia Griffin RN  RN Care Coordinator  [facility] [place]

## 2025-03-10 ENCOUNTER — MYC MEDICAL ADVICE (OUTPATIENT)
Dept: PALLIATIVE MEDICINE | Facility: CLINIC | Age: 50
End: 2025-03-10
Payer: COMMERCIAL

## 2025-03-10 NOTE — TELEPHONE ENCOUNTER
----------------Mychart Below from pt----------------  Good Morning Dr. Palacios,  I m wondering if there is an update from the insurance liaison for approval of the second test injection.  By simply packing the car this past weekend I m almost immobile due to the pain this week. I m just hoping to know when we may be able to move forward.      Thanks in advance.   Romario        --------------Mychart below response to pt----------------    Ashley Doss,    It looks like an injection  tried calling you at the end of February to schedule the second test injection. You may call [phone] to schedule the second injection as we did receive approval to continue with the second injection.    Rubia Bowser, RN Patient Care Coordinator    [facility]  Pain Management    When responding to this message, please allow 24-48 business hours for a reply.  If you need sooner assistance please call: [facility] at [phone] between the hours of 8:00AM and 4:30PM Monday through Friday.    Note that [facility] is not intended for emergencies, detailed provider communication, or in lieu of an office visit. Medication is not typically adjusted over MyChart communication.     rvp detected for influenza ah1 2009

## 2025-03-21 NOTE — PROGRESS NOTES
[facility] - Procedure Note    Date of Service: 3/31/2025    Procedure performed: Bilateral L3,4,5 medial branch block #2 (first was done on 2/19/2025)  Diagnosis: Other spondylosis, lumbar region M47.896; Lumbar facet arthropathy  : Trudy Palacios MD     Indications: Romario Lizarraga is a 49 year old male who is seen at the request of myself (MEDSPINE)  for lumbar medial branch blocks. The patient describes central low back pain without radiation to the legs.  Physical exam shows pain with extension & rotation. The patient reports minimal improvement with conservative treatment, including PT and medications.      MRI LUMBAR SPINE was done on 2/7/2022 which showed   IMPRESSION:  1.  At L5-S1, there is a shallow disc bulge slightly more prominent in  a left paracentral location where it subtly deforms the traversing  nerve root sleeve. Correlate for any left S1 symptoms. There is mild  left and low-grade right foraminal narrowing at this level.  2.  Mild to moderate disc and facet degeneration at L4-L5 with mild  lateral recess and foraminal narrowing.  3.  Less pronounced degenerative changes elsewhere.           Allergies:    No Known Allergies     Vitals:  BP (!) 146/92   Pulse 76   SpO2 98%     Review of Systems: The patient denies recent fever, chills, illness, use of antibiotics or anticoagulants. All other 10-point review of systems negative.     Procedure:   Options/alternatives, benefits and risks were discussed with the patient including bleeding, infection, tissue trauma, exposure to radiation, reaction to medications, spinal cord injury, weakness, numbness and paralysis.  Questions were answered to his satisfaction and he agrees to proceed. Voluntary informed consent was obtained and signed.     After getting informed consent, patient was brought into the procedure suite and was placed in a prone position on the procedure table.   A Pause for the Cause was  performed.  Patient was prepped and draped in sterile fashion.     Under AP fluoroscopic guidance the L3, L4, L5 vertebral bodies were identified. The C-arm was rotated to the oblique view to afford optimal visualization the pedicles.  Under intermittent fluoroscopy, 25 gauge 3.5 inch Quinke spinal needles were positioned inferior and lateral to the intersection of the transverse process and pedicle at the Bilateral L4 & L5 levels, as well as the corresponding sacral alar notch. The needle positions were verified and optimized from the AP and lateral views.    The anatomic targets for the L3 & L4 medial nerve and L5 dorsal ramus (which functionally incorporates the medial branch) were the  L4 & L5 transverse processes and sacral alar notch, with laterality as described above, resulting in blockade of the L4/5 and L5/S1 facet joints.    After negative aspiration, Lidocaine 1% 0.5 ml was injected at each location. The needles were removed. Hemostasis was achieved, the area was cleaned, and bandaids were placed when appropriate. The patient tolerated the procedure well, and was taken to the recovery room. Images were saved to PACS.    Assessment/Plan: Romario Lizarraga is a 49 year old male s/p Bilateral L3,4,5 medial branch block today for lumbar spondylosis, facet arthropathy.     Pre procecedure pain score: 8/10   Post procedure pain score: 8/10.     The patient will continue to monitor progress, and they were given a pain diary to complete at home.  They will either fax or mail this back to us or bring it to their next appointment. We will determine the treatment plan after we review the diary.      1. The patient was advised to contact the Pain Management Center for any of the following:   Fever, chills, or night sweats   New onset of pain, numbness, or weakness   Any questions/concerns regarding the procedure  If unable to contact the Pain Center, the patient was instructed to go to a local Emergency Room for  any complications.   2. The patient will receive a follow-up call in 1 week.  3. We will await the pain diary to determent next step of the treatment plan and call patient to schedule.      NELLY VILLA MD   Pain Management

## 2025-03-31 ENCOUNTER — RADIOLOGY INJECTION OFFICE VISIT (OUTPATIENT)
Dept: PALLIATIVE MEDICINE | Facility: CLINIC | Age: 50
End: 2025-03-31
Payer: COMMERCIAL

## 2025-03-31 VITALS — HEART RATE: 76 BPM | DIASTOLIC BLOOD PRESSURE: 92 MMHG | OXYGEN SATURATION: 98 % | SYSTOLIC BLOOD PRESSURE: 146 MMHG

## 2025-03-31 DIAGNOSIS — M47.816 SPONDYLOSIS OF LUMBAR REGION WITHOUT MYELOPATHY OR RADICULOPATHY: Primary | ICD-10-CM

## 2025-03-31 PROCEDURE — 64493 INJ PARAVERT F JNT L/S 1 LEV: CPT | Mod: 50 | Performed by: ANESTHESIOLOGY

## 2025-03-31 PROCEDURE — 64494 INJ PARAVERT F JNT L/S 2 LEV: CPT | Mod: 50 | Performed by: ANESTHESIOLOGY

## 2025-03-31 ASSESSMENT — PAIN SCALES - GENERAL: PAINLEVEL_OUTOF10: SEVERE PAIN (8)

## 2025-03-31 NOTE — NURSING NOTE
Pre-procedure Intake  If YES to any questions or NO to having a   Please complete laminated checklist and leave on the computer keyboard for Provider, verbally inform provider if able.    For SCS Trial, RFA's or any sedation procedure:  Have you been fasting? NA  If yes, for how long?     Are you taking any any blood thinners such as Coumadin, Warfarin, Jantoven, Pradaxa Xarelto, Eliquis, Edoxaban, Enoxaparin, Lovenox, Heparin, Arixtra, Fondaparinux, or Fragmin? OR Antiplatelet medication such as Plavix, Brilinta, or Effient?   No   If yes, when did you take your last dose?     Do you take aspirin?  No  If cervical procedure, have you held aspirin for 6 days?   NA    Is the Pt taking any GLP-1 Antagonist (hold needed for sedation patients only)  (semaglutide (Ozempic, Wegovy), dulaglutide (Trulicity), exenatide ER (Bydureon), tirzepatide (Mounjaro), Liraglutide (Saxenda, Victoza), semaglutide (Rybelsus)     NA  If yes, when did you take your last dose?     Do you have any allergies to contrast dye, iodine, steroid and/or numbing medications?  NO    Are you currently taking antibiotics or have an active infection?  NO    Have you had a fever/elevated temperature within the past week? NO    Are you currently taking oral steroids? NO    Do you have a ? No     Are you pregnant or breastfeeding?  Not Applicable    Have you received any vaccinations in the last week? NO    Vitals: B/P 146/92    Notify provider and RNs if systolic BP >170, diastolic BP >100, P >100 or O2 sats < 90%      Jannet Huff MA  [facility] [place]

## 2025-03-31 NOTE — PATIENT INSTRUCTIONS
[facility]   Medial Branch Block Discharge Instructions      Your procedure was performed by:  Dr. Trudy Palacios     Medications used: lidocaine    You will need to complete the Pain Scale Log form and return it to us as soon as possible.  Once we have received the form, we will review it and call you to determine the next steps.     The form can be faxed to [phone] or mailed to:   [facility]    [address] [id number]    You may resume your regular activity after the injection.  Be cautious with walking since you may have numbness and/or weakness in the lower extremities for up to 6-8 hours after the procedure due to the effects of the local anesthetic.  Avoid driving for 6 hours. The local anesthetic could slow your reflexes  You may shower, however no swimming or tub baths or hot tubs for 24 hours following your procedure.  Your pain will return after the numbing medications have worn off.  You may use your current pain medications as needed.  Unless you have been directed to avoid the use of anti-inflammatory medications (NSAIDS), you may use medications such as ibuprofen, Aleve or Tylenol for pain control if needed. Some people find it helpful to alternate ibuprofen and Tylenol every 3 hours for a couple of days.  You may use ice packs 10-15 minutes three to four times a day at the injection site for comfort.   Do not use heat to painful areas for 6 to 8 hours. This will give the local anesthetic time to wear off and prevent you from accidentally burning your skin.   If you experience any of the following, call the Pain Clinic during work hours at [phone] or the Provider Line after hours at [phone]:  -Fever over 100 degree F  -Swelling, bleeding, redness, drainage, warmth at the injection site  -Progressive weakness or numbness on your legs   -If lumbar, call if you have a loss of  bowel or bladder function  -Unusual new onset of pain that is not improving

## 2025-03-31 NOTE — NURSING NOTE
Discharge Information    IV Discontiued Time:  NA    Amount of Fluid Infused:  NA    Discharge Criteria = When patient returns to baseline or as per MD order    Consciousness:  Pt is fully awake    Circulation:  BP +/- 20% of pre-procedure level    Respiration:  Patient is able to breathe deeply    O2 Sat:  Patient is able to maintain O2 Sat >92% on room air    Activity:  Moves 4 extremities on command    Ambulation:  Patient is able to stand and walk or stand and pivot into wheelchair    Dressing:  Clean/dry or No Dressing    Notes:   Discharge instructions and AVS given to patient    Patient meets criteria for discharge?  YES    Admitted to PCU?  No    Responsible adult present to accompany patient home?  Yes    Signature/Title:    Miroslava Conner RN  RN Care Coordinator  [facility] [place]

## 2025-04-03 ENCOUNTER — MYC MEDICAL ADVICE (OUTPATIENT)
Dept: PALLIATIVE MEDICINE | Facility: CLINIC | Age: 50
End: 2025-04-03
Payer: COMMERCIAL

## 2025-04-03 NOTE — TELEPHONE ENCOUNTER
Closing as this has already been addressed    Miroslava AVINA, RN Care Coordinator  [facility]  Pain [facility]

## 2025-04-15 PROBLEM — G89.29 CHRONIC ELBOW PAIN, LEFT: Status: RESOLVED | Noted: 2024-11-25 | Resolved: 2025-04-15

## 2025-04-15 PROBLEM — M25.522 CHRONIC ELBOW PAIN, LEFT: Status: RESOLVED | Noted: 2024-11-25 | Resolved: 2025-04-15

## 2025-04-16 SDOH — HEALTH STABILITY: PHYSICAL HEALTH: ON AVERAGE, HOW MANY MINUTES DO YOU ENGAGE IN EXERCISE AT THIS LEVEL?: 0 MIN

## 2025-04-16 SDOH — HEALTH STABILITY: PHYSICAL HEALTH: ON AVERAGE, HOW MANY DAYS PER WEEK DO YOU ENGAGE IN MODERATE TO STRENUOUS EXERCISE (LIKE A BRISK WALK)?: 0 DAYS

## 2025-04-16 ASSESSMENT — ASTHMA QUESTIONNAIRES
QUESTION_4 LAST FOUR WEEKS HOW OFTEN HAVE YOU USED YOUR RESCUE INHALER OR NEBULIZER MEDICATION (SUCH AS ALBUTEROL): NOT AT ALL
QUESTION_1 LAST FOUR WEEKS HOW MUCH OF THE TIME DID YOUR ASTHMA KEEP YOU FROM GETTING AS MUCH DONE AT WORK, SCHOOL OR AT HOME: NONE OF THE TIME
QUESTION_2 LAST FOUR WEEKS HOW OFTEN HAVE YOU HAD SHORTNESS OF BREATH: NOT AT ALL
ACT_TOTALSCORE: 25
QUESTION_5 LAST FOUR WEEKS HOW WOULD YOU RATE YOUR ASTHMA CONTROL: COMPLETELY CONTROLLED
QUESTION_3 LAST FOUR WEEKS HOW OFTEN DID YOUR ASTHMA SYMPTOMS (WHEEZING, COUGHING, SHORTNESS OF BREATH, CHEST TIGHTNESS OR PAIN) WAKE YOU UP AT NIGHT OR EARLIER THAN USUAL IN THE MORNING: NOT AT ALL

## 2025-04-16 ASSESSMENT — SOCIAL DETERMINANTS OF HEALTH (SDOH): HOW OFTEN DO YOU GET TOGETHER WITH FRIENDS OR RELATIVES?: ONCE A WEEK

## 2025-04-21 ENCOUNTER — OFFICE VISIT (OUTPATIENT)
Dept: PEDIATRICS | Facility: CLINIC | Age: 50
End: 2025-04-21
Payer: COMMERCIAL

## 2025-04-21 VITALS
RESPIRATION RATE: 16 BRPM | SYSTOLIC BLOOD PRESSURE: 123 MMHG | WEIGHT: 217.1 LBS | HEIGHT: 72 IN | HEART RATE: 75 BPM | OXYGEN SATURATION: 100 % | BODY MASS INDEX: 29.4 KG/M2 | DIASTOLIC BLOOD PRESSURE: 83 MMHG | TEMPERATURE: 96.8 F

## 2025-04-21 DIAGNOSIS — M54.42 CHRONIC MIDLINE LOW BACK PAIN WITH LEFT-SIDED SCIATICA: ICD-10-CM

## 2025-04-21 DIAGNOSIS — Z00.00 ROUTINE GENERAL MEDICAL EXAMINATION AT A HEALTH CARE FACILITY: Primary | ICD-10-CM

## 2025-04-21 DIAGNOSIS — J45.30 MILD PERSISTENT ASTHMA WITHOUT COMPLICATION: ICD-10-CM

## 2025-04-21 DIAGNOSIS — G89.29 CHRONIC MIDLINE LOW BACK PAIN WITH LEFT-SIDED SCIATICA: ICD-10-CM

## 2025-04-21 DIAGNOSIS — Z12.5 SCREENING FOR PROSTATE CANCER: ICD-10-CM

## 2025-04-21 DIAGNOSIS — Z13.6 CARDIOVASCULAR SCREENING; LDL GOAL LESS THAN 160: ICD-10-CM

## 2025-04-21 DIAGNOSIS — L65.9 HAIR LOSS: ICD-10-CM

## 2025-04-21 PROCEDURE — 80053 COMPREHEN METABOLIC PANEL: CPT | Performed by: INTERNAL MEDICINE

## 2025-04-21 PROCEDURE — 90471 IMMUNIZATION ADMIN: CPT | Performed by: INTERNAL MEDICINE

## 2025-04-21 PROCEDURE — G0103 PSA SCREENING: HCPCS | Performed by: INTERNAL MEDICINE

## 2025-04-21 PROCEDURE — 3079F DIAST BP 80-89 MM HG: CPT | Performed by: INTERNAL MEDICINE

## 2025-04-21 PROCEDURE — 3074F SYST BP LT 130 MM HG: CPT | Performed by: INTERNAL MEDICINE

## 2025-04-21 PROCEDURE — 99396 PREV VISIT EST AGE 40-64: CPT | Mod: 25 | Performed by: INTERNAL MEDICINE

## 2025-04-21 PROCEDURE — 1125F AMNT PAIN NOTED PAIN PRSNT: CPT | Performed by: INTERNAL MEDICINE

## 2025-04-21 PROCEDURE — 99213 OFFICE O/P EST LOW 20 MIN: CPT | Mod: 25 | Performed by: INTERNAL MEDICINE

## 2025-04-21 PROCEDURE — 80061 LIPID PANEL: CPT | Performed by: INTERNAL MEDICINE

## 2025-04-21 PROCEDURE — 36415 COLL VENOUS BLD VENIPUNCTURE: CPT | Performed by: INTERNAL MEDICINE

## 2025-04-21 PROCEDURE — 90677 PCV20 VACCINE IM: CPT | Performed by: INTERNAL MEDICINE

## 2025-04-21 RX ORDER — TIZANIDINE 2 MG/1
2-4 TABLET ORAL 3 TIMES DAILY PRN
Qty: 30 TABLET | Refills: 3 | Status: SHIPPED | OUTPATIENT
Start: 2025-04-21

## 2025-04-21 RX ORDER — FLUTICASONE PROPIONATE AND SALMETEROL 250; 50 UG/1; UG/1
1 POWDER RESPIRATORY (INHALATION) EVERY 12 HOURS
Qty: 3 EACH | Refills: 3 | Status: SHIPPED | OUTPATIENT
Start: 2025-04-21

## 2025-04-21 RX ORDER — MINOXIDIL 2.5 MG/1
2.5 TABLET ORAL DAILY
Qty: 90 TABLET | Refills: 3 | Status: SHIPPED | OUTPATIENT
Start: 2025-04-21

## 2025-04-21 RX ORDER — FINASTERIDE 1 MG/1
1 TABLET, FILM COATED ORAL DAILY
Qty: 90 TABLET | Refills: 3 | Status: SHIPPED | OUTPATIENT
Start: 2025-04-21

## 2025-04-21 RX ORDER — GABAPENTIN 300 MG/1
CAPSULE ORAL
Qty: 270 CAPSULE | Refills: 3 | Status: CANCELLED | OUTPATIENT
Start: 2025-04-21

## 2025-04-21 ASSESSMENT — PAIN SCALES - GENERAL: PAINLEVEL_OUTOF10: MODERATE PAIN (4)

## 2025-04-21 NOTE — PATIENT INSTRUCTIONS
Patient Education   Preventive Care Advice   This is general advice given by our system to help you stay healthy. However, your care team may have specific advice just for you. Please talk to your care team about your preventive care needs.  Nutrition  Eat 5 or more servings of fruits and vegetables each day.  Try wheat bread, brown rice and whole grain pasta (instead of white bread, rice, and pasta).  Get enough calcium and vitamin D. Check the label on foods and aim for 100% of the RDA (recommended daily allowance).  Lifestyle  Exercise at least 150 minutes each week  (30 minutes a day, 5 days a week).  Do muscle strengthening activities 2 days a week. These help control your weight and prevent disease.  No smoking.  Wear sunscreen to prevent skin cancer.  Have a dental exam and cleaning every 6 months.  Yearly exams  See your health care team every year to talk about:  Any changes in your health.  Any medicines your care team has prescribed.  Preventive care, family planning, and ways to prevent chronic diseases.  Shots (vaccines)   HPV shots (up to age 26), if you've never had them before.  Hepatitis B shots (up to age 59), if you've never had them before.  COVID-19 shot: Get this shot when it's due.  Flu shot: Get a flu shot every year.  Tetanus shot: Get a tetanus shot every 10 years.  Pneumococcal, hepatitis A, and RSV shots: Ask your care team if you need these based on your risk.  Shingles shot (for age 50 and up)  General health tests  Diabetes screening:  Starting at age 35, Get screened for diabetes at least every 3 years.  If you are younger than age 35, ask your care team if you should be screened for diabetes.  Cholesterol test: At age 39, start having a cholesterol test every 5 years, or more often if advised.  Bone density scan (DEXA): At age 50, ask your care team if you should have this scan for osteoporosis (brittle bones).  Hepatitis C: Get tested at least once in your life.  STIs (sexually  transmitted infections)  Before age 24: Ask your care team if you should be screened for STIs.  After age 24: Get screened for STIs if you're at risk. You are at risk for STIs (including HIV) if:  You are sexually active with more than one person.  You don't use condoms every time.  You or a partner was diagnosed with a sexually transmitted infection.  If you are at risk for HIV, ask about PrEP medicine to prevent HIV.  Get tested for HIV at least once in your life, whether you are at risk for HIV or not.  Cancer screening tests  Cervical cancer screening: If you have a cervix, begin getting regular cervical cancer screening tests starting at age 21.  Breast cancer scan (mammogram): If you've ever had breasts, begin having regular mammograms starting at age 40. This is a scan to check for breast cancer.  Colon cancer screening: It is important to start screening for colon cancer at age 45.  Have a colonoscopy test every 10 years (or more often if you're at risk) Or, ask your provider about stool tests like a FIT test every year or Cologuard test every 3 years.  To learn more about your testing options, visit:   .  For help making a decision, visit:   https://bit.ly/hp79271.  Prostate cancer screening test: If you have a prostate, ask your care team if a prostate cancer screening test (PSA) at age 55 is right for you.  Lung cancer screening: If you are a current or former smoker ages 50 to 80, ask your care team if ongoing lung cancer screenings are right for you.  For informational purposes only. Not to replace the advice of your health care provider. Copyright   2023 [facility]. All rights reserved. Clinically reviewed by the [facility] Transitions Program. Blippar [id number] - REV 01/24.  Eating Healthy Foods: Care Instructions  With every meal, you can make healthy food choices. Try to eat a variety of fruits, vegetables, whole grains, lean proteins, and low-fat dairy products. This can help  "you get the right balance of nutrients, including vitamins and minerals. Small changes add up over time. You can start by adding one healthy food to your meals each day.    Try to make half your plate fruits and vegetables, one-fourth whole grains, and one-fourth lean proteins. Try including dairy with your meals.   Eat more fruits and vegetables. Try to have them with most meals and snacks.   Foods for healthy eating        Fruits   These can be fresh, frozen, canned, or dried.  Try to choose whole fruit rather than fruit juice.  Eat a variety of colors.        Vegetables   These can be fresh, frozen, canned, or dried.  Beans, peas, and lentils count too.        Whole grains   Choose whole-grain breads, cereals, and noodles.  Try brown rice.        Lean proteins   These can include lean meat, poultry, fish, and eggs.  You can also have tofu, beans, peas, lentils, nuts, and seeds.        Dairy   Try milk, yogurt, and cheese.  Choose low-fat or fat-free when you can.  If you need to, use lactose-free milk or fortified plant-based milk products, such as soy milk.        Water   Drink water when you're thirsty.  Limit sugar-sweetened drinks, including soda, fruit drinks, and sports drinks.  Where can you learn more?  Go to https://www.eShakti.com.net/patiented  Enter T756 in the search box to learn more about \"Eating Healthy Foods: Care Instructions.\"  Current as of: October 7, 2024  Content Version: 14.4    8739-8704 [employer].   Care instructions adapted under license by your healthcare professional. If you have questions about a medical condition or this instruction, always ask your healthcare professional. [employer] disclaims any warranty or liability for your use of this information.       "
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
## 2025-04-22 LAB
ALBUMIN SERPL BCG-MCNC: 4.4 G/DL (ref 3.5–5.2)
ALP SERPL-CCNC: 53 U/L (ref 40–150)
ALT SERPL W P-5'-P-CCNC: 22 U/L (ref 0–70)
ANION GAP SERPL CALCULATED.3IONS-SCNC: 11 MMOL/L (ref 7–15)
AST SERPL W P-5'-P-CCNC: 22 U/L (ref 0–45)
BILIRUB SERPL-MCNC: 0.6 MG/DL
BUN SERPL-MCNC: 16.7 MG/DL (ref 6–20)
CALCIUM SERPL-MCNC: 9.1 MG/DL (ref 8.8–10.4)
CHLORIDE SERPL-SCNC: 108 MMOL/L (ref 98–107)
CHOLEST SERPL-MCNC: 233 MG/DL
CREAT SERPL-MCNC: 0.97 MG/DL (ref 0.67–1.17)
EGFRCR SERPLBLD CKD-EPI 2021: >90 ML/MIN/1.73M2
FASTING STATUS PATIENT QL REPORTED: YES
FASTING STATUS PATIENT QL REPORTED: YES
GLUCOSE SERPL-MCNC: 90 MG/DL (ref 70–99)
HCO3 SERPL-SCNC: 23 MMOL/L (ref 22–29)
HDLC SERPL-MCNC: 53 MG/DL
LDLC SERPL CALC-MCNC: 163 MG/DL
NONHDLC SERPL-MCNC: 180 MG/DL
POTASSIUM SERPL-SCNC: 4.5 MMOL/L (ref 3.4–5.3)
PROT SERPL-MCNC: 7 G/DL (ref 6.4–8.3)
PSA SERPL DL<=0.01 NG/ML-MCNC: 0.2 NG/ML (ref 0–3.5)
SODIUM SERPL-SCNC: 142 MMOL/L (ref 135–145)
TRIGL SERPL-MCNC: 83 MG/DL

## 2025-04-23 ENCOUNTER — TELEPHONE (OUTPATIENT)
Dept: ORTHOPEDICS | Facility: CLINIC | Age: 50
End: 2025-04-23
Payer: COMMERCIAL

## 2025-04-23 DIAGNOSIS — M17.12 ARTHRITIS OF LEFT KNEE: Primary | ICD-10-CM

## 2025-04-23 NOTE — TELEPHONE ENCOUNTER
PA for Synvisc One placed. Will discuss which injection he prefers at the time of the appointment.         Lexa Brady M.A., LAT, ATC  Certified Athletic Trainer

## 2025-04-23 NOTE — TELEPHONE ENCOUNTER
Other: Patient scheduled for 5/19 for left knee injection. He is not sure what kind of injection Dr. Rafael Og recommended last visit but would like to try it. Sending TE for prior authorization.       Could we send this information to you in [employer] or would you prefer to receive a phone call?:   No preference   Okay to leave a detailed message?: Yes at Cell number on file:    Telephone Information:   Mobile [phone]

## 2025-05-12 NOTE — PROGRESS NOTES
[facility] - Procedure Note    Date of Visit: 5/14/2025    Pre procedure Diagnosis: Lumbar spondylosis, lumbar region M47.896, Lumbar facet arthropathy   Post procedure Diagnosis: Same  Procedure performed: Bilateral L3,4,5 radiofrequency ablation   Anesthesia:PO Valium  Complications: NONE  Operators: Trudy Palacios MD     Indications:   Romario Lizarraga is a 49 year old male was sent by me ([employer]) for lumbar RFA.  They have a history of central low back pain without radiation to the legs.  Exam shows increased discomfort with extension and rotation and they have tried conservative treatment including PT and medications.    Romario Lizarraga had medial branch blocks on 3/31/2025 and 2/19/2025 showing appropriate pain relief, therefore radiofrequency ablation will be done.     MRI LUMBAR SPINE was done on 2/7/2022 which showed   IMPRESSION:  1.  At L5-S1, there is a shallow disc bulge slightly more prominent in  a left paracentral location where it subtly deforms the traversing  nerve root sleeve. Correlate for any left S1 symptoms. There is mild  left and low-grade right foraminal narrowing at this level.  2.  Mild to moderate disc and facet degeneration at L4-L5 with mild  lateral recess and foraminal narrowing.  3.  Less pronounced degenerative changes elsewhere.         Allergies:    No Known Allergies     Vitals:  BP (!) 144/100   Pulse 70   SpO2 98%     Review of Systems: The patient denies recent fever, chills, illness, use of antibiotics or anticoagulants. All other 10-point review of systems negative.     Options/alternatives, benefits and risks were discussed with the patient including bleeding, infection, no pain relief, tissue trauma, exposure to radiation, reaction to medications including seizure, spinal cord injury,increased pain after the procedure, weakness, numbness or sensory changes and headache.   We also discussed risks of sedation, including reaction to  medications and cardiovascular collapse.    Questions were answered to his satisfaction and he agrees to proceed. Voluntary informed consent was obtained and signed.     Medications were reviewed:  Yes   Pre-procedure pain score: 9/10    Procedure:  After getting informed consent, patient was brought into the procedure suite and was placed in a prone position on the procedure table.   A Pause for the Cause was performed.  Patient was prepped and draped in sterile fashion.     The C-arm was positioned in the right lateral oblique view to afford optimal view of the L4-L5 vertebral bodies. Lidocaine 1% was used to anesthetize the skin at each level.  At each level, a 100mm, 20G curved radiofrequency cannula with a 10mm active tip was positioned, overlying the intersection of the transverse process and pedicle at L4 & L5, and was advanced under intermittent fluoroscopy until it contacted the transverse process and notch, and the tip slightly overran that process, just lateral to the mamillary process.  The position of each cannula was verified and optimized in the oblique view and AP views.    In the AP view, another cannula was placed at the sacral alar notch.      Each position was tested for motor stimulation.  Motor stimulation was completed at 2Hz, up to 2.5V.  Lidocaine 1% 0.5 ml was injected at each level, and a 90 second, 80 degree Centigrade lesion was generated.    The needles were then rotated within the pathway of the medial branch, and locations were evaluated with repeat imaging.  Motor testing was again completed, and showed appropriate stimulation.  A second lesion was then generated at each location.    The procedure was then repeated on the left side, using the same technique as described above.    The needles were withdrawn. Hemostasis was achieved, the area was cleaned, and bandaids were placed when appropriate.  The patient tolerated the procedure well, and was taken to the recovery room.    Images  were saved to PACS.    Post-procedure pain score: 1/10  Follow-up includes:   -post-procedure pain medications: NONE  -f/u with the referring provider      NELLY VILLA MD   Pain Management

## 2025-05-14 ENCOUNTER — RADIOLOGY INJECTION OFFICE VISIT (OUTPATIENT)
Dept: PALLIATIVE MEDICINE | Facility: CLINIC | Age: 50
End: 2025-05-14
Payer: COMMERCIAL

## 2025-05-14 VITALS — OXYGEN SATURATION: 98 % | DIASTOLIC BLOOD PRESSURE: 100 MMHG | HEART RATE: 70 BPM | SYSTOLIC BLOOD PRESSURE: 144 MMHG

## 2025-05-14 DIAGNOSIS — M47.816 SPONDYLOSIS OF LUMBAR REGION WITHOUT MYELOPATHY OR RADICULOPATHY: Primary | ICD-10-CM

## 2025-05-14 PROCEDURE — 64635 DESTROY LUMB/SAC FACET JNT: CPT | Mod: 50 | Performed by: ANESTHESIOLOGY

## 2025-05-14 SDOH — HEALTH STABILITY: PHYSICAL HEALTH: ON AVERAGE, HOW MANY DAYS PER WEEK DO YOU ENGAGE IN MODERATE TO STRENUOUS EXERCISE (LIKE A BRISK WALK)?: 0 DAYS

## 2025-05-14 SDOH — HEALTH STABILITY: PHYSICAL HEALTH: ON AVERAGE, HOW MANY MINUTES DO YOU ENGAGE IN EXERCISE AT THIS LEVEL?: 0 MIN

## 2025-05-14 ASSESSMENT — PAIN SCALES - GENERAL
PAINLEVEL_OUTOF10: SEVERE PAIN (9)
PAINLEVEL_OUTOF10: MILD PAIN (1)

## 2025-05-14 NOTE — NURSING NOTE
Discharge Information    IV Discontiued Time:  NA    Amount of Fluid Infused:  NA    Discharge Criteria = When patient returns to baseline or as per MD order    Consciousness:  Pt is fully awake    Circulation:  BP +/- 20% of pre-procedure level    Respiration:  Patient is able to breathe deeply    O2 Sat:  Patient is able to maintain O2 Sat >92% on room air    Activity:  Moves 4 extremities on command    Ambulation:  Patient is able to stand and walk or stand and pivot into wheelchair    Dressing:  Clean/dry or No Dressing    Notes:   Discharge instructions and AVS given to patient    Patient meets criteria for discharge?  YES    Admitted to PCU?  No    Responsible adult present to accompany patient home?  Yes    Signature/Title:    Rubia Griffin RN  RN Care Coordinator  [facility]

## 2025-05-14 NOTE — PATIENT INSTRUCTIONS
[facility] Procedure Discharge Instructions     Today you saw:   Dr. Trudy Palacios       Your procedure:  Radiofrequency Nerve Ablation     Procedural Medications:  Lidocaine (anesthetic)         Sedation medications:  Oral Valium    You have received sedation during your procedure; for the next 24 hours you should not:   -Drive   -Operate machinery   -Drink alcohol   -Sign any legal documents   If you were holding your blood thinning medication, please restart taking it: N/A  You may resume your normal diet and medications.   Avoid strenuous activity for the first 24 hours and resume regular activities after that.   Be cautious with walking as numbness and/or weakness in the lower extremities up to 6-8 hours may occur due to effect of local anesthetic   You may shower, however no swimming or tub baths or hot tubs for 24 hours following your procedure   Anticipate pain for up to 2 weeks after this procedure.  You may use ice packs 10-15 minutes three to four times a day at the injection site for comfort   You may use anti-inflammatory medications (such as Ibuprofen or Aleve or Advil) or Tylenol for pain control if necessary         It may take up to 8 weeks to receive relief from the RFA    If you experience any of the following, call the pain center line during work hours at [phone] or on call physician after hours at [phone]:  -Fever over 100 degree F   -Swelling, bleeding, redness, drainage, warmth at the injection site   -Progressive weakness or numbness in your legs or arms   -Loss of bowel or bladder function   -Unusual headache that is not relieved by Tylenol   -Unusual new onset of pain that is not improving

## 2025-05-19 ENCOUNTER — OFFICE VISIT (OUTPATIENT)
Dept: ORTHOPEDICS | Facility: CLINIC | Age: 50
End: 2025-05-19
Payer: COMMERCIAL

## 2025-05-19 DIAGNOSIS — G89.29 CHRONIC LEFT SHOULDER PAIN: ICD-10-CM

## 2025-05-19 DIAGNOSIS — M25.512 CHRONIC LEFT SHOULDER PAIN: ICD-10-CM

## 2025-05-19 DIAGNOSIS — M17.12 ARTHRITIS OF LEFT KNEE: Primary | ICD-10-CM

## 2025-05-19 DIAGNOSIS — M17.12 OSTEOARTHRITIS OF LEFT PATELLOFEMORAL JOINT: ICD-10-CM

## 2025-05-19 PROCEDURE — 99207 PR DROP WITH A PROCEDURE: CPT | Performed by: STUDENT IN AN ORGANIZED HEALTH CARE EDUCATION/TRAINING PROGRAM

## 2025-05-19 PROCEDURE — 20611 DRAIN/INJ JOINT/BURSA W/US: CPT | Mod: LT | Performed by: STUDENT IN AN ORGANIZED HEALTH CARE EDUCATION/TRAINING PROGRAM

## 2025-05-19 NOTE — LETTER
5/19/2025      RE: Romario Lizarraga  [address]  [facility] [place] [address]     Dear Colleague,    Thank you for referring your patient, Romario Lizarraga, to the [facility] [place]. Please see a copy of my visit note below.    [facility]  Sports Medicine Clinic  Clinics and Surgery Center           SUBJECTIVE       Romario Lizarraga is a 49 year old male presenting to clinic today.  Overall the patient is doing well.  His last steroid injection lasted roughly 2 months.  He is having a return of pain in the left knee without evidence of instability or mechanical symptoms.  No overlying warmth or redness.    2/6/25: Osteoarthritis of left patellofemoral joint     Chronic elbow pain, left     Other orders  -     Large Joint Injection: L knee joint        Romario is a 49-year-old male returning to clinic today for the left elbow, as well as left knee.  Overall the patient's elbow is doing very well, and he is not having any remaining pain.  However his left knee is having more pain after he tripped, did not fall on the knee, but did elongate his leg, which is led to more pain in the knee.  His physical exam is rather reassuring and he does not have any ligamentous instability or mechanical symptoms.  His pain pattern today for the knee does appear to be secondary to the osteoarthrosis, he is interested in an injection.  See below procedure note for that.  In regards to the left elbow, I would like him to continue on the conservative management that we have set forth.  He can follow-up in 3 months or more for the left knee, if knee injection is needed.  Earlier as needed as well.  All questions answered return precautions advised.    PMH, Medications and Allergies were reviewed and updated as needed.    ROS:  As noted above otherwise negative.    Patient Active Problem List   Diagnosis     CARDIOVASCULAR SCREENING; LDL GOAL LESS THAN 160     Seborrheic  dermatitis     Mild persistent asthma without complication     Chronic low back pain, unspecified back pain laterality, unspecified whether sciatica present     Chronic pain in testicle     Decreased libido     Trigger finger, right middle finger       Current Outpatient Medications   Medication Sig Dispense Refill     albuterol (PROAIR HFA/PROVENTIL HFA/VENTOLIN HFA) 108 (90 Base) MCG/ACT inhaler Inhale 2 puffs into the lungs every 4 hours as needed for shortness of breath / dyspnea 3 Inhaler 3     finasteride (PROPECIA) 1 MG tablet Take 1 tablet (1 mg) by mouth daily. 90 tablet 3     fluticasone-salmeterol (ADVAIR) 250-50 MCG/ACT inhaler Inhale 1 puff into the lungs every 12 hours. 3 each 3     gabapentin (NEURONTIN) 300 MG capsule TAKE ONE CAPSULE BY MOUTH THREE TIMES DAILY AS NEEDED 270 capsule 3     ibuprofen (ADVIL/MOTRIN) 200 MG tablet Take 800 mg by mouth every 4 hours as needed for mild pain.       minoxidil (LONITEN) 2.5 MG tablet Take 1 tablet (2.5 mg) by mouth daily. 90 tablet 3     sildenafil (VIAGRA) 50 MG tablet TAKE ONE TO TWO TABLETS BY MOUTH DAILY AS NEEDED  FOR ED 60 tablet 0     tiZANidine (ZANAFLEX) 2 MG tablet Take 1-2 tablets (2-4 mg) by mouth 3 times daily as needed for muscle spasms. 30 tablet 3            OBJECTIVE:       Vitals: There were no vitals filed for this visit.  BMI: There is no height or weight on file to calculate BMI.    Gen:  Well nourished and in no acute distress  HEENT: Extraocular movement intact  Neck: Supple  Pulm:  Breathing Comfortably. No increased respiratory effort.  Psych: Euthymic. Appropriately answers questions    MSK: Left knee without evidence of erythema or ecchymosis.  No edema.  No discernible tenderness to palpation about the joint lines, posterior patella tenderness however.  Full range of motion.  Negative Lachman and posterior drawer, negative varus valgus stress testing.  Negative Luis.  Positive patellar compression testing.,  Negative  apprehension.      Study Result    Narrative & Impression   MR left knee without contrast 7/8/2024 8:41 AM     Techniques: Multiplanar multisequence imaging of the left knee was  obtained without administration of intra-articular or intravenous  contrast using routing protocol.     History: Meniscal disruption; Internal derangement of left knee     Additional History from EMR: Injury planting leg well kicking a soccer  ball in mid May. Positive medial Luis and Apley grind on exam.     Comparison: 6/27/2016 knee MRI     Findings:     MENISCI:  Medial meniscus: Intact.  Lateral meniscus: Intact.     LIGAMENTS  Cruciate ligaments: Mildly increased intrasubstance signal in the  distal ACL.  Medial supporting structures: Intact.  Lateral supporting structures: Intact.     EXTENSOR MECHANISM  Intact. Edema deep and superficial to the distal patellar tendon.  Superolateral Hoffa's fat pad edema.     FLUID  Trace joint effusion.      OSSEOUS and ARTICULAR STRUCTURES  Bones: Unfused apophysis with bony fragmentation at the tibial  tubercle. Edema within the apophysis.     Patellofemoral compartment: Full-thickness cartilage ulcerations with  associated bone marrow edema at the lateral patellar facet and the  lateral aspect of the trochlea. Focal fissuring at the median ridge  facet with focal bone marrow hyperintensity.     Medial compartment: Multifocal superficial fissuring.     Lateral compartment: Multifocal superficial fissuring.     ANCILLARY FINDINGS  Varicosities along the anterior medial subcutaneous tissues. Reactive  edema in the fatty tissues in the intracondylar notch.                                                                      Impression:  1. Grade 4 chondromalacia of the patellofemoral joint, progressed  compared to 6/27/2016.  2. Superior-lateral Hoffa's fat pad edema and trochlear cartilage  abnormalities with edema in the trochlea, consistent with patellar  maltracking.  3. Edema deep and  superficial to the distal patellar tendon with  associated edema and the unfused apophysis consistent with  Osgood-Schlatter disease.  4. Mild increased intrasubstance signal within the ACL, likely  sequelae of prior low-grade sprain.             ASSESSMENT and PLAN:     Romario was seen today for pain.    Diagnoses and all orders for this visit:    Arthritis of left knee    Osteoarthritis of left patellofemoral joint    Chronic left shoulder pain  -     Physical Therapy  Referral; Future      Romario is a 49-year-old male returning to clinic today for a follow-up of the arthritis of the left knee.  Patient had 2 months of relief with the recent steroid injection, and is interested in viscosupplementation injections into the knee.  He has been approved for Durolane, and we will proceed with this today.  See below procedure note for full details.    As an aside, the patient did mention some chronic left shoulder pain which she did physical therapy for in 2016.  He has been doing more yard work and is beginning to feel this pain.  A full physical exam was not evaluated, but the patient has good range of motion, but a visible painful arc, likely resultant in chronic rotator cuff abnormalities.  Asked the patient if he would like to proceed with another physical therapy referral, versus a formal evaluation with an appointment, and for now the patient would like to proceed with physical therapy first.  If he is continuing to have issues, he can return to clinic for a full evaluation including imaging.    Procedure: Left Knee Injection with viscosupplementation - Ultrasound Guided  The patient was informed of the risks and the benefits of the procedure and a written consent was signed.  The patient s left knee was prepped with chlorhexidine in sterile fashion.  Durolane syringe removed from packaging and examined for package consistency.  Topical ethyl chloride was used as a pre-injection anesthetic.  Injection was  performed using sterile technique.  Under ultrasound guidance a 1.5-inch 2-gauge needle was used to enter the superolateral aspect of the knee.  Needle placement was visualized and documented with ultrasound. Ultrasound needed to ensure entranceImages were permanently stored for the patient's record.  There were no complications. The patient tolerated the procedure well. There was negligible bleeding.   The patient was instructed to ice the knee upon leaving clinic and refrain from overuse over the next 3 days.   The patient was instructed to call or go to the emergency room with any unusual pain, swelling, redness, or if otherwise concerned.  A follow up appointment will be scheduled to evaluate response to the injection, and to assess range of motion and pain.  Rafael Og DO [facility]  Primary Care Sports Medicine      Options for treatment and/or follow-up care were reviewed with the patient was actively involved in the decision making process. Patient verbalized understanding and was in agreement with the plan.      Disclaimer:  This note consists of symbols derived from keyboarding, dictation, and/or voice recognition software. As a result, although I do diligently check for accuracy, there may be errors in the script that have gone undetected. Please consider this when interpreting information found in this chart    Rafael Og DO  , Sports Medicine  Department of [facility] and [facility]      Large Joint Injection: L knee joint    Date/Time: 5/19/2025 2:11 PM    Performed by: Rafael Og DO  Authorized by: Rafael Og DO    Indications:  Pain and osteoarthritis  Needle Size comment:  23 G  Guidance: ultrasound    Approach:  Anterolateral  Location:  Knee      Medications:  60 mg sodium hyaluronate 60 MG/3ML  Outcome:  Tolerated well, no immediate complications  Procedure discussed: discussed risks, benefits, and alternatives    Consent  Given by:  Patient  Timeout: timeout called immediately prior to procedure    Prep: patient was prepped and draped in usual sterile fashion       Lexa Brady M.A., LAT, ATC  Certified Athletic Trainer            Again, thank you for allowing me to participate in the care of your patient.      Sincerely,    Rafael Og, DO

## 2025-05-19 NOTE — PROGRESS NOTES
Large Joint Injection: L knee joint    Date/Time: 5/19/2025 2:11 PM    Performed by: Rafael Og DO  Authorized by: Rafael Og DO    Indications:  Pain and osteoarthritis  Needle Size comment:  23 G  Guidance: ultrasound    Approach:  Anterolateral  Location:  Knee      Medications:  60 mg sodium hyaluronate 60 MG/3ML  Outcome:  Tolerated well, no immediate complications  Procedure discussed: discussed risks, benefits, and alternatives    Consent Given by:  Patient  Timeout: timeout called immediately prior to procedure    Prep: patient was prepped and draped in usual sterile fashion       Lexa Brady M.A., LAT, ATC  Certified Athletic Trainer

## 2025-05-19 NOTE — PROGRESS NOTES
[facility]  Sports Medicine Clinic  Clinics and Surgery Center           SUBJECTIVE       Romario Lizarraga is a 49 year old male presenting to clinic today.  Overall the patient is doing well.  His last steroid injection lasted roughly 2 months.  He is having a return of pain in the left knee without evidence of instability or mechanical symptoms.  No overlying warmth or redness.    2/6/25: Osteoarthritis of left patellofemoral joint     Chronic elbow pain, left     Other orders  -     Large Joint Injection: L knee joint        Romario is a 49-year-old male returning to clinic today for the left elbow, as well as left knee.  Overall the patient's elbow is doing very well, and he is not having any remaining pain.  However his left knee is having more pain after he tripped, did not fall on the knee, but did elongate his leg, which is led to more pain in the knee.  His physical exam is rather reassuring and he does not have any ligamentous instability or mechanical symptoms.  His pain pattern today for the knee does appear to be secondary to the osteoarthrosis, he is interested in an injection.  See below procedure note for that.  In regards to the left elbow, I would like him to continue on the conservative management that we have set forth.  He can follow-up in 3 months or more for the left knee, if knee injection is needed.  Earlier as needed as well.  All questions answered return precautions advised.    PMH, Medications and Allergies were reviewed and updated as needed.    ROS:  As noted above otherwise negative.    Patient Active Problem List   Diagnosis    CARDIOVASCULAR SCREENING; LDL GOAL LESS THAN 160    Seborrheic dermatitis    Mild persistent asthma without complication    Chronic low back pain, unspecified back pain laterality, unspecified whether sciatica present    Chronic pain in testicle    Decreased libido    Trigger finger, right middle finger       Current Outpatient Medications    Medication Sig Dispense Refill    albuterol (PROAIR HFA/PROVENTIL HFA/VENTOLIN HFA) 108 (90 Base) MCG/ACT inhaler Inhale 2 puffs into the lungs every 4 hours as needed for shortness of breath / dyspnea 3 Inhaler 3    finasteride (PROPECIA) 1 MG tablet Take 1 tablet (1 mg) by mouth daily. 90 tablet 3    fluticasone-salmeterol (ADVAIR) 250-50 MCG/ACT inhaler Inhale 1 puff into the lungs every 12 hours. 3 each 3    gabapentin (NEURONTIN) 300 MG capsule TAKE ONE CAPSULE BY MOUTH THREE TIMES DAILY AS NEEDED 270 capsule 3    ibuprofen (ADVIL/MOTRIN) 200 MG tablet Take 800 mg by mouth every 4 hours as needed for mild pain.      minoxidil (LONITEN) 2.5 MG tablet Take 1 tablet (2.5 mg) by mouth daily. 90 tablet 3    sildenafil (VIAGRA) 50 MG tablet TAKE ONE TO TWO TABLETS BY MOUTH DAILY AS NEEDED  FOR ED 60 tablet 0    tiZANidine (ZANAFLEX) 2 MG tablet Take 1-2 tablets (2-4 mg) by mouth 3 times daily as needed for muscle spasms. 30 tablet 3            OBJECTIVE:       Vitals: There were no vitals filed for this visit.  BMI: There is no height or weight on file to calculate BMI.    Gen:  Well nourished and in no acute distress  HEENT: Extraocular movement intact  Neck: Supple  Pulm:  Breathing Comfortably. No increased respiratory effort.  Psych: Euthymic. Appropriately answers questions    MSK: Left knee without evidence of erythema or ecchymosis.  No edema.  No discernible tenderness to palpation about the joint lines, posterior patella tenderness however.  Full range of motion.  Negative Lachman and posterior drawer, negative varus valgus stress testing.  Negative Luis.  Positive patellar compression testing.,  Negative apprehension.      Study Result    Narrative & Impression   MR left knee without contrast 7/8/2024 8:41 AM     Techniques: Multiplanar multisequence imaging of the left knee was  obtained without administration of intra-articular or intravenous  contrast using routing protocol.     History: Meniscal  disruption; Internal derangement of left knee     Additional History from EMR: Injury planting leg well kicking a soccer  ball in mid May. Positive medial Luis and Apley grind on exam.     Comparison: 6/27/2016 knee MRI     Findings:     MENISCI:  Medial meniscus: Intact.  Lateral meniscus: Intact.     LIGAMENTS  Cruciate ligaments: Mildly increased intrasubstance signal in the  distal ACL.  Medial supporting structures: Intact.  Lateral supporting structures: Intact.     EXTENSOR MECHANISM  Intact. Edema deep and superficial to the distal patellar tendon.  Superolateral Hoffa's fat pad edema.     FLUID  Trace joint effusion.      OSSEOUS and ARTICULAR STRUCTURES  Bones: Unfused apophysis with bony fragmentation at the tibial  tubercle. Edema within the apophysis.     Patellofemoral compartment: Full-thickness cartilage ulcerations with  associated bone marrow edema at the lateral patellar facet and the  lateral aspect of the trochlea. Focal fissuring at the median ridge  facet with focal bone marrow hyperintensity.     Medial compartment: Multifocal superficial fissuring.     Lateral compartment: Multifocal superficial fissuring.     ANCILLARY FINDINGS  Varicosities along the anterior medial subcutaneous tissues. Reactive  edema in the fatty tissues in the intracondylar notch.                                                                      Impression:  1. Grade 4 chondromalacia of the patellofemoral joint, progressed  compared to 6/27/2016.  2. Superior-lateral Hoffa's fat pad edema and trochlear cartilage  abnormalities with edema in the trochlea, consistent with patellar  maltracking.  3. Edema deep and superficial to the distal patellar tendon with  associated edema and the unfused apophysis consistent with  Osgood-Schlatter disease.  4. Mild increased intrasubstance signal within the ACL, likely  sequelae of prior low-grade sprain.             ASSESSMENT and PLAN:     Romario was seen today for  pain.    Diagnoses and all orders for this visit:    Arthritis of left knee    Osteoarthritis of left patellofemoral joint    Chronic left shoulder pain  -     Physical Therapy  Referral; Future      Romario is a 49-year-old male returning to clinic today for a follow-up of the arthritis of the left knee.  Patient had 2 months of relief with the recent steroid injection, and is interested in viscosupplementation injections into the knee.  He has been approved for Durolane, and we will proceed with this today.  See below procedure note for full details.    As an aside, the patient did mention some chronic left shoulder pain which she did physical therapy for in 2016.  He has been doing more yard work and is beginning to feel this pain.  A full physical exam was not evaluated, but the patient has good range of motion, but a visible painful arc, likely resultant in chronic rotator cuff abnormalities.  Asked the patient if he would like to proceed with another physical therapy referral, versus a formal evaluation with an appointment, and for now the patient would like to proceed with physical therapy first.  If he is continuing to have issues, he can return to clinic for a full evaluation including imaging.    Procedure: Left Knee Injection with viscosupplementation - Ultrasound Guided  The patient was informed of the risks and the benefits of the procedure and a written consent was signed.  The patient s left knee was prepped with chlorhexidine in sterile fashion.  Durolane syringe removed from packaging and examined for package consistency.  Topical ethyl chloride was used as a pre-injection anesthetic.  Injection was performed using sterile technique.  Under ultrasound guidance a 1.5-inch 2-gauge needle was used to enter the superolateral aspect of the knee.  Needle placement was visualized and documented with ultrasound. Ultrasound needed to ensure entranceImages were permanently stored for the patient's  record.  There were no complications. The patient tolerated the procedure well. There was negligible bleeding.   The patient was instructed to ice the knee upon leaving clinic and refrain from overuse over the next 3 days.   The patient was instructed to call or go to the emergency room with any unusual pain, swelling, redness, or if otherwise concerned.  A follow up appointment will be scheduled to evaluate response to the injection, and to assess range of motion and pain.  Rafael Og DO [facility]  Primary Care Sports Medicine      Options for treatment and/or follow-up care were reviewed with the patient was actively involved in the decision making process. Patient verbalized understanding and was in agreement with the plan.      Disclaimer:  This note consists of symbols derived from keyboarding, dictation, and/or voice recognition software. As a result, although I do diligently check for accuracy, there may be errors in the script that have gone undetected. Please consider this when interpreting information found in this chart    Rafael Og DO  , Sports Medicine  Department of [facility] and [facility]

## 2025-05-19 NOTE — NURSING NOTE
[address] [address]  Dept: [phone]  ______________________________________________________________________________    Patient: Romario Lizarraga   : 1975   MRN: [id number]   May 19, 2025    INVASIVE PROCEDURE SAFETY CHECKLIST    Date: 25   Procedure: Left knee IA Durolane injection  Patient Name: Romario Lizarraga  MRN: [id number]  YOB: 1975    Action: Complete sections as appropriate. Any discrepancy results in a HARD COPY until resolved.     PRE PROCEDURE:  Patient ID verified with 2 identifiers (name and  or MRN): Yes  Procedure and site verified with patient/designee (when able): Yes  Accurate consent documentation in medical record: Yes  H&P (or appropriate assessment) documented in medical record: Yes  H&P must be up to 20 days prior to procedure and updates within 24 hours of procedure as applicable: NA  Relevant diagnostic and radiology test results appropriately labeled and displayed as applicable: Yes  Procedure site(s) marked with provider initials: NA    TIMEOUT:  Time-Out performed immediately prior to starting procedure, including verbal and active participation of all team members addressing the following:Yes  * Correct patient identify  * Confirmed that the correct side and site are marked  * An accurate procedure consent form  * Agreement on the procedure to be done  * Correct patient position  * Relevant images and results are properly labeled and appropriately displayed  * The need to administer antibiotics or fluids for irrigation purposes during the procedure as applicable   * Safety precautions based on patient history or medication use    DURING PROCEDURE: Verification of correct person, site, and procedures any time the responsibility for care of the patient is transferred to another member of the care team.       Prior to injection, verified patient identity using patient's name and date  of birth.  Due to injection administration, patient instructed to remain in clinic for 15 minutes  afterwards, and to report any adverse reaction to me immediately.    Joint injection was performed.      Drug Amount Wasted:  None.  Vial/Syringe: Syringe  Expiration Date:  8/31/27      Lexa Brady, [facility]  May 19, 2025

## 2025-05-22 ENCOUNTER — THERAPY VISIT (OUTPATIENT)
Dept: PHYSICAL THERAPY | Facility: CLINIC | Age: 50
End: 2025-05-22
Attending: STUDENT IN AN ORGANIZED HEALTH CARE EDUCATION/TRAINING PROGRAM
Payer: COMMERCIAL

## 2025-05-22 DIAGNOSIS — G89.29 CHRONIC LEFT SHOULDER PAIN: ICD-10-CM

## 2025-05-22 DIAGNOSIS — M25.512 CHRONIC LEFT SHOULDER PAIN: ICD-10-CM

## 2025-05-22 ASSESSMENT — ACTIVITIES OF DAILY LIVING (ADL)
PUTTING_ON_YOUR_PANTS: 9
PUSHING_WITH_THE_INVOLVED_ARM: 9
TOUCHING_THE_BACK_OF_YOUR_NECK: 9
PLEASE_INDICATE_YOR_PRIMARY_REASON_FOR_REFERRAL_TO_THERAPY:: SHOULDER
WASHING_YOUR_HAIR?: 9
WHEN_LYING_ON_THE_INVOLVED_SIDE: 8
REMOVING_SOMETHING_FROM_YOUR_BACK_POCKET: 7
PLACING_AN_OBJECT_ON_A_HIGH_SHELF: 9
REACHING_FOR_SOMETHING_ON_A_HIGH_SHELF: 10
CARRYING_A_HEAVY_OBJECT_OF_10_POUNDS: 10
AT_ITS_WORST?: 9
PUTTING_ON_A_SHIRT_THAT_BUTTONS_DOWN_THE_FRONT: 8
PUTTING_ON_AN_UNDERSHIRT_OR_A_PULLOVER_SWEATER: 9
WASHING_YOUR_BACK: 10

## 2025-05-22 NOTE — PROGRESS NOTES
PHYSICAL THERAPY EVALUATION  Type of Visit: Evaluation        Fall Risk Screen:  Have you fallen 2 or more times in the past year?: No  Have you fallen and had an injury in the past year?: No    Subjective   Romaroi comes into physical therapy with an onset of left shoulder pain. This pain began 2-3 weeks ago. He reports he noticed his motion was limited and he felt a zinging sensation when he was reaching for the seatbelt. The pain is isolated to superior and lateral side of his shoulder and he reports this as achy and dull pain when resting, but sharp pain with reaching. The pain bothers them the most when he is reach . They denies numbness and tingling, but reports he did have pain going down to fingertips. He is looking to get back to reaching overhead and across body without increased pain. Of note, he denies resting pain now. Of note, he has had an onset of left shoulder pain for which he had received PT before back in 2016. See below for additional pain details.        Presenting condition or subjective complaint: Shoulder pain.  Date of onset: 05/19/25 (Date of referral)    Relevant medical history:     Dates & types of surgery:      Prior diagnostic imaging/testing results:       Prior therapy history for the same diagnosis, illness or injury: Yes 2016. Pt    Prior Level of Function  Transfers:   Ambulation:   ADL:   IADL:     Living Environment  Social support: With a significant other or spouse   Type of home: House   Stairs to enter the home: No       Ramp: No   Stairs inside the home: Yes 20 Is there a railing: Yes     Help at home: Self Cares (home health aide/personal care attendant, family, etc)  Equipment owned: Straight Cane; Crutches; Bath bench     Employment: Yes Sub teacher  Hobbies/Interests: Golf. Fishing.    Patient goals for therapy: Have normal movement without pain    Pain assessment: See objective evaluation for additional pain details     Objective   SHOULDER EVALUATION  PAIN: Pain Level  at Rest: 0/10  Pain Level with Use: 9/10  Pain Location: shoulder  Pain Quality: Aching, Dull, and Sharp  Pain Frequency: intermittent  Pain is Worst: no pattern  Pain is Exacerbated By: cross body adduction, overhead reaching  Pain is Relieved By: rest and muscle relaxers, topicals  Pain Progression: Improved  INTEGUMENTARY (edema, incisions):   POSTURE: WNL  GAIT:   Weightbearing Status:   Assistive Device(s):   Gait Deviations:   BALANCE/PROPRIOCEPTION:   WEIGHTBEARING ALIGNMENT:   ROM:   (Degrees) Left AROM Left PROM Right AROM  Right PROM   Shoulder Flexion 130*  160 180   Shoulder Extension WNL  WNL    Shoulder Abduction 90*  180 w/ clicking    Shoulder Internal Rotation T6  T10 (stiffer than normal)    Shoulder External Rotation 90  70    Elbow Extension WNL  WNL    Elbow Flexion WNL  WNL    Pain:   End feel:     STRENGTH:   Pain: - none + mild ++ moderate +++ severe  Strength Scale: 0-5/5 Left Right   Shoulder Flexion 5- 5   Shoulder Abduction 3+, ++ (mod) 5   Shoulder Internal Rotation 4-, + (mild) 5   Shoulder External Rotation 5- 5   Elbow Flexion 5 5   Elbow Extension 5, + (mild) 5     FLEXIBILITY:   SPECIAL TESTS:   PALPATION:   + Tenderness At Location Left Right   Clavicle -    Acromioclavicular +    Supraspinatus -    Infraspinatus -    Subscapularis -    Pec Major +    Levator -    Rhomboids -    Upper trap -      JOINT MOBILITY:   CERVICAL SCREEN: WNL    Assessment & Plan   CLINICAL IMPRESSIONS  Medical Diagnosis: Left Shoulder Pain    Treatment Diagnosis: Shoulder pain with mobility deficits   Impression/Assessment: Patient is a 49 year old male with left shoulder pain complaints.  The following significant findings have been identified: Pain, Decreased ROM/flexibility, Decreased joint mobility, Decreased strength, Impaired muscle performance, and Decreased activity tolerance. These impairments interfere with their ability to perform self care tasks, recreational activities, and household chores  as compared to previous level of function.     Clinical Decision Making (Complexity):  Clinical Presentation: Stable/Uncomplicated  Clinical Presentation Rationale: based on medical and personal factors listed in PT evaluation  Clinical Decision Making (Complexity): Low complexity    PLAN OF CARE  Treatment Interventions:  Modalities: Dry Needling, E-stim  Interventions: Manual Therapy, Neuromuscular Re-education, Therapeutic Activity, Therapeutic Exercise, Self-Care/Home Management    Long Term Goals     PT Goal 1  Goal Identifier: LTG 1  Goal Description: Patient will be able to reach overhead without an increase in pain in both flexion and abduction  Rationale: to maximize safety and independence with performance of ADLs and functional tasks;to maximize safety and independence within the home;to maximize safety and independence within the community;to maximize safety and independence with self cares  Target Date: 07/31/25      Frequency of Treatment: 1x week  Duration of Treatment: 2 weeks, then 2x month for 2 months    Recommended Referrals to Other Professionals:   Education Assessment:   Learner/Method: Patient;No Barriers to Learning    Risks and benefits of evaluation/treatment have been explained.   Patient/Family/caregiver agrees with Plan of Care.     Evaluation Time:     PT Eval, Low Complexity Minutes (61416): 25       Signing Clinician: Jocelyn Jordan, BASIL

## 2025-06-16 ENCOUNTER — THERAPY VISIT (OUTPATIENT)
Dept: PHYSICAL THERAPY | Facility: CLINIC | Age: 50
End: 2025-06-16
Payer: COMMERCIAL

## 2025-06-16 DIAGNOSIS — G89.29 CHRONIC LEFT SHOULDER PAIN: Primary | ICD-10-CM

## 2025-06-16 DIAGNOSIS — M25.512 CHRONIC LEFT SHOULDER PAIN: Primary | ICD-10-CM

## 2025-06-16 PROCEDURE — 97110 THERAPEUTIC EXERCISES: CPT | Mod: GP

## 2025-07-01 ENCOUNTER — THERAPY VISIT (OUTPATIENT)
Dept: PHYSICAL THERAPY | Facility: CLINIC | Age: 50
End: 2025-07-01
Payer: COMMERCIAL

## 2025-07-01 DIAGNOSIS — M25.512 CHRONIC LEFT SHOULDER PAIN: Primary | ICD-10-CM

## 2025-07-01 DIAGNOSIS — G89.29 CHRONIC LEFT SHOULDER PAIN: Primary | ICD-10-CM

## 2025-07-01 PROCEDURE — 97530 THERAPEUTIC ACTIVITIES: CPT | Mod: GP

## 2025-07-01 PROCEDURE — 97110 THERAPEUTIC EXERCISES: CPT | Mod: GP

## 2025-07-05 PROBLEM — G89.29 CHRONIC LEFT SHOULDER PAIN: Status: ACTIVE | Noted: 2025-07-05

## 2025-07-05 PROBLEM — M25.512 CHRONIC LEFT SHOULDER PAIN: Status: ACTIVE | Noted: 2025-07-05

## 2025-07-15 ENCOUNTER — THERAPY VISIT (OUTPATIENT)
Dept: PHYSICAL THERAPY | Facility: CLINIC | Age: 50
End: 2025-07-15
Payer: COMMERCIAL

## 2025-07-15 DIAGNOSIS — M25.512 CHRONIC LEFT SHOULDER PAIN: Primary | ICD-10-CM

## 2025-07-15 DIAGNOSIS — G89.29 CHRONIC LEFT SHOULDER PAIN: Primary | ICD-10-CM

## 2025-07-15 PROCEDURE — 97110 THERAPEUTIC EXERCISES: CPT | Mod: GP

## 2025-07-16 ENCOUNTER — MYC MEDICAL ADVICE (OUTPATIENT)
Dept: PEDIATRICS | Facility: CLINIC | Age: 50
End: 2025-07-16
Payer: COMMERCIAL

## 2025-07-16 DIAGNOSIS — M54.50 CHRONIC LOW BACK PAIN, UNSPECIFIED BACK PAIN LATERALITY, UNSPECIFIED WHETHER SCIATICA PRESENT: Primary | ICD-10-CM

## 2025-07-16 DIAGNOSIS — G89.29 CHRONIC LOW BACK PAIN, UNSPECIFIED BACK PAIN LATERALITY, UNSPECIFIED WHETHER SCIATICA PRESENT: Primary | ICD-10-CM

## 2025-07-17 ENCOUNTER — PATIENT OUTREACH (OUTPATIENT)
Dept: CARE COORDINATION | Facility: CLINIC | Age: 50
End: 2025-07-17
Payer: COMMERCIAL

## 2025-07-17 NOTE — TELEPHONE ENCOUNTER
DIAGNOSIS: Chronic low back pain, unspecified back pain laterality, unspecified whether sciatica present   APPOINTMENT DATE: 7/29/25    NOTES STATUS DETAILS   OFFICE NOTE from referring provider Internal 7/16/25 (MyChart Note), 4/21/25m 11/12/24 Daniel Bauer MD @[facility]    See Additional Encounters   OFFICE NOTE from other specialist Internal 1/30/25 Trudy Palacios MD @[facility]     INJECTIONS DONE IN RADIOLOGY Internal [facility]  3/31/25Bilateral L3,4,5 medial branch block #2   2/19/25 Bilateral L3,4,5 medial branch block #1      MRI Internal [facility]  2/17/22 MR Lumbar Spine

## 2025-07-23 DIAGNOSIS — G89.29 CHRONIC MIDLINE LOW BACK PAIN WITH LEFT-SIDED SCIATICA: ICD-10-CM

## 2025-07-23 DIAGNOSIS — M54.42 CHRONIC MIDLINE LOW BACK PAIN WITH LEFT-SIDED SCIATICA: ICD-10-CM

## 2025-07-23 RX ORDER — TIZANIDINE 2 MG/1
TABLET ORAL
Qty: 30 TABLET | Refills: 0 | Status: SHIPPED | OUTPATIENT
Start: 2025-07-23

## 2025-07-28 DIAGNOSIS — M54.50 LOW BACK PAIN: Primary | ICD-10-CM

## 2025-07-29 ENCOUNTER — ANCILLARY PROCEDURE (OUTPATIENT)
Dept: GENERAL RADIOLOGY | Facility: CLINIC | Age: 50
End: 2025-07-29
Attending: ORTHOPAEDIC SURGERY
Payer: COMMERCIAL

## 2025-07-29 ENCOUNTER — PRE VISIT (OUTPATIENT)
Dept: ORTHOPEDICS | Facility: CLINIC | Age: 50
End: 2025-07-29

## 2025-07-29 ENCOUNTER — OFFICE VISIT (OUTPATIENT)
Dept: ORTHOPEDICS | Facility: CLINIC | Age: 50
End: 2025-07-29
Payer: COMMERCIAL

## 2025-07-29 ENCOUNTER — THERAPY VISIT (OUTPATIENT)
Dept: PHYSICAL THERAPY | Facility: CLINIC | Age: 50
End: 2025-07-29
Payer: COMMERCIAL

## 2025-07-29 VITALS — HEIGHT: 72 IN | WEIGHT: 220 LBS | BODY MASS INDEX: 29.8 KG/M2

## 2025-07-29 DIAGNOSIS — M25.512 CHRONIC LEFT SHOULDER PAIN: Primary | ICD-10-CM

## 2025-07-29 DIAGNOSIS — M54.50 LOW BACK PAIN: ICD-10-CM

## 2025-07-29 DIAGNOSIS — M54.42 CHRONIC MIDLINE LOW BACK PAIN WITH BILATERAL SCIATICA: Primary | ICD-10-CM

## 2025-07-29 DIAGNOSIS — M54.41 CHRONIC MIDLINE LOW BACK PAIN WITH BILATERAL SCIATICA: Primary | ICD-10-CM

## 2025-07-29 DIAGNOSIS — M53.3 SACROILIAC JOINT PAIN: ICD-10-CM

## 2025-07-29 DIAGNOSIS — G89.29 CHRONIC MIDLINE LOW BACK PAIN WITH BILATERAL SCIATICA: Primary | ICD-10-CM

## 2025-07-29 DIAGNOSIS — G89.29 CHRONIC LEFT SHOULDER PAIN: Primary | ICD-10-CM

## 2025-07-29 PROCEDURE — 97110 THERAPEUTIC EXERCISES: CPT | Mod: GP

## 2025-07-29 PROCEDURE — 72110 X-RAY EXAM L-2 SPINE 4/>VWS: CPT | Performed by: STUDENT IN AN ORGANIZED HEALTH CARE EDUCATION/TRAINING PROGRAM

## 2025-07-29 PROCEDURE — 99214 OFFICE O/P EST MOD 30 MIN: CPT | Mod: GC | Performed by: ORTHOPAEDIC SURGERY

## 2025-07-29 NOTE — NURSING NOTE
Reason For Visit:   Chief Complaint   Patient presents with    Consult     Low back pain        Primary MD: Daniel Bauer  Ref. MD: Daniel Bauer    ?  No  Occupation .  Currently working? Yes.  Work status?  Full time.  Date of injury: about 9 years ago, then reinjured 1.5 years ago  Type of injury: pulling weeds, lifting kids  Date of surgery: N/A  Type of surgery: N/A.  Smoker: No  Request smoking cessation information: No    Ht 1.829 m (6')   Wt 99.8 kg (220 lb)   BMI 29.84 kg/m           Oswestry (GABINO) Questionnaire        7/28/2025    10:54 PM   OSWESTRY DISABILITY INDEX   Count 10    Sum 19    Oswestry Score (%) 38 %        Patient-reported            Neck Disability Index (NDI) Questionnaire         No data to display                            Promis 10 Assessment         No data to display                         Deedee Rodriguez, ATC
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
## 2025-08-06 ENCOUNTER — TELEPHONE (OUTPATIENT)
Dept: ORTHOPEDICS | Facility: CLINIC | Age: 50
End: 2025-08-06
Payer: COMMERCIAL

## 2025-08-08 ENCOUNTER — HOSPITAL ENCOUNTER (OUTPATIENT)
Facility: CLINIC | Age: 50
Discharge: HOME | End: 2025-08-08
Attending: ORTHOPAEDIC SURGERY | Admitting: ORTHOPAEDIC SURGERY
Payer: COMMERCIAL

## 2025-08-08 ASSESSMENT — ACTIVITIES OF DAILY LIVING (ADL)
ADLS_ACUITY_SCORE: 41

## 2025-08-09 ASSESSMENT — ACTIVITIES OF DAILY LIVING (ADL)
ADLS_ACUITY_SCORE: 41

## 2025-08-10 ASSESSMENT — ACTIVITIES OF DAILY LIVING (ADL)
ADLS_ACUITY_SCORE: 41

## 2025-08-11 ASSESSMENT — ACTIVITIES OF DAILY LIVING (ADL)
ADLS_ACUITY_SCORE: 41

## 2025-08-12 ASSESSMENT — ACTIVITIES OF DAILY LIVING (ADL)
ADLS_ACUITY_SCORE: 41

## 2025-08-18 ENCOUNTER — THERAPY VISIT (OUTPATIENT)
Dept: PHYSICAL THERAPY | Facility: CLINIC | Age: 50
End: 2025-08-18
Payer: COMMERCIAL

## 2025-08-18 DIAGNOSIS — G89.29 CHRONIC LEFT SHOULDER PAIN: Primary | ICD-10-CM

## 2025-08-18 DIAGNOSIS — M25.512 CHRONIC LEFT SHOULDER PAIN: Primary | ICD-10-CM

## 2025-08-18 PROCEDURE — 97110 THERAPEUTIC EXERCISES: CPT | Mod: GP

## 2025-08-18 PROCEDURE — 97530 THERAPEUTIC ACTIVITIES: CPT | Mod: GP

## 2025-08-20 ENCOUNTER — HOSPITAL ENCOUNTER (OUTPATIENT)
Dept: MRI IMAGING | Facility: CLINIC | Age: 50
Discharge: HOME | End: 2025-08-20
Attending: ORTHOPAEDIC SURGERY
Payer: COMMERCIAL

## 2025-08-20 DIAGNOSIS — G89.29 CHRONIC MIDLINE LOW BACK PAIN WITH BILATERAL SCIATICA: ICD-10-CM

## 2025-08-20 DIAGNOSIS — M53.3 SACROILIAC JOINT PAIN: ICD-10-CM

## 2025-08-20 DIAGNOSIS — M54.41 CHRONIC MIDLINE LOW BACK PAIN WITH BILATERAL SCIATICA: ICD-10-CM

## 2025-08-20 DIAGNOSIS — M54.42 CHRONIC MIDLINE LOW BACK PAIN WITH BILATERAL SCIATICA: ICD-10-CM

## 2025-08-20 PROCEDURE — 72148 MRI LUMBAR SPINE W/O DYE: CPT

## 2025-08-25 ENCOUNTER — VIRTUAL VISIT (OUTPATIENT)
Dept: ORTHOPEDICS | Facility: CLINIC | Age: 50
End: 2025-08-25
Attending: ORTHOPAEDIC SURGERY
Payer: COMMERCIAL

## 2025-08-25 DIAGNOSIS — M54.16 LUMBAR RADICULOPATHY: Primary | ICD-10-CM

## 2025-08-25 PROCEDURE — 98013 SYNCH AUDIO-ONLY EST LOW 20: CPT | Performed by: ORTHOPAEDIC SURGERY

## 2025-09-03 ENCOUNTER — THERAPY VISIT (OUTPATIENT)
Dept: PHYSICAL THERAPY | Facility: CLINIC | Age: 50
End: 2025-09-03
Attending: ORTHOPAEDIC SURGERY
Payer: COMMERCIAL

## 2025-09-03 DIAGNOSIS — M54.42 CHRONIC MIDLINE LOW BACK PAIN WITH BILATERAL SCIATICA: ICD-10-CM

## 2025-09-03 DIAGNOSIS — G89.29 CHRONIC MIDLINE LOW BACK PAIN WITH BILATERAL SCIATICA: ICD-10-CM

## 2025-09-03 DIAGNOSIS — M54.41 CHRONIC MIDLINE LOW BACK PAIN WITH BILATERAL SCIATICA: ICD-10-CM

## 2025-09-03 DIAGNOSIS — M53.3 SACROILIAC JOINT PAIN: ICD-10-CM

## 2025-09-03 PROCEDURE — 97161 PT EVAL LOW COMPLEX 20 MIN: CPT | Mod: GP | Performed by: PHYSICAL THERAPIST

## 2025-09-03 PROCEDURE — 97110 THERAPEUTIC EXERCISES: CPT | Mod: GP | Performed by: PHYSICAL THERAPIST

## (undated) DEVICE — PREP CHLORAPREP 26ML TINTED HI-LITE ORANGE 930815

## (undated) DEVICE — BNDG ELASTIC 2"X5YDS UNSTERILE 6611-20

## (undated) DEVICE — BNDG KLING 2" 2231

## (undated) DEVICE — SOL NACL 0.9% IRRIG 500ML BOTTLE 2F7123

## (undated) DEVICE — DRSG STERI STRIP 1X5" R1548

## (undated) DEVICE — GLOVE BIOGEL PI MICRO SZ 7.0 48570

## (undated) DEVICE — SYR EAR BULB 3OZ 0035830

## (undated) DEVICE — SU MONOCRYL 4-0 PS-2 27" UND Y426H

## (undated) DEVICE — DRSG GAUZE 4X4" 3033

## (undated) DEVICE — PACK MINOR HAND CUSTOM ASC 37-0097A

## (undated) DEVICE — LINEN ORTHO PACK 5446

## (undated) RX ORDER — TRIAMCINOLONE ACETONIDE 40 MG/ML
INJECTION, SUSPENSION INTRA-ARTICULAR; INTRAMUSCULAR
Status: DISPENSED
Start: 2025-02-06

## (undated) RX ORDER — LIDOCAINE HYDROCHLORIDE AND EPINEPHRINE 10; 10 MG/ML; UG/ML
INJECTION, SOLUTION INFILTRATION; PERINEURAL
Status: DISPENSED
Start: 2024-10-24

## (undated) RX ORDER — TRIAMCINOLONE ACETONIDE 40 MG/ML
INJECTION, SUSPENSION INTRA-ARTICULAR; INTRAMUSCULAR
Status: DISPENSED
Start: 2024-08-09

## (undated) RX ORDER — LIDOCAINE HYDROCHLORIDE 10 MG/ML
INJECTION, SOLUTION EPIDURAL; INFILTRATION; INTRACAUDAL; PERINEURAL
Status: DISPENSED
Start: 2024-08-09

## (undated) RX ORDER — LIDOCAINE HYDROCHLORIDE 10 MG/ML
INJECTION, SOLUTION EPIDURAL; INFILTRATION; INTRACAUDAL; PERINEURAL
Status: DISPENSED
Start: 2025-02-06